# Patient Record
Sex: FEMALE | Race: WHITE | NOT HISPANIC OR LATINO | URBAN - METROPOLITAN AREA
[De-identification: names, ages, dates, MRNs, and addresses within clinical notes are randomized per-mention and may not be internally consistent; named-entity substitution may affect disease eponyms.]

---

## 2020-09-22 ENCOUNTER — EMERGENCY (EMERGENCY)
Facility: HOSPITAL | Age: 40
LOS: 0 days | Discharge: HOME | End: 2020-09-22
Attending: EMERGENCY MEDICINE | Admitting: EMERGENCY MEDICINE
Payer: MEDICAID

## 2020-09-22 VITALS
SYSTOLIC BLOOD PRESSURE: 112 MMHG | OXYGEN SATURATION: 98 % | TEMPERATURE: 99 F | HEART RATE: 84 BPM | RESPIRATION RATE: 18 BRPM | DIASTOLIC BLOOD PRESSURE: 70 MMHG

## 2020-09-22 DIAGNOSIS — M79.606 PAIN IN LEG, UNSPECIFIED: ICD-10-CM

## 2020-09-22 DIAGNOSIS — I83.812 VARICOSE VEINS OF LEFT LOWER EXTREMITY WITH PAIN: ICD-10-CM

## 2020-09-22 PROCEDURE — 99283 EMERGENCY DEPT VISIT LOW MDM: CPT

## 2020-09-22 NOTE — ED PROVIDER NOTE - PROVIDER TOKENS
PROVIDER:[TOKEN:[[id number]],FOLLOWUP:[1-3 Days]],PROVIDER:[TOKEN:[[id number]],FOLLOWUP:[1-3 Days]]

## 2020-09-22 NOTE — ED PROVIDER NOTE - OBJECTIVE STATEMENT
41 yo female, pmh of varicose veins, presents to ed for bleed from right lower leg varicose vein, happened after shower today, mild, used compression and wrap, no pain or radiation. denies numbness, tingling, trauma, limited rom.

## 2020-09-22 NOTE — ED PROVIDER NOTE - CARE PROVIDERS DIRECT ADDRESSES
[email or web address]

## 2020-09-22 NOTE — ED PROVIDER NOTE - ATTENDING CONTRIBUTION TO CARE
40F PMH methadone, smoker, obese, hypothyroid, varicose veins, p/w bleeding RLE varicose vein after taking a shower. denies shaving or scratching it. came to ED after she couldn't control bleeding. no cp, sob, palp, loc, dizziness. no numbness, weakness. no le pain. no fever, cough. bleeding stopped upon arrival to ED    on exam, AFVSS, well zeeshan nad, ncat, eomi, perrla, mmm, aaox3, no focal deficits, no le edema or calf ttp, R calf varicose vein  bleeding stopped,  no sign of infection, nontender,     a/p; Bleeding varicose vein, stopped on its own in ED, wound cleaned and dressing applied, f/u pmd/ vascular 1-2 weeks, strict return precautions provided.

## 2020-09-22 NOTE — ED PROVIDER NOTE - PATIENT PORTAL LINK FT
You can access the FollowMyHealth Patient Portal offered by [facility] by registering at the following website: [email or web address]. By joining [employer]’s FollowMyHealth portal, you will also be able to view your health information using other applications (apps) compatible with our system.

## 2020-09-22 NOTE — ED PROVIDER NOTE - CLINICAL SUMMARY MEDICAL DECISION MAKING FREE TEXT BOX
a/p; Bleeding varicose vein, stopped on its own in ED, wound cleaned and dressing applied, f/u pmd/ vascular 1-2 weeks, strict return precautions provided.

## 2020-09-22 NOTE — ED PROVIDER NOTE - CARE PROVIDER_API CALL
Sumanth Silverman  PLASTIC SURGERY  [address] [place] [address]  Phone: [phone]  Fax: [phone]  Follow Up Time: 1-3 Days    Alf Burciaga  Vascular Surgery  [address] [place] [id number]  Phone: [phone]  Fax: [phone]  Follow Up Time: 1-3 Days

## 2020-09-22 NOTE — ED PROVIDER NOTE - NS ED ROS FT
Constitutional: (-) weakness  Musculoskeletal: (-) joint pain,  Integumentary: (-) bleeding, (+) varicose vein   Neurological: (-) tingling, (-)numbness,  Allergic/Immunologic: (-) pruritus

## 2020-09-22 NOTE — ED PROVIDER NOTE - PHYSICAL EXAMINATION
Physical Exam    Vital Signs: I have reviewed the initial vital signs.  Constitutional: well-nourished, appears stated age, no acute distress  Eyes: Conjunctiva pink, Sclera clear,  Cardiovascular: S1 and S2, regular rate, regular rhythm, well-perfused extremities, radial pulses equal and 2+, pedal pulses 2+ and equal   Integumentary: varicose vein non bleeding to left distal tibia  Neurologic: awake, alert, nvi

## 2020-09-23 ENCOUNTER — EMERGENCY (EMERGENCY)
Facility: HOSPITAL | Age: 40
LOS: 0 days | Discharge: HOME | End: 2020-09-23
Attending: EMERGENCY MEDICINE | Admitting: EMERGENCY MEDICINE
Payer: MEDICAID

## 2020-09-23 VITALS
RESPIRATION RATE: 20 BRPM | SYSTOLIC BLOOD PRESSURE: 122 MMHG | OXYGEN SATURATION: 95 % | TEMPERATURE: 99 F | DIASTOLIC BLOOD PRESSURE: 66 MMHG | HEART RATE: 92 BPM | WEIGHT: 220.02 LBS

## 2020-09-23 DIAGNOSIS — F17.200 NICOTINE DEPENDENCE, UNSPECIFIED, UNCOMPLICATED: ICD-10-CM

## 2020-09-23 DIAGNOSIS — I83.891 VARICOSE VEINS OF RIGHT LOWER EXTREMITY WITH OTHER COMPLICATIONS: ICD-10-CM

## 2020-09-23 DIAGNOSIS — E03.9 HYPOTHYROIDISM, UNSPECIFIED: ICD-10-CM

## 2020-09-23 PROCEDURE — 99284 EMERGENCY DEPT VISIT MOD MDM: CPT

## 2020-09-23 NOTE — ED PROVIDER NOTE - CARE PROVIDER_API CALL
Alf Burciaga  Vascular Surgery  [address] [place] [address]  Phone: [phone]  Fax: [phone]  Follow Up Time:

## 2020-09-23 NOTE — ED PROVIDER NOTE - ATTENDING CONTRIBUTION TO CARE
41 y/o female with hx opioid dependence, on Methadone, anxiety, hypothyroidism presents to ED for bleeding varicosity to the RLE.  No leg pain, CP, SOB.  No fever or chills.  PE:  agree with above.  A/P:  Bleeding Varicosity.  Gelfoam and Reassess.

## 2020-09-23 NOTE — ED PROVIDER NOTE - OBJECTIVE STATEMENT
Pt is a 39y/o female with a pmhx of opioid abuse on methadone, anxiety, hypothyroidism presents today fore eval of bleeding varicose vein that began 1 hour PTA. Pt rachel serna yesterday for same complaint. Pt denies fever, chills, weakness, lightheadedness, n/v/d.

## 2020-09-23 NOTE — ED PROVIDER NOTE - NS ED ROS FT
ENMT:  No hearing changes, pain, discharge or infections. No neck pain or stiffness.  Cardiac:  No chest pain, SOB or edema  Respiratory:  No cough or respiratory distress. No hemoptysis. No history of asthma or RAD.  GI:  No nausea, vomiting, diarrhea or abdominal pain.  MS:  No myalgia, muscle weakness, joint pain or back pain.  Neuro:  No headache or weakness.  No LOC.  Skin:  + varicose veins  Endocrine: No history of thyroid disease or diabetes.  Except as documented in the HPI,  all other systems are negative.

## 2020-09-23 NOTE — ED PROVIDER NOTE - PATIENT PORTAL LINK FT
You can access the FollowMyHealth Patient Portal offered by [facility] by registering at the following website: [email or web address]. By joining [employer]’s FollowMyHealth portal, you will also be able to view your health information using other applications (apps) compatible with our system.

## 2020-09-23 NOTE — ED PROVIDER NOTE - PHYSICAL EXAMINATION
VITAL SIGNS: I have reviewed nursing notes and confirm.  CONSTITUTIONAL: Well-developed; well-nourished; in no acute distress.   SKIN:  varicose veins present, + scabbed over area of RLE varicose vein, remainder of skin exam is warm and dry, no acute rash.    HEAD: Normocephalic; atraumatic.  EYES: conjunctiva and sclera clear.  ENT: No nasal discharge; airway clear.  EXT: Normal ROM.  No clubbing, cyanosis or edema.   NEURO: Alert, oriented, grossly unremarkable

## 2020-09-23 NOTE — ED ADULT TRIAGE NOTE - CHIEF COMPLAINT QUOTE
pt presents to ED BIBA for varicose vein "popped." pt was seen in ED yesterday for same thing and it was clotted and was sent home. Denies any trauma to leg, happened while patient was walking. Bleeding controlled, pt pupils noted to be pinpoint.

## 2020-09-24 PROBLEM — Z00.00 ENCOUNTER FOR PREVENTIVE HEALTH EXAMINATION: Status: ACTIVE | Noted: 2020-09-24

## 2020-09-29 ENCOUNTER — APPOINTMENT (OUTPATIENT)
Dept: BURN CARE | Facility: CLINIC | Age: 40
End: 2020-09-29

## 2020-10-15 ENCOUNTER — APPOINTMENT (OUTPATIENT)
Dept: VASCULAR SURGERY | Facility: CLINIC | Age: 40
End: 2020-10-15

## 2020-10-21 ENCOUNTER — EMERGENCY (EMERGENCY)
Facility: HOSPITAL | Age: 40
LOS: 0 days | Discharge: HOME | End: 2020-10-21
Attending: EMERGENCY MEDICINE | Admitting: EMERGENCY MEDICINE
Payer: MEDICAID

## 2020-10-21 VITALS
RESPIRATION RATE: 18 BRPM | HEART RATE: 95 BPM | OXYGEN SATURATION: 97 % | DIASTOLIC BLOOD PRESSURE: 76 MMHG | SYSTOLIC BLOOD PRESSURE: 124 MMHG | TEMPERATURE: 100 F

## 2020-10-21 DIAGNOSIS — M76.61 ACHILLES TENDINITIS, RIGHT LEG: ICD-10-CM

## 2020-10-21 DIAGNOSIS — M79.671 PAIN IN RIGHT FOOT: ICD-10-CM

## 2020-10-21 DIAGNOSIS — F17.200 NICOTINE DEPENDENCE, UNSPECIFIED, UNCOMPLICATED: ICD-10-CM

## 2020-10-21 DIAGNOSIS — Z79.899 OTHER LONG TERM (CURRENT) DRUG THERAPY: ICD-10-CM

## 2020-10-21 DIAGNOSIS — E03.9 HYPOTHYROIDISM, UNSPECIFIED: ICD-10-CM

## 2020-10-21 PROCEDURE — 93970 EXTREMITY STUDY: CPT | Mod: 26

## 2020-10-21 PROCEDURE — 99284 EMERGENCY DEPT VISIT MOD MDM: CPT

## 2020-10-21 RX ORDER — KETOROLAC TROMETHAMINE 30 MG/ML
30 SYRINGE (ML) INJECTION ONCE
Refills: 0 | Status: DISCONTINUED | OUTPATIENT
Start: 2020-10-21 | End: 2020-10-21

## 2020-10-21 RX ADMIN — Medication 30 MILLIGRAM(S): at 19:56

## 2020-10-21 NOTE — ED PROVIDER NOTE - PROGRESS NOTE DETAILS
vasc prelim negative. patient educated on s/s to be aware of that should prompt return to the er. patient verbalizes understanding and will fu with pmd.

## 2020-10-21 NOTE — ED PROVIDER NOTE - PHYSICAL EXAMINATION
CONSTITUTIONAL: Well-developed; well-nourished; in no acute distress, nontoxic appearing  SKIN: skin exam is warm and dry,  HEAD: Normocephalic; atraumatic.  CARD: S1, S2 normal, no murmur  RESP: No wheezes, rales or rhonchi. Good air movement bilaterally  ABD: soft; non-distended; non-tender.   EXT: +TTP overlying achilles tendon, no deformity, FROM of extremity at hip, knee, ankle. pulses 2+. No calf tenderness/swelling  NEURO: awake, alert, following commands, oriented, grossly unremarkable. No Focal deficits. GCS 15.   PSYCH: Cooperative, appropriate.

## 2020-10-21 NOTE — ED PROVIDER NOTE - PATIENT PORTAL LINK FT
You can access the FollowMyHealth Patient Portal offered by [facility] by registering at the following website: [email or web address]. By joining [employer]’s FollowMyHealth portal, you will also be able to view your health information using other applications (apps) compatible with our system.

## 2020-10-21 NOTE — ED ADULT NURSE NOTE - ASSOCIATED SYMPTOMS
right leg swelling with pain. no trauma or deformity noted. pt. alert and oriented times four. vss. pt. went to duplex. pain meds given will monitor.

## 2020-10-21 NOTE — ED ADULT TRIAGE NOTE - CHIEF COMPLAINT QUOTE
pt c/o right leg swelling/redness x 1 week. pt with hx of cellulitis. denies any fever/chills denies any injury.

## 2020-10-21 NOTE — ED PROVIDER NOTE - NS ED ROS FT
Review of Systems:  	•	CONSTITUTIONAL: no fever, no diaphoresis, no chills  	•	SKIN: no rash  	•	HEMATOLOGIC: no bleeding, no bruising  	•	RESPIRATORY: no shortness of breath, no cough  	•	CARDIAC: no chest pain, no palpitations  	•	GI: no abd pain, no nausea, no vomiting, no diarrhea  	•	MUSCULOSKELETAL: +R ankle pain, +swelling, no redness  	•	NEUROLOGIC: no weakness, no paresthesias, no numbness

## 2020-10-21 NOTE — ED PROVIDER NOTE - OBJECTIVE STATEMENT
40 year old female, past medical history substance use disorder on methotrexate, hypothyroidism, who presents with R foot pain x5 days. 40 year old female, past medical history substance use disorder on methotrexate, hypothyroidism, who presents with R foot pain x5 days. Patient states she walked xseveral miles day after initial pain began. Denies recent fall/trauma. Patient reports pain worse with ambulation improved with rest. No weakness, numbness, paresthesias. No fever, chills, CP, SOB, skin changes.

## 2020-10-21 NOTE — ED PROVIDER NOTE - ATTENDING CONTRIBUTION TO CARE
39 y/o female with hx of substance use disorder presents to ED with right dorsal and posterior foot pain x 5 days, progressively worsening.   Exacerbated by ambulation and plantar flexion.  PE:  agree with above.  A/P:  Achilles Tendonitis.  Rest, NSAIDs and D/C home.

## 2020-10-22 ENCOUNTER — EMERGENCY (EMERGENCY)
Facility: HOSPITAL | Age: 40
LOS: 0 days | Discharge: LEFT BEFORE DISCHARGE | End: 2020-10-22
Attending: STUDENT IN AN ORGANIZED HEALTH CARE EDUCATION/TRAINING PROGRAM | Admitting: STUDENT IN AN ORGANIZED HEALTH CARE EDUCATION/TRAINING PROGRAM
Payer: MEDICAID

## 2020-10-22 VITALS
DIASTOLIC BLOOD PRESSURE: 72 MMHG | SYSTOLIC BLOOD PRESSURE: 117 MMHG | TEMPERATURE: 100 F | OXYGEN SATURATION: 95 % | HEART RATE: 89 BPM | RESPIRATION RATE: 20 BRPM

## 2020-10-22 DIAGNOSIS — E66.01 MORBID (SEVERE) OBESITY DUE TO EXCESS CALORIES: ICD-10-CM

## 2020-10-22 DIAGNOSIS — M79.669 PAIN IN UNSPECIFIED LOWER LEG: ICD-10-CM

## 2020-10-22 DIAGNOSIS — I83.891 VARICOSE VEINS OF RIGHT LOWER EXTREMITY WITH OTHER COMPLICATIONS: ICD-10-CM

## 2020-10-22 DIAGNOSIS — F17.200 NICOTINE DEPENDENCE, UNSPECIFIED, UNCOMPLICATED: ICD-10-CM

## 2020-10-22 DIAGNOSIS — E03.9 HYPOTHYROIDISM, UNSPECIFIED: ICD-10-CM

## 2020-10-22 PROCEDURE — 99283 EMERGENCY DEPT VISIT LOW MDM: CPT

## 2020-10-22 RX ORDER — KETOROLAC TROMETHAMINE 30 MG/ML
15 SYRINGE (ML) INJECTION ONCE
Refills: 0 | Status: DISCONTINUED | OUTPATIENT
Start: 2020-10-22 | End: 2020-10-22

## 2020-10-22 NOTE — ED PROVIDER NOTE - CARE PROVIDER_API CALL
Alf Burciaga  Vascular Surgery  [address] [place] [id number]  Phone: [phone]  Fax: [phone]  Follow Up Time: 1-3 Days

## 2020-10-22 NOTE — ED PROVIDER NOTE - CONSTITUTIONAL NEGATIVE STATEMENT, MLM
[facility] called they need medical records to why he can't be seen in network. Delma stated that there are many providers in network and there would need to be medical necessity for him to have benefits out of network. I asked if she wanted to talk to medical records for specific records and she said no and that it is typically customary that the provider sends medical records with the referral to begin with. I let her know that I would put in a message for Babs for medical records.    no fever and no chills.

## 2020-10-22 NOTE — ED PROVIDER NOTE - ATTENDING CONTRIBUTION TO CARE
40 year old female, past medical history substance use disorder on methotrexate, hypothyroidism, morbid obesity currently who presents to [facility] stating that one of her varicose veins bled pta. Bleeding controlled with pressure. She was just seen yesterday in the ED for R foot pain x5 days after walking several miles. Duplex US negative for DVT and pt d/c on crutches with Dx of tendonitis. She states that she has an orthopedist through her pcp who saw her yesterday and also told her she had tendonitis. She denies focal weakness or numbness, rashes, trauma, falls.     Gen - NAD, Head - NCAT, TMs - clear b/l, Pharynx - clear, MMM, Heart - RRR, no m/g/r, Lungs - CTAB, no w/c/r, Abdomen - soft, NT, ND, Skin - No rash, Extremities - No bleeding, palpable hematoma, FROM, no edema, erythema, ecchymosis, Neuro - CN 2-12 intact, nl strength and sensation, nl gait.    P: Local wound care provided, no arterial bleed, pt ambulatory on crutches, and will give rehab f/u.

## 2020-10-22 NOTE — ED PROVIDER NOTE - PATIENT PORTAL LINK FT
You can access the FollowMyHealth Patient Portal offered by [facility] by registering at the following website: [email or web address]. By joining [employer]’s FollowMyHealth portal, you will also be able to view your health information using other applications (apps) compatible with our system.

## 2020-10-22 NOTE — ED ADULT NURSE NOTE - OBJECTIVE STATEMENT
Patient present to ED with complains of right lower extremity bleeding varicose vein at 0230pm, bleeding controlled PTA. Denies use of AC and fall to site.

## 2020-10-22 NOTE — ED PROVIDER NOTE - CLINICAL SUMMARY MEDICAL DECISION MAKING FREE TEXT BOX
40 year old female, past medical history substance use disorder on methotrexate, hypothyroidism, morbid obesity currently who presents to [facility] stating that one of her varicose veins bled pta. Bleeding controlled with pressure. She was just seen yesterday in the ED for R foot pain x5 days after walking several miles. Duplex US negative for DVT and pt d/c on crutches with Dx of tendonitis. She states that she has an orthopedist through her pcp who saw her yesterday and also told her she had tendonitis. She denies focal weakness or numbness, rashes, trauma, falls. Local wound care provided, pt given rehab for f/u. I have fully discussed the medical management and delivery of care with the patient. I have discussed any available labs, imaging and treatment options with the patient. All Questions answered at the bedside. Patient confirms understanding and has been given detailed return precautions. Patient instructed to return to the ED should symptoms persist or worsen. Patient has demonstrated capacity and has verbalized understanding. Patient is well appearing upon discharge, ambulatory with a steady gait.

## 2020-10-22 NOTE — ED PROVIDER NOTE - OBJECTIVE STATEMENT
40 y.o. female with a PMH of hypothyroidism and opiate dependence on MMTP presented to the ER c/o bleeding varicosity of Right lower leg at 2:30 PM today.  Pt states she was standing in shower when it happened.  No direct trauma.  Bleeding stopped.

## 2020-10-27 ENCOUNTER — APPOINTMENT (OUTPATIENT)
Dept: VASCULAR SURGERY | Facility: CLINIC | Age: 40
End: 2020-10-27

## 2022-06-29 NOTE — ED PROVIDER NOTE - PSYCHIATRIC, MLM
Alert and oriented to person, place, time/situation. normal mood and affect. no apparent risk to self or others.
endoscopy

## 2022-10-12 ENCOUNTER — INPATIENT (INPATIENT)
Facility: HOSPITAL | Age: 42
LOS: 6 days | Discharge: HOME | End: 2022-10-19
Attending: HOSPITALIST | Admitting: HOSPITALIST

## 2022-10-12 VITALS
HEIGHT: 64 IN | TEMPERATURE: 98 F | RESPIRATION RATE: 18 BRPM | WEIGHT: 190.04 LBS | SYSTOLIC BLOOD PRESSURE: 114 MMHG | DIASTOLIC BLOOD PRESSURE: 82 MMHG | OXYGEN SATURATION: 99 % | HEART RATE: 118 BPM

## 2022-10-12 PROCEDURE — 99285 EMERGENCY DEPT VISIT HI MDM: CPT

## 2022-10-12 NOTE — ED ADULT TRIAGE NOTE - CHIEF COMPLAINT QUOTE
Patient complaining of R rib pain radiating to back x2weeks. Patient also states rapid weight loss over 1year.

## 2022-10-13 LAB
ALBUMIN SERPL ELPH-MCNC: 4.4 G/DL — SIGNIFICANT CHANGE UP (ref 3.5–5.2)
ALP SERPL-CCNC: 116 U/L — HIGH (ref 30–115)
ALT FLD-CCNC: 23 U/L — SIGNIFICANT CHANGE UP (ref 0–41)
ANION GAP SERPL CALC-SCNC: 10 MMOL/L — SIGNIFICANT CHANGE UP (ref 7–14)
APPEARANCE UR: CLEAR — SIGNIFICANT CHANGE UP
AST SERPL-CCNC: 25 U/L — SIGNIFICANT CHANGE UP (ref 0–41)
BACTERIA # UR AUTO: ABNORMAL
BASE EXCESS BLDV CALC-SCNC: 6.8 MMOL/L — HIGH (ref -2–3)
BASOPHILS # BLD AUTO: 0.06 K/UL — SIGNIFICANT CHANGE UP (ref 0–0.2)
BASOPHILS NFR BLD AUTO: 0.6 % — SIGNIFICANT CHANGE UP (ref 0–1)
BILIRUB DIRECT SERPL-MCNC: <0.2 MG/DL — SIGNIFICANT CHANGE UP (ref 0–0.3)
BILIRUB INDIRECT FLD-MCNC: >0.1 MG/DL — LOW (ref 0.2–1.2)
BILIRUB SERPL-MCNC: 0.3 MG/DL — SIGNIFICANT CHANGE UP (ref 0.2–1.2)
BILIRUB UR-MCNC: NEGATIVE — SIGNIFICANT CHANGE UP
BUN SERPL-MCNC: 19 MG/DL — SIGNIFICANT CHANGE UP (ref 10–20)
CA-I SERPL-SCNC: 1.25 MMOL/L — SIGNIFICANT CHANGE UP (ref 1.15–1.33)
CALCIUM SERPL-MCNC: 9.9 MG/DL — SIGNIFICANT CHANGE UP (ref 8.4–10.4)
CHLORIDE SERPL-SCNC: 95 MMOL/L — LOW (ref 98–110)
CO2 SERPL-SCNC: 30 MMOL/L — SIGNIFICANT CHANGE UP (ref 17–32)
COLOR SPEC: YELLOW — SIGNIFICANT CHANGE UP
CREAT SERPL-MCNC: 1 MG/DL — SIGNIFICANT CHANGE UP (ref 0.7–1.5)
D DIMER BLD IA.RAPID-MCNC: 407 NG/ML DDU — HIGH (ref 0–230)
DIFF PNL FLD: ABNORMAL
EGFR: 73 ML/MIN/1.73M2 — SIGNIFICANT CHANGE UP
EOSINOPHIL # BLD AUTO: 0.26 K/UL — SIGNIFICANT CHANGE UP (ref 0–0.7)
EOSINOPHIL NFR BLD AUTO: 2.7 % — SIGNIFICANT CHANGE UP (ref 0–8)
EPI CELLS # UR: 13 /HPF — HIGH (ref 0–5)
GAS PNL BLDV: 135 MMOL/L — LOW (ref 136–145)
GAS PNL BLDV: SIGNIFICANT CHANGE UP
GLUCOSE SERPL-MCNC: 84 MG/DL — SIGNIFICANT CHANGE UP (ref 70–99)
GLUCOSE UR QL: NEGATIVE — SIGNIFICANT CHANGE UP
HCG SERPL QL: NEGATIVE — SIGNIFICANT CHANGE UP
HCO3 BLDV-SCNC: 33 MMOL/L — HIGH (ref 22–29)
HCT VFR BLD CALC: 41 % — SIGNIFICANT CHANGE UP (ref 37–47)
HCT VFR BLDA CALC: 41 % — SIGNIFICANT CHANGE UP (ref 39–51)
HGB BLD CALC-MCNC: 13.8 G/DL — SIGNIFICANT CHANGE UP (ref 12.6–17.4)
HGB BLD-MCNC: 13.2 G/DL — SIGNIFICANT CHANGE UP (ref 12–16)
HYALINE CASTS # UR AUTO: 7 /LPF — SIGNIFICANT CHANGE UP (ref 0–7)
IMM GRANULOCYTES NFR BLD AUTO: 0.2 % — SIGNIFICANT CHANGE UP (ref 0.1–0.3)
KETONES UR-MCNC: NEGATIVE — SIGNIFICANT CHANGE UP
LACTATE BLDV-MCNC: 1.2 MMOL/L — SIGNIFICANT CHANGE UP (ref 0.5–2)
LACTATE SERPL-SCNC: 1.1 MMOL/L — SIGNIFICANT CHANGE UP (ref 0.7–2)
LEUKOCYTE ESTERASE UR-ACNC: ABNORMAL
LIDOCAIN IGE QN: 14 U/L — SIGNIFICANT CHANGE UP (ref 7–60)
LYMPHOCYTES # BLD AUTO: 4.16 K/UL — HIGH (ref 1.2–3.4)
LYMPHOCYTES # BLD AUTO: 43.5 % — SIGNIFICANT CHANGE UP (ref 20.5–51.1)
MCHC RBC-ENTMCNC: 25.9 PG — LOW (ref 27–31)
MCHC RBC-ENTMCNC: 32.2 G/DL — SIGNIFICANT CHANGE UP (ref 32–37)
MCV RBC AUTO: 80.4 FL — LOW (ref 81–99)
MONOCYTES # BLD AUTO: 0.95 K/UL — HIGH (ref 0.1–0.6)
MONOCYTES NFR BLD AUTO: 9.9 % — HIGH (ref 1.7–9.3)
NEUTROPHILS # BLD AUTO: 4.11 K/UL — SIGNIFICANT CHANGE UP (ref 1.4–6.5)
NEUTROPHILS NFR BLD AUTO: 43.1 % — SIGNIFICANT CHANGE UP (ref 42.2–75.2)
NITRITE UR-MCNC: NEGATIVE — SIGNIFICANT CHANGE UP
NRBC # BLD: 0 /100 WBCS — SIGNIFICANT CHANGE UP (ref 0–0)
PCO2 BLDV: 52 MMHG — HIGH (ref 39–42)
PH BLDV: 7.41 — SIGNIFICANT CHANGE UP (ref 7.32–7.43)
PH UR: 6 — SIGNIFICANT CHANGE UP (ref 5–8)
PLATELET # BLD AUTO: 429 K/UL — HIGH (ref 130–400)
PO2 BLDV: 29 MMHG — SIGNIFICANT CHANGE UP
POTASSIUM BLDV-SCNC: 3.7 MMOL/L — SIGNIFICANT CHANGE UP (ref 3.5–5.1)
POTASSIUM SERPL-MCNC: 3.9 MMOL/L — SIGNIFICANT CHANGE UP (ref 3.5–5)
POTASSIUM SERPL-SCNC: 3.9 MMOL/L — SIGNIFICANT CHANGE UP (ref 3.5–5)
PROT SERPL-MCNC: 8.7 G/DL — HIGH (ref 6–8)
PROT UR-MCNC: SIGNIFICANT CHANGE UP
RBC # BLD: 5.1 M/UL — SIGNIFICANT CHANGE UP (ref 4.2–5.4)
RBC # FLD: 13.8 % — SIGNIFICANT CHANGE UP (ref 11.5–14.5)
RBC CASTS # UR COMP ASSIST: 4 /HPF — SIGNIFICANT CHANGE UP (ref 0–4)
SAO2 % BLDV: 52.2 % — SIGNIFICANT CHANGE UP
SODIUM SERPL-SCNC: 135 MMOL/L — SIGNIFICANT CHANGE UP (ref 135–146)
SP GR SPEC: 1.02 — SIGNIFICANT CHANGE UP (ref 1.01–1.03)
TROPONIN T SERPL-MCNC: <0.01 NG/ML — SIGNIFICANT CHANGE UP
UROBILINOGEN FLD QL: SIGNIFICANT CHANGE UP
WBC # BLD: 9.56 K/UL — SIGNIFICANT CHANGE UP (ref 4.8–10.8)
WBC # FLD AUTO: 9.56 K/UL — SIGNIFICANT CHANGE UP (ref 4.8–10.8)
WBC UR QL: 15 /HPF — HIGH (ref 0–5)

## 2022-10-13 PROCEDURE — 99406 BEHAV CHNG SMOKING 3-10 MIN: CPT

## 2022-10-13 PROCEDURE — 71275 CT ANGIOGRAPHY CHEST: CPT | Mod: 26,MA

## 2022-10-13 PROCEDURE — 74177 CT ABD & PELVIS W/CONTRAST: CPT | Mod: 26,MA

## 2022-10-13 PROCEDURE — 71045 X-RAY EXAM CHEST 1 VIEW: CPT | Mod: 26

## 2022-10-13 PROCEDURE — 93010 ELECTROCARDIOGRAM REPORT: CPT

## 2022-10-13 PROCEDURE — 76705 ECHO EXAM OF ABDOMEN: CPT | Mod: 26

## 2022-10-13 PROCEDURE — 99223 1ST HOSP IP/OBS HIGH 75: CPT | Mod: 25

## 2022-10-13 RX ORDER — METHADONE HYDROCHLORIDE 40 MG/1
190 TABLET ORAL ONCE
Refills: 0 | Status: DISCONTINUED | OUTPATIENT
Start: 2022-10-13 | End: 2022-10-13

## 2022-10-13 RX ORDER — MORPHINE SULFATE 50 MG/1
4 CAPSULE, EXTENDED RELEASE ORAL ONCE
Refills: 0 | Status: DISCONTINUED | OUTPATIENT
Start: 2022-10-13 | End: 2022-10-13

## 2022-10-13 RX ORDER — FAMOTIDINE 10 MG/ML
20 INJECTION INTRAVENOUS ONCE
Refills: 0 | Status: COMPLETED | OUTPATIENT
Start: 2022-10-13 | End: 2022-10-13

## 2022-10-13 RX ORDER — PANTOPRAZOLE SODIUM 20 MG/1
40 TABLET, DELAYED RELEASE ORAL
Refills: 0 | Status: DISCONTINUED | OUTPATIENT
Start: 2022-10-13 | End: 2022-10-19

## 2022-10-13 RX ORDER — SODIUM CHLORIDE 9 MG/ML
1000 INJECTION, SOLUTION INTRAVENOUS
Refills: 0 | Status: DISCONTINUED | OUTPATIENT
Start: 2022-10-13 | End: 2022-10-14

## 2022-10-13 RX ORDER — SODIUM CHLORIDE 9 MG/ML
1000 INJECTION, SOLUTION INTRAVENOUS ONCE
Refills: 0 | Status: COMPLETED | OUTPATIENT
Start: 2022-10-13 | End: 2022-10-13

## 2022-10-13 RX ORDER — VANCOMYCIN HCL 1 G
1000 VIAL (EA) INTRAVENOUS ONCE
Refills: 0 | Status: DISCONTINUED | OUTPATIENT
Start: 2022-10-13 | End: 2022-10-13

## 2022-10-13 RX ADMIN — SODIUM CHLORIDE 75 MILLILITER(S): 9 INJECTION, SOLUTION INTRAVENOUS at 14:54

## 2022-10-13 RX ADMIN — MORPHINE SULFATE 4 MILLIGRAM(S): 50 CAPSULE, EXTENDED RELEASE ORAL at 12:30

## 2022-10-13 RX ADMIN — SODIUM CHLORIDE 75 MILLILITER(S): 9 INJECTION, SOLUTION INTRAVENOUS at 23:14

## 2022-10-13 RX ADMIN — SODIUM CHLORIDE 1000 MILLILITER(S): 9 INJECTION, SOLUTION INTRAVENOUS at 02:50

## 2022-10-13 RX ADMIN — METHADONE HYDROCHLORIDE 190 MILLIGRAM(S): 40 TABLET ORAL at 16:44

## 2022-10-13 RX ADMIN — MORPHINE SULFATE 4 MILLIGRAM(S): 50 CAPSULE, EXTENDED RELEASE ORAL at 02:51

## 2022-10-13 RX ADMIN — FAMOTIDINE 20 MILLIGRAM(S): 10 INJECTION INTRAVENOUS at 02:51

## 2022-10-13 RX ADMIN — MORPHINE SULFATE 4 MILLIGRAM(S): 50 CAPSULE, EXTENDED RELEASE ORAL at 12:50

## 2022-10-13 NOTE — ED PROVIDER NOTE - NS ED ROS FT
Review of Systems:  	•	CONSTITUTIONAL: no fever   	•	SKIN: no rash   	•	ENT: no sore throat   	•	RESPIRATORY: no shortness of breath, no cough  	•	CARDIAC: no chest pain, no palpitations  	•	GI: +abd pain, +nausea, no vomiting, no diarrhea    	•	MUSCULOSKELETAL: no joint paint, no swelling, no redness  	•	NEUROLOGIC: no weakness, no headache   	•	PSYCH: no anxiety, non suicidal, non homicidal, no hallucination, no depression

## 2022-10-13 NOTE — H&P ADULT - NSHPPHYSICALEXAM_GEN_ALL_CORE
PHYSICAL EXAM:  GENERAL: NAD, speaks in full sentences, no signs of respiratory distress, cushingoid appearance  HEAD:  Atraumatic, Normocephalic  CHEST/LUNG: Clear to auscultation bilaterally; No wheeze; No crackles; No accessory muscles used  HEART: Regular rate and rhythm; No murmurs;   ABDOMEN: Soft, Nontender, Nondistended; Bowel sounds present; No guarding  EXTREMITIES:  2+ Peripheral Pulses, No cyanosis or edema  PSYCH: AAOx3  NEUROLOGY: non-focal  SKIN: No rashes or lesions

## 2022-10-13 NOTE — H&P ADULT - ASSESSMENT
41 y/o F pt w/ PMH/o HTN, HLD, GERD, Opioid abuse, active smoker, hypothyroidism presents to the ED w/ RT sided epigastric pain radiating across to her mid-back for the past several days      #Inferior T7 and superior T8 endplate erosive changes  - CT Abdomen/pelvis noncon: Inferior T7 and superior T8 endplate erosive changes with adjacent soft tissue prominence suspicious for an infectious discitis/osteomyelitis osteomyelitis.  - TTP to thoracic spine on exam, although abdominal pain inconsistent w/ findings  - WC 9, Afebrile  - ID, Ortho consult  - Pain Control    #Epigastric Pain  #Hx/o GERD  - Pt reports she never underwent EGD, was not officially diagnoses w/ peptic ulcer  - No significant intra-abdominal findings on CT scans or RUQ US  - PPI  - F/u lipase  - Unclear if pain exacerbated w/ food on interview, NPO for now, advance diet as tolerated  - IV fluid    #Cushingoid appearance  - Would recommend low dose dexamethasone suppression test    #Chronic Opioid use on Methadone therapy  - Reports she follows w/ [facility]  - 190mg PO daily as per pt (Clinic contacted, message left)    #Hx/o HTN  #HLD  - Controlled off antihypertensives  - F/u lipid panel, Check A1c    #Hypothyroidism  - Not on levothyroxine  - F/u TSH    #GI PPx-PPI  #Diet- NPO  #CHG  #Activity- IAT  #Dispo- Acute  #Code- Full       41 y/o F pt w/ PMH/o HTN, HLD, GERD, Opioid abuse, active smoker, hypothyroidism presents to the ED w/ RT sided epigastric pain radiating across to her mid-back for the past several days      #Inferior T7 and superior T8 endplate erosive changes  - CT Abdomen/pelvis noncon: Inferior T7 and superior T8 endplate erosive changes with adjacent soft tissue prominence suspicious for an infectious discitis/osteomyelitis osteomyelitis.  - TTP to thoracic spine on exam, although abdominal pain inconsistent w/ findings  - WC 9, Afebrile  - ID, Ortho consult  - Pain Control    #Epigastric Pain  #Hx/o GERD  - Pt reports she never underwent EGD, was not officially diagnoses w/ peptic ulcer  - No significant intra-abdominal findings on CT scans or RUQ US  - PPI  - F/u lipase  - Unclear if pain exacerbated w/ food on interview, NPO for now, advance diet as tolerated  - IV fluid    #Cushingoid appearance  - Would recommend low dose dexamethasone suppression test    #Chronic Opioid use on Methadone therapy  - Reports she follows w/ [facility]  - 190mg PO daily as per pt (Clinic contacted, message left)    #Hx/o HTN  #HLD  - Controlled off antihypertensives  - F/u lipid panel, Check A1c    #Hypothyroidism  - Not on levothyroxine  - F/u TSH    #Anxiety  - On PO alprazolam at home  - PO lorazepam prn    #GI PPx-PPI  #Diet- NPO  #CHG  #Activity- IAT  #Dispo- Acute  #Code- Full       43 y/o F pt w/ PMH/o HTN, HLD, GERD, Opioid abuse, active smoker, hypothyroidism presents to the ED w/ RT sided epigastric pain radiating across to her mid-back for the past several days      #Inferior T7 and superior T8 endplate erosive changes  - CT Abdomen/pelvis noncon: Inferior T7 and superior T8 endplate erosive changes with adjacent soft tissue prominence suspicious for an infectious discitis/osteomyelitis osteomyelitis.  - TTP to thoracic spine on exam, although abdominal pain inconsistent w/ findings  - WC 9, Afebrile  - ID, Ortho consult  - Pain Control    #Epigastric Pain  #Hx/o GERD  - Pt reports she never underwent EGD, was not officially diagnoses w/ peptic ulcer  - No significant intra-abdominal findings on CT scans or RUQ US  - Lipase 14  - PPI  - Unclear if pain exacerbated w/ food on interview, NPO for now, advance diet as tolerated  - IV fluid    #Cushingoid appearance  - Would recommend low dose dexamethasone suppression test    #Chronic Opioid use on Methadone therapy  - Reports she follows w/ [facility]  - 190mg PO daily as per pt (Clinic contacted, message left)    #Hx/o HTN  #HLD  - Controlled off antihypertensives  - F/u lipid panel, Check A1c    #Hypothyroidism  - Not on levothyroxine  - F/u TSH    #Anxiety  - On PO alprazolam at home  - PO lorazepam prn    #GI PPx-PPI  #Diet- NPO  #CHG  #Activity- IAT  #Dispo- Acute  #Code- Full

## 2022-10-13 NOTE — H&P ADULT - ATTENDING COMMENTS
41 YO F w/ a PMH of HTN, HLD, GERD, Opioid abuse, and hypothyroidism who presents to the hospital w/ a c/o right-sided epigastric pain. Described as sharp, radiating to back, and wax/wanes. Denies any fevers/chills, bowel/bladder incontinence, or LE weakness. ROS is negative except as above.    In the ED, CTA-CAP showed T7/8 endplate erosive chjanges concerning for OM/Discitis. Pt started on pain meds in the ED.     FMHx: Reviewed, not relevant    Physical exam shows pt in NAD. VSS, afebrile, not hypoxic on RA. A&Ox3. Neuro exam without deficits, motor/sensory intact, no dysarthria, no facial asymmetry. Muscle strength/sensation intact. CTA B/L with no W/C/R. RRR, no M/G/R. ABD is soft and non-tender, normoactive BSs. LEs without swelling. No rashes. Labs and radiology as above.     Epigastric/back pain likely due to T7/8 erosive endplate changes, suspect OM/Discitis; no sepsis present on admission. ID/Neurosurgery consult. ESR/CRP. Hold ABXs until cultured. PRN pain meds. PT. Fall precautions.     Prolonged QTc, methadone adverse effect. TSH. Monitor Ca, K, and Mg levels. Replace/Treat PRN. Serial EKGs. Hold QT prolonging agents.     Tobacco abuse with counseling. Pt extensively counseled on the benefits of smoking cessation and alternative therapies. Counseled for 15-20 minutes. Pt would like to think about their options prior to making a decision.     HX of HTN, HLD, GERD, Opioid abuse, and hypothyroidism. Restart home meds, except as stated above. DVT PPX. Inform PCP of pt's admission to hospital. My note supersedes the residents note.     Date seen by Attending: 10/13/22

## 2022-10-13 NOTE — ED PROVIDER NOTE - ATTENDING APP SHARED VISIT CONTRIBUTION OF CARE
Patient is c/o Rt sided abdominal pain, RUQ/RT flank area, denies f/c/cp/sob. Denies trauma.   Vitals reviewed.   Patient is awake, alert, answering questions appropriately, appears comfortable and not in any distress.  Lungs: CTA, no wheezing, no crackles.  Abd: +BS, +RUQ tenderness, ND, soft, no rebound, no guarding.   CNS: awake, alert, o x 3, no focal neurologic deficits.  A/P: Rt sided abdominal pain,   labs, imaging, reevaluation.

## 2022-10-13 NOTE — H&P ADULT - NSHPLABSRESULTS_GEN_ALL_CORE
13.2   9.56  )-----------( 429      ( 13 Oct 2022 02:29 )             41.0       10-13    135  |  95<L>  |  19  ----------------------------<  84  3.9   |  30  |  1.0    Ca    9.9      13 Oct 2022 02:29    TPro  8.7<H>  /  Alb  4.4  /  TBili  0.3  /  DBili  <0.2  /  AST  25  /  ALT  23  /  AlkPhos  116<H>  10-13              Urinalysis Basic - ( 13 Oct 2022 01:20 )    Color: Yellow / Appearance: Clear / S.023 / pH: x  Gluc: x / Ketone: Negative  / Bili: Negative / Urobili: <2 mg/dL   Blood: x / Protein: Trace / Nitrite: Negative   Leuk Esterase: Moderate / RBC: 4 /HPF / WBC 15 /HPF   Sq Epi: x / Non Sq Epi: 13 /HPF / Bacteria: Few            Lactate Trend  10-13 @ 02:29 Lactate:1.1       CARDIAC MARKERS ( 13 Oct 2022 02:29 )  x     / <0.01 ng/mL / x     / x     / x            CAPILLARY BLOOD GLUCOSE

## 2022-10-13 NOTE — ED PROVIDER NOTE - CLINICAL SUMMARY MEDICAL DECISION MAKING FREE TEXT BOX
pt was a signout from Dr. Loyd. 42 yr old f that presents with rib pain. labs, imaging obtained. pt found to have osteo on imaging. admit to medicine for further evaluation.

## 2022-10-13 NOTE — ED PROVIDER NOTE - NS ED ATTENDING STATEMENT MOD
This was a shared visit with the DULCE MARIA. I reviewed and verified the documentation and independently performed the documented:

## 2022-10-13 NOTE — ED PROVIDER NOTE - PHYSICAL EXAMINATION
CONSTITUTIONAL: Well-developed; well-nourished; in no acute distress, nontoxic appearing  SKIN: skin exam is warm and dry  ENT: MMM   CARD: S1, S2 normal, no murmur  RESP: No wheezes, rales or rhonchi. Good air movement bilaterally  ABD: soft; non-distended, +RUQ TTP, no rebound/guarding   EXT: Normal ROM. +TTP overlying b/l thoracic regions, no midline tenderness, no skin changes   NEURO: awake, alert, following commands, oriented, grossly unremarkable. No Focal deficits. GCS 15.   PSYCH: Cooperative, appropriate.

## 2022-10-13 NOTE — H&P ADULT - HISTORY OF PRESENT ILLNESS
43 y/o F pt w/ PMH/o HTN, HLD, GERD, Opioid abuse, active smoker, hypothyroidism presents to the ED w/ RT sided epigastric pain radiating across to her mid-back for the past several days. Pt reports the pain is sharp, intermittent, moderate. Pt reports the pain persists and denies eliciting factors (eating, physical exertion). Pt denies f/c/b, n/v, trauma. Pt reports no hx/o similar pain in the past.  In the ED,  · BP Systolic	114 mm Hg · BP Diastolic	82 mm Hg · Heart Rate	  118 /min · Respiration Rate (breaths/min)	18 /min · Temp (F)	97.9 Degrees F · Temp (C)	36.6 Degrees C · Temp site	oral · SpO2 (%)	99 % CT abdomen/pelvis: Inferior T7 and superior T8 endplate erosive changes with adjacent soft tissue prominence suspicious for an infectious discitis/osteomyelitis osteomyelitis.T10 superior endplate Schmorl's node. This has a benign appearance 41 y/o F pt w/ PMH/o HTN, HLD, GERD, Opioid abuse, active smoker, hypothyroidism presents to the ED w/ RT sided epigastric pain radiating across to her mid-back for the past several days. Pt reports the pain is sharp, intermittent, moderate. Pt reports the pain persists and denies eliciting factors (eating, physical exertion). Pt denies f/c/b, n/v, trauma. Pt reports no hx/o similar pain in the past.  In the ED,  · BP Systolic	114 mm Hg · BP Diastolic	82 mm Hg · Heart Rate	  118 /min · Respiration Rate (breaths/min)	18 /min · Temp (F)	97.9 Degrees F · Temp (C)	36.6 Degrees C · Temp site	oral · SpO2 (%)	99 % CT abdomen/pelvis: Inferior T7 and superior T8 endplate erosive changes with adjacent soft tissue prominence suspicious for an infectious discitis/osteomyelitis osteomyelitis.T10 superior endplate Schmorl's node. This has a benign appearance

## 2022-10-14 LAB
A1C WITH ESTIMATED AVERAGE GLUCOSE RESULT: 5.3 % — SIGNIFICANT CHANGE UP (ref 4–5.6)
ALBUMIN SERPL ELPH-MCNC: 3.5 G/DL — SIGNIFICANT CHANGE UP (ref 3.5–5.2)
ALP SERPL-CCNC: 91 U/L — SIGNIFICANT CHANGE UP (ref 30–115)
ALT FLD-CCNC: 19 U/L — SIGNIFICANT CHANGE UP (ref 0–41)
ANION GAP SERPL CALC-SCNC: 11 MMOL/L — SIGNIFICANT CHANGE UP (ref 7–14)
AST SERPL-CCNC: 18 U/L — SIGNIFICANT CHANGE UP (ref 0–41)
BASOPHILS # BLD AUTO: 0.04 K/UL — SIGNIFICANT CHANGE UP (ref 0–0.2)
BASOPHILS NFR BLD AUTO: 0.6 % — SIGNIFICANT CHANGE UP (ref 0–1)
BILIRUB SERPL-MCNC: 0.3 MG/DL — SIGNIFICANT CHANGE UP (ref 0.2–1.2)
BUN SERPL-MCNC: 17 MG/DL — SIGNIFICANT CHANGE UP (ref 10–20)
CALCIUM SERPL-MCNC: 8.8 MG/DL — SIGNIFICANT CHANGE UP (ref 8.4–10.4)
CHLORIDE SERPL-SCNC: 98 MMOL/L — SIGNIFICANT CHANGE UP (ref 98–110)
CHOLEST SERPL-MCNC: 163 MG/DL — SIGNIFICANT CHANGE UP
CO2 SERPL-SCNC: 26 MMOL/L — SIGNIFICANT CHANGE UP (ref 17–32)
CREAT SERPL-MCNC: 0.9 MG/DL — SIGNIFICANT CHANGE UP (ref 0.7–1.5)
CULTURE RESULTS: SIGNIFICANT CHANGE UP
EGFR: 82 ML/MIN/1.73M2 — SIGNIFICANT CHANGE UP
EOSINOPHIL # BLD AUTO: 0.4 K/UL — SIGNIFICANT CHANGE UP (ref 0–0.7)
EOSINOPHIL NFR BLD AUTO: 6.4 % — SIGNIFICANT CHANGE UP (ref 0–8)
ESTIMATED AVERAGE GLUCOSE: 105 MG/DL — SIGNIFICANT CHANGE UP (ref 68–114)
GLUCOSE SERPL-MCNC: 71 MG/DL — SIGNIFICANT CHANGE UP (ref 70–99)
HCT VFR BLD CALC: 37.1 % — SIGNIFICANT CHANGE UP (ref 37–47)
HDLC SERPL-MCNC: 42 MG/DL — LOW
HGB BLD-MCNC: 11.6 G/DL — LOW (ref 12–16)
IMM GRANULOCYTES NFR BLD AUTO: 0.3 % — SIGNIFICANT CHANGE UP (ref 0.1–0.3)
LIPID PNL WITH DIRECT LDL SERPL: 94 MG/DL — SIGNIFICANT CHANGE UP
LYMPHOCYTES # BLD AUTO: 2.87 K/UL — SIGNIFICANT CHANGE UP (ref 1.2–3.4)
LYMPHOCYTES # BLD AUTO: 46.1 % — SIGNIFICANT CHANGE UP (ref 20.5–51.1)
MAGNESIUM SERPL-MCNC: 2 MG/DL — SIGNIFICANT CHANGE UP (ref 1.8–2.4)
MCHC RBC-ENTMCNC: 25.9 PG — LOW (ref 27–31)
MCHC RBC-ENTMCNC: 31.3 G/DL — LOW (ref 32–37)
MCV RBC AUTO: 82.8 FL — SIGNIFICANT CHANGE UP (ref 81–99)
MONOCYTES # BLD AUTO: 0.75 K/UL — HIGH (ref 0.1–0.6)
MONOCYTES NFR BLD AUTO: 12 % — HIGH (ref 1.7–9.3)
NEUTROPHILS # BLD AUTO: 2.15 K/UL — SIGNIFICANT CHANGE UP (ref 1.4–6.5)
NEUTROPHILS NFR BLD AUTO: 34.6 % — LOW (ref 42.2–75.2)
NON HDL CHOLESTEROL: 121 MG/DL — SIGNIFICANT CHANGE UP
NRBC # BLD: 0 /100 WBCS — SIGNIFICANT CHANGE UP (ref 0–0)
PLATELET # BLD AUTO: 333 K/UL — SIGNIFICANT CHANGE UP (ref 130–400)
POTASSIUM SERPL-MCNC: 4.4 MMOL/L — SIGNIFICANT CHANGE UP (ref 3.5–5)
POTASSIUM SERPL-SCNC: 4.4 MMOL/L — SIGNIFICANT CHANGE UP (ref 3.5–5)
PROT SERPL-MCNC: 7 G/DL — SIGNIFICANT CHANGE UP (ref 6–8)
RBC # BLD: 4.48 M/UL — SIGNIFICANT CHANGE UP (ref 4.2–5.4)
RBC # FLD: 14.2 % — SIGNIFICANT CHANGE UP (ref 11.5–14.5)
SARS-COV-2 RNA SPEC QL NAA+PROBE: SIGNIFICANT CHANGE UP
SODIUM SERPL-SCNC: 135 MMOL/L — SIGNIFICANT CHANGE UP (ref 135–146)
SPECIMEN SOURCE: SIGNIFICANT CHANGE UP
TRIGL SERPL-MCNC: 138 MG/DL — SIGNIFICANT CHANGE UP
TSH SERPL-MCNC: 2.97 UIU/ML — SIGNIFICANT CHANGE UP (ref 0.27–4.2)
WBC # BLD: 6.23 K/UL — SIGNIFICANT CHANGE UP (ref 4.8–10.8)
WBC # FLD AUTO: 6.23 K/UL — SIGNIFICANT CHANGE UP (ref 4.8–10.8)

## 2022-10-14 PROCEDURE — 72158 MRI LUMBAR SPINE W/O & W/DYE: CPT | Mod: 26

## 2022-10-14 PROCEDURE — 99233 SBSQ HOSP IP/OBS HIGH 50: CPT

## 2022-10-14 PROCEDURE — 72157 MRI CHEST SPINE W/O & W/DYE: CPT | Mod: 26

## 2022-10-14 RX ORDER — ALBUTEROL 90 UG/1
2 AEROSOL, METERED ORAL EVERY 6 HOURS
Refills: 0 | Status: DISCONTINUED | OUTPATIENT
Start: 2022-10-14 | End: 2022-10-19

## 2022-10-14 RX ORDER — METHADONE HYDROCHLORIDE 40 MG/1
190 TABLET ORAL ONCE
Refills: 0 | Status: DISCONTINUED | OUTPATIENT
Start: 2022-10-14 | End: 2022-10-14

## 2022-10-14 RX ORDER — INFLUENZA VIRUS VACCINE 15; 15; 15; 15 UG/.5ML; UG/.5ML; UG/.5ML; UG/.5ML
0.5 SUSPENSION INTRAMUSCULAR ONCE
Refills: 0 | Status: DISCONTINUED | OUTPATIENT
Start: 2022-10-14 | End: 2022-10-19

## 2022-10-14 RX ORDER — ACETAMINOPHEN 500 MG
975 TABLET ORAL EVERY 6 HOURS
Refills: 0 | Status: DISCONTINUED | OUTPATIENT
Start: 2022-10-14 | End: 2022-10-14

## 2022-10-14 RX ORDER — OXYCODONE HYDROCHLORIDE 5 MG/1
10 TABLET ORAL EVERY 4 HOURS
Refills: 0 | Status: DISCONTINUED | OUTPATIENT
Start: 2022-10-14 | End: 2022-10-19

## 2022-10-14 RX ORDER — OXYCODONE HYDROCHLORIDE 5 MG/1
5 TABLET ORAL EVERY 4 HOURS
Refills: 0 | Status: DISCONTINUED | OUTPATIENT
Start: 2022-10-14 | End: 2022-10-14

## 2022-10-14 RX ORDER — NALOXONE HYDROCHLORIDE 4 MG/.1ML
4 SPRAY NASAL ONCE
Refills: 0 | Status: DISCONTINUED | OUTPATIENT
Start: 2022-10-14 | End: 2022-10-19

## 2022-10-14 RX ORDER — ENOXAPARIN SODIUM 100 MG/ML
40 INJECTION SUBCUTANEOUS EVERY 24 HOURS
Refills: 0 | Status: DISCONTINUED | OUTPATIENT
Start: 2022-10-14 | End: 2022-10-16

## 2022-10-14 RX ORDER — ALPRAZOLAM 0.25 MG
2 TABLET ORAL THREE TIMES A DAY
Refills: 0 | Status: DISCONTINUED | OUTPATIENT
Start: 2022-10-14 | End: 2022-10-19

## 2022-10-14 RX ADMIN — Medication 2 MILLIGRAM(S): at 17:21

## 2022-10-14 RX ADMIN — OXYCODONE HYDROCHLORIDE 10 MILLIGRAM(S): 5 TABLET ORAL at 17:25

## 2022-10-14 RX ADMIN — ENOXAPARIN SODIUM 40 MILLIGRAM(S): 100 INJECTION SUBCUTANEOUS at 15:26

## 2022-10-14 RX ADMIN — OXYCODONE HYDROCHLORIDE 10 MILLIGRAM(S): 5 TABLET ORAL at 18:05

## 2022-10-14 RX ADMIN — METHADONE HYDROCHLORIDE 190 MILLIGRAM(S): 40 TABLET ORAL at 22:36

## 2022-10-14 NOTE — CONSULT NOTE ADULT - SUBJECTIVE AND OBJECTIVE BOX
CANDE DIETZ 42y, Female Allergy: No Known Allergies   CHIEF COMPLAINT:   HPI: 43 y/o F pt w/ PMH/o HTN, HLD, GERD, Opioid abuse, active smoker, hypothyroidism presents to the ED w/ RT sided epigastric pain radiating across to her mid-back for the past several days. Pt reports the pain is sharp, intermittent, moderate. Pt reports the pain persists and denies eliciting factors (eating, physical exertion). Pt denies f/c/b, n/v, trauma. Pt reports no hx/o similar pain in the past.  In the ED,  · BP Systolic	114 mm Hg · BP Diastolic	82 mm Hg · Heart Rate	  118 /min · Respiration Rate (breaths/min)	18 /min · Temp (F)	97.9 Degrees F · Temp (C)	36.6 Degrees C · Temp site	oral · SpO2 (%)	99 % CT abdomen/pelvis: Inferior T7 and superior T8 endplate erosive changes with adjacent soft tissue prominence suspicious for an infectious discitis/osteomyelitis osteomyelitis.T10 superior endplate Schmorl's node. This has a benign appearance  Infectious Diseases History: Old Micro Data/Cultures:   FAMILY HISTORY:  PAST MEDICAL & SURGICAL HISTORY:   SOCIAL HISTORY Social History: Active smoker, 25 pack yr smoking hx On Methadone for opioid abuse Denies etoh use (13 Oct 2022 13:55)   Recent Travel: Other Exposures:   ROS General: Denies rigors, nightsweats HEENT: Denies headache, rhinorrhea, sore throat, eye pain CV: Denies CP, palpitations PULM: Denies wheezing, hemoptysis GI: Denies hematemesis, hematochezia, melena : Denies discharge, hematuria MSK: Denies arthralgias, myalgias SKIN: Denies rash, lesions NEURO: Denies paresthesias, weakness PSYCH: Denies depression, anxiety  VITALS: T(F): 97.6, Max: 97.6 (10-13-22 @ 20:22) HR: 63 BP: 103/58 RR: 17Vital Signs Last 24 Hrs T(C): 36.4 (14 Oct 2022 07:08), Max: 36.4 (13 Oct 2022 20:22) T(F): 97.6 (14 Oct 2022 07:08), Max: 97.6 (13 Oct 2022 20:22) HR: 63 (14 Oct 2022 07:08) (63 - 69) BP: 103/58 (14 Oct 2022 07:08) (97/65 - 114/71) BP(mean): -- RR: 17 (14 Oct 2022 07:08) (17 - 18) SpO2: 98% (13 Oct 2022 22:25) (97% - 98%)  Parameters below as of 13 Oct 2022 20:22 Patient On (Oxygen Delivery Method): room air    PHYSICAL EXAM: *** GENERAL: NAD, speaks in full sentences, no signs of respiratory distress, cushingoid appearance HEAD:  Atraumatic, Normocephalic CHEST/LUNG: Clear to auscultation bilaterally; No wheeze; No crackles; No accessory muscles used HEART: Regular rate and rhythm; No murmurs;  ABDOMEN: Soft, Nontender, Nondistended; Bowel sounds present; No guarding EXTREMITIES:  2+ Peripheral Pulses, No cyanosis or edema PSYCH: AAOx3 NEUROLOGY: non-focal SKIN: No rashes or lesions  TESTS & MEASUREMENTS:                      11.6  6.23  )-----------( 333      ( 14 Oct 2022 07:53 )            37.1   10-13  135  |  95<L>  |  19 ----------------------------<  84 3.9   |  30  |  1.0  Ca    9.9      13 Oct 2022 02:29  TPro  8.7<H>  /  Alb  4.4  /  TBili  0.3  /  DBili  <0.2  /  AST  25  /  ALT  23  /  AlkPhos  116<H>  10-13   LIVER FUNCTIONS - ( 13 Oct 2022 02:29 ) Alb: 4.4 g/dL / Pro: 8.7 g/dL / ALK PHOS: 116 U/L / ALT: 23 U/L / AST: 25 U/L / GGT: x         Urinalysis Basic - ( 13 Oct 2022 01:20 )  Color: Yellow / Appearance: Clear / S.023 / pH: x Gluc: x / Ketone: Negative  / Bili: Negative / Urobili: <2 mg/dL  Blood: x / Protein: Trace / Nitrite: Negative  Leuk Esterase: Moderate / RBC: 4 /HPF / WBC 15 /HPF  Sq Epi: x / Non Sq Epi: 13 /HPF / Bacteria: Few    Culture - Urine (collected 10-13-22 @ 01:20) Source: Clean Catch Clean Catch (Midstream) Final Report (10-14-22 @ 07:39):   <10,000 CFU/mL Normal Urogenital Glenna    Blood Gas Venous - Lactate: 1.20 mmol/L (10-13-22 @ 02:52) Lactate, Blood: 1.1 mmol/L (10-13-22 @ 02:29)   INFECTIOUS DISEASES TESTING   RADIOLOGY & ADDITIONAL TESTS: I have personally reviewed the last available Chest xray CXR Xray Chest 1 View- PORTABLE-Urgent:  ACC: [id number] EXAM:  XR CHEST PORTABLE URGENT 1V                        PROCEDURE DATE:  10/13/2022      INTERPRETATION:  Clinical History / Reason for exam: Pain  Comparison : Chest radiograph None.  Technique/Positioning: Adequate.  Findings:  Support devices: None.  Cardiac/mediastinum/hilum: Unremarkable.  Lung parenchyma/Pleura: Within normal limits.  Skeleton/soft tissues: Unremarkable.  Impression:  No radiographic evidence of acute cardiopulmonary disease.  Refer to the report of the CT scan of the chest, abdomen and pelvis  performed on the same day.  --- End of Report ---      TAVO JENKINS MD; Attending Radiologist This document has been electronically signed. Oct 13 2022  6:47AM (10-13-22 @ 02:25)   CT CT Angio Chest PE Protocol w/ IV Cont:  ACC: [id number] EXAM:  CT ABDOMEN AND PELVIS IC                       ACC: [id number] EXAM:  CT ANGIO CHEST PULM ART [facility]                        *** ADDENDUM***  Dr. Lang Barahona discussed preliminary findings with DAVION LEE MD; on 10/13/2022 7:00 AM with readback.  --- End of Report ---  *** END OF ADDENDUM***   PROCEDURE DATE:  10/13/2022      INTERPRETATION:  CTA chest with IV contrast, CT abdomen and pelvis with  IV contrast  CLINICAL HISTORY/REASON FOR EXAM: Rightlower chest pain, elevated  d-dimer.  TECHNIQUE: CTA chest with IV contrast, CT abdomen and pelvis with IV  contrast performed. Initial CTA images of chest obtained with 100 cc  Omnipaque 350 IV contrast administration. Subsequently, CT images of  abdomen and pelvis obtained with IV contrast administration. Oral  contrast was not administered. Reformatted images in the coronal and  sagittal planes were acquired. 3D (MIP images) generated.  COMPARISON: None.   FINDINGS:  CHEST:  PULMONARY ARTERIES: No evidence of acute pulmonary embolism.  LUNGS, PLEURA, AIRWAYS: Trace bibasilar subsegmental atelectatic changes.  No pleural effusion. No pneumothorax. No evidence of central  endobronchial obstruction.  THORACIC NODES: No mediastinal, hilar, supraclavicular, or axillary  lymphadenopathy.  MEDIASTINUM/GREAT VESSELS: No pericardial effusion. Heart size is within  normal limits. The aorta and main pulmonary artery are of normal caliber.  ABDOMEN/PELVIS:  HEPATOBILIARY: Unremarkable.  SPLEEN: There are several splenic calcifications compatible with sequela  of prior infection  PANCREAS: Unremarkable.  ADRENAL GLANDS: Unremarkable.  KIDNEYS: Symmetric pattern of renal enhancement. No hydronephrosis  bilaterally.  ABDOMINOPELVIC NODES: No lymphadenopathy.  PELVIC ORGANS: Unremarkable.  PERITONEUM/MESENTERY/BOWEL: No bowel obstruction. No ascites or  pneumoperitoneum.  BONES/SOFT TISSUES: Inferior T7 and superior T8 endplate erosive changes  with adjacent soft tissue prominence suspicious for an infectious  discitis/osteomyelitis osteomyelitis.  T10 superior endplate Schmorl's node.  OTHER: Normal caliber abdominal aorta   IMPRESSION:   No evidence of acute pulmonary embolism.  Inferior T7 and superior T8 endplate erosive changes with adjacent soft  tissue prominence suspicious for an infectious discitis/osteomyelitis  osteomyelitis.  T10 superior endplate Schmorl's node. This has a benign appearance   --- End ofReport ---  ***Please see the addendum at the top of this report. It may contain  additional important information or changes.****   LANG BARAHONA MD; Resident Radiologist This document has been electronically signed. GIL LEON MD; Attending Radiologist This document has been electronically signed. Oct 13 2022  6:32AM Addend:GIL LEON MD; Attending Radiologist This addendum was electronically signed on: Oct 13 2022  7:05AM. (10-13-22 @ 05:33) CT Abdomen and Pelvis w/ IV Cont:  ACC: [id number] EXAM:  CT ABDOMEN AND PELVIS IC                       ACC: [id number] EXAM:  CT ANGIO CHEST PULM ART [facility]                        *** ADDENDUM***  Dr. Lang Barahona discussed preliminary findings with DAVION LEE MD; on 10/13/2022 7:00 AM with readback.  --- End of Report ---  *** END OF ADDENDUM***   PROCEDURE DATE:  10/13/2022      INTERPRETATION:  CTA chest with IV contrast, CT abdomen and pelvis with  IV contrast  CLINICAL HISTORY/REASON FOR EXAM: Rightlower chest pain, elevated  d-dimer.  TECHNIQUE: CTA chest with IV contrast, CT abdomen and pelvis with IV  contrast performed. Initial CTA images of chest obtained with 100 cc  Omnipaque 350 IV contrast administration. Subsequently, CT images of  abdomen and pelvis obtained with IV contrast administration. Oral  contrast was not administered. Reformatted images in the coronal and  sagittal planes were acquired. 3D (MIP images) generated.  COMPARISON: None.   FINDINGS:  CHEST:  PULMONARY ARTERIES: No evidence of acute pulmonary embolism.  LUNGS, PLEURA, AIRWAYS: Trace bibasilar subsegmental atelectatic changes.  No pleural effusion. No pneumothorax. No evidence of central  endobronchial obstruction.  THORACIC NODES: No mediastinal, hilar, supraclavicular, or axillary  lymphadenopathy.  MEDIASTINUM/GREAT VESSELS: No pericardial effusion. Heart size is within  normal limits. The aorta and main pulmonary artery are of normal caliber.  ABDOMEN/PELVIS:  HEPATOBILIARY: Unremarkable.  SPLEEN: There are several splenic calcifications compatible with sequela  of prior infection  PANCREAS: Unremarkable.  ADRENAL GLANDS: Unremarkable.  KIDNEYS: Symmetric pattern of renal enhancement. No hydronephrosis  bilaterally.  ABDOMINOPELVIC NODES: No lymphadenopathy.  PELVIC ORGANS: Unremarkable.  PERITONEUM/MESENTERY/BOWEL: No bowel obstruction. No ascites or  pneumoperitoneum.  BONES/SOFT TISSUES: Inferior T7 and superior T8 endplate erosive changes  with adjacent soft tissue prominence suspicious for an infectious  discitis/osteomyelitis osteomyelitis.  T10 superior endplate Schmorl's node.  OTHER: Normal caliber abdominal aorta   IMPRESSION:   No evidence of acute pulmonary embolism.  Inferior T7 and superior T8 endplate erosive changes with adjacent soft  tissue prominence suspicious for an infectious discitis/osteomyelitis  osteomyelitis.  T10 superior endplate Schmorl's node. This has a benign appearance   --- End ofReport ---  ***Please see the addendum at the top of this report. It may contain  additional important information or changes.****   LANG BARAHONA MD; Resident Radiologist This document has been electronically signed. GIL LEON MD; Attending Radiologist This document has been electronically signed. Oct 13 2022  6:32AM Addend:GIL LEON MD; Attending Radiologist This addendum was electronically signed on: Oct 13 2022  7:05AM. (10-13-22 @ 05:33)   CARDIOLOGY TESTING 12 Lead ECG:  Ventricular Rate 61 BPM  Atrial Rate 61 BPM  P-R Interval 166 ms  QRS Duration 88 ms  Q-T Interval 498 ms  QTC Calculation(Bazett) 501 ms  P Axis 41 degrees  R Axis 42 degrees  T Axis 34 degrees  Diagnosis Line Normal sinus rhythm Prolonged QT Abnormal ECG  Confirmed by Enio Horta ([id number]) on 10/13/2022 3:29:40 PM (10-13-22 @ 13:05)   All available historical records have been reviewed  MEDICATIONS acetaminophen     Tablet .. 975 influenza   Vaccine 0.5 lactated ringers. 1000 pantoprazole    Tablet 40   ANTIBIOTICS:   All available historical data has been reviewed  ASSESSMENT 43 y/o F pt w/ PMH/o HTN, HLD, GERD, Opioid abuse, active smoker, hypothyroidism presents to the ED w/ RT sided epigastric pain radiating across to her mid-back for the past several days.  IMPRESSION #Possible osteomyelitis - Inferior T7 and superior T8 endplate erosive changes - CT Abdomen/pelvis noncon: Inferior T7 and superior T8 endplate erosive changes with adjacent soft tissue prominence suspicious for an infectious discitis/osteomyelitis osteomyelitis. - TTP to thoracic spine on exam, although abdominal pain inconsistent w/ findings - WC 9, Afebrile    RECOMMENDATIONS - f/u pending cultures - ***  This is a pended note. All final recommendations to follow pending discussion with ID Attending   CANDE DIETZ 42y, Female Allergy: No Known Allergies   CHIEF COMPLAINT:   HPI: 43 y/o F pt w/ PMH/o HTN, HLD, GERD, Opioid abuse, active smoker, hypothyroidism presents to the ED w/ RT sided epigastric pain radiating across to her mid-back for the past 2 weeks. Pt reports she slipped when cleaning her kitchen and hit her back about 2.5 weeks ago. Pt reports the pain is sharp, intermittent, rated 7.5/10. Pt reports the pain persists and denies eliciting factors (eating, physical exertion). Pt denies f/c/b, n/v, trauma. Pt reports no hx/o similar pain in the past. Denies any recent IV drug use.   Pt reports a she had an MRI done at [place] [facility] which revealed she has herniated discs in the C-spine and lumbar region. She states her current pain is different compared to the back pain that she experiences.   In the ED,  · BP Systolic	114 mm Hg · BP Diastolic	82 mm Hg · Heart Rate	  118 /min · Respiration Rate (breaths/min)	18 /min · Temp (F)	97.9 Degrees F · Temp (C)	36.6 Degrees C · Temp site	oral · SpO2 (%)	99 % CT abdomen/pelvis: Inferior T7 and superior T8 endplate erosive changes with adjacent soft tissue prominence suspicious for an infectious discitis/osteomyelitis osteomyelitis.T10 superior endplate Schmorl's node. This has a benign appearance  Infectious Diseases History: Old Micro Data/Cultures:   FAMILY HISTORY:  PAST MEDICAL & SURGICAL HISTORY:   SOCIAL HISTORY Social History: Active smoker, 25 pack yr smoking hx On Methadone for opioid abuse Denies etoh use (13 Oct 2022 13:55)   Recent Travel: Other Exposures:   ROS General: Denies rigors, nightsweats HEENT: Denies headache, rhinorrhea, sore throat, eye pain CV: Denies CP, palpitations PULM: Denies wheezing, hemoptysis GI: Denies hematemesis, hematochezia, melena : Denies discharge, hematuria MSK: Denies arthralgias, myalgias SKIN: Denies rash, lesions NEURO: Denies paresthesias, weakness PSYCH: Denies depression, anxiety  VITALS: T(F): 97.6, Max: 97.6 (10-13-22 @ 20:22) HR: 63 BP: 103/58 RR: 17Vital Signs Last 24 Hrs T(C): 36.4 (14 Oct 2022 07:08), Max: 36.4 (13 Oct 2022 20:22) T(F): 97.6 (14 Oct 2022 07:08), Max: 97.6 (13 Oct 2022 20:22) HR: 63 (14 Oct 2022 07:08) (63 - 69) BP: 103/58 (14 Oct 2022 07:08) (97/65 - 114/71) BP(mean): -- RR: 17 (14 Oct 2022 07:08) (17 - 18) SpO2: 98% (13 Oct 2022 22:25) (97% - 98%)  Parameters below as of 13 Oct 2022 20:22 Patient On (Oxygen Delivery Method): room air    PHYSICAL EXAM: *** GENERAL: NAD, speaks in full sentences, no signs of respiratory distress, cushingoid appearance HEAD:  Atraumatic, Normocephalic CHEST/LUNG: Clear to auscultation bilaterally; No wheeze; No crackles; No accessory muscles used HEART: Regular rate and rhythm; No murmurs;  ABDOMEN: Soft, Nontender, Nondistended; Bowel sounds present; No guarding EXTREMITIES:  2+ Peripheral Pulses, No cyanosis or edema PSYCH: AAOx3 NEUROLOGY: non-focal SKIN: No rashes or lesions  TESTS & MEASUREMENTS:                      11.6  6.23  )-----------( 333      ( 14 Oct 2022 07:53 )            37.1   10-13  135  |  95<L>  |  19 ----------------------------<  84 3.9   |  30  |  1.0  Ca    9.9      13 Oct 2022 02:29  TPro  8.7<H>  /  Alb  4.4  /  TBili  0.3  /  DBili  <0.2  /  AST  25  /  ALT  23  /  AlkPhos  116<H>  10-13   LIVER FUNCTIONS - ( 13 Oct 2022 02:29 ) Alb: 4.4 g/dL / Pro: 8.7 g/dL / ALK PHOS: 116 U/L / ALT: 23 U/L / AST: 25 U/L / GGT: x         Urinalysis Basic - ( 13 Oct 2022 01:20 )  Color: Yellow / Appearance: Clear / S.023 / pH: x Gluc: x / Ketone: Negative  / Bili: Negative / Urobili: <2 mg/dL  Blood: x / Protein: Trace / Nitrite: Negative  Leuk Esterase: Moderate / RBC: 4 /HPF / WBC 15 /HPF  Sq Epi: x / Non Sq Epi: 13 /HPF / Bacteria: Few    Culture - Urine (collected 10-13-22 @ 01:20) Source: Clean Catch Clean Catch (Midstream) Final Report (10-14-22 @ 07:39):   <10,000 CFU/mL Normal Urogenital Glenna    Blood Gas Venous - Lactate: 1.20 mmol/L (10-13-22 @ 02:52) Lactate, Blood: 1.1 mmol/L (10-13-22 @ 02:29)   INFECTIOUS DISEASES TESTING   RADIOLOGY & ADDITIONAL TESTS: I have personally reviewed the last available Chest xray CXR Xray Chest 1 View- PORTABLE-Urgent:  ACC: [id number] EXAM:  XR CHEST PORTABLE URGENT 1V                        PROCEDURE DATE:  10/13/2022      INTERPRETATION:  Clinical History / Reason for exam: Pain  Comparison : Chest radiograph None.  Technique/Positioning: Adequate.  Findings:  Support devices: None.  Cardiac/mediastinum/hilum: Unremarkable.  Lung parenchyma/Pleura: Within normal limits.  Skeleton/soft tissues: Unremarkable.  Impression:  No radiographic evidence of acute cardiopulmonary disease.  Refer to the report of the CT scan of the chest, abdomen and pelvis  performed on the same day.  --- End of Report ---      TAVO JENKINS MD; Attending Radiologist This document has been electronically signed. Oct 13 2022  6:47AM (10-13-22 @ 02:25)   CT CT Angio Chest PE Protocol w/ IV Cont:  ACC: [id number] EXAM:  CT ABDOMEN AND PELVIS IC                       ACC: [id number] EXAM:  CT ANGIO CHEST PULM ART [facility]                        *** ADDENDUM***  Dr. Lang Barahona discussed preliminary findings with DAVION LEE MD; on 10/13/2022 7:00 AM with readback.  --- End of Report ---  *** END OF ADDENDUM***   PROCEDURE DATE:  10/13/2022      INTERPRETATION:  CTA chest with IV contrast, CT abdomen and pelvis with  IV contrast  CLINICAL HISTORY/REASON FOR EXAM: Rightlower chest pain, elevated  d-dimer.  TECHNIQUE: CTA chest with IV contrast, CT abdomen and pelvis with IV  contrast performed. Initial CTA images of chest obtained with 100 cc  Omnipaque 350 IV contrast administration. Subsequently, CT images of  abdomen and pelvis obtained with IV contrast administration. Oral  contrast was not administered. Reformatted images in the coronal and  sagittal planes were acquired. 3D (MIP images) generated.  COMPARISON: None.   FINDINGS:  CHEST:  PULMONARY ARTERIES: No evidence of acute pulmonary embolism.  LUNGS, PLEURA, AIRWAYS: Trace bibasilar subsegmental atelectatic changes.  No pleural effusion. No pneumothorax. No evidence of central  endobronchial obstruction.  THORACIC NODES: No mediastinal, hilar, supraclavicular, or axillary  lymphadenopathy.  MEDIASTINUM/GREAT VESSELS: No pericardial effusion. Heart size is within  normal limits. The aorta and main pulmonary artery are of normal caliber.  ABDOMEN/PELVIS:  HEPATOBILIARY: Unremarkable.  SPLEEN: There are several splenic calcifications compatible with sequela  of prior infection  PANCREAS: Unremarkable.  ADRENAL GLANDS: Unremarkable.  KIDNEYS: Symmetric pattern of renal enhancement. No hydronephrosis  bilaterally.  ABDOMINOPELVIC NODES: No lymphadenopathy.  PELVIC ORGANS: Unremarkable.  PERITONEUM/MESENTERY/BOWEL: No bowel obstruction. No ascites or  pneumoperitoneum.  BONES/SOFT TISSUES: Inferior T7 and superior T8 endplate erosive changes  with adjacent soft tissue prominence suspicious for an infectious  discitis/osteomyelitis osteomyelitis.  T10 superior endplate Schmorl's node.  OTHER: Normal caliber abdominal aorta   IMPRESSION:   No evidence of acute pulmonary embolism.  Inferior T7 and superior T8 endplate erosive changes with adjacent soft  tissue prominence suspicious for an infectious discitis/osteomyelitis  osteomyelitis.  T10 superior endplate Schmorl's node. This has a benign appearance   --- End ofReport ---  ***Please see the addendum at the top of this report. It may contain  additional important information or changes.****   LANG BARAHONA MD; Resident Radiologist This document has been electronically signed. GIL LEON MD; Attending Radiologist This document has been electronically signed. Oct 13 2022  6:32AM Addend:GIL LEON MD; Attending Radiologist This addendum was electronically signed on: Oct 13 2022  7:05AM. (10-13-22 @ 05:33) CT Abdomen and Pelvis w/ IV Cont:  ACC: [id number] EXAM:  CT ABDOMEN AND PELVIS IC                       ACC: [id number] EXAM:  CT ANGIO CHEST PULM ART [facility]                        *** ADDENDUM***  Dr. Lang Barahona discussed preliminary findings with DAVION LEE MD; on 10/13/2022 7:00 AM with readback.  --- End of Report ---  *** END OF ADDENDUM***   PROCEDURE DATE:  10/13/2022      INTERPRETATION:  CTA chest with IV contrast, CT abdomen and pelvis with  IV contrast  CLINICAL HISTORY/REASON FOR EXAM: Rightlower chest pain, elevated  d-dimer.  TECHNIQUE: CTA chest with IV contrast, CT abdomen and pelvis with IV  contrast performed. Initial CTA images of chest obtained with 100 cc  Omnipaque 350 IV contrast administration. Subsequently, CT images of  abdomen and pelvis obtained with IV contrast administration. Oral  contrast was not administered. Reformatted images in the coronal and  sagittal planes were acquired. 3D (MIP images) generated.  COMPARISON: None.   FINDINGS:  CHEST:  PULMONARY ARTERIES: No evidence of acute pulmonary embolism.  LUNGS, PLEURA, AIRWAYS: Trace bibasilar subsegmental atelectatic changes.  No pleural effusion. No pneumothorax. No evidence of central  endobronchial obstruction.  THORACIC NODES: No mediastinal, hilar, supraclavicular, or axillary  lymphadenopathy.  MEDIASTINUM/GREAT VESSELS: No pericardial effusion. Heart size is within  normal limits. The aorta and main pulmonary artery are of normal caliber.  ABDOMEN/PELVIS:  HEPATOBILIARY: Unremarkable.  SPLEEN: There are several splenic calcifications compatible with sequela  of prior infection  PANCREAS: Unremarkable.  ADRENAL GLANDS: Unremarkable.  KIDNEYS: Symmetric pattern of renal enhancement. No hydronephrosis  bilaterally.  ABDOMINOPELVIC NODES: No lymphadenopathy.  PELVIC ORGANS: Unremarkable.  PERITONEUM/MESENTERY/BOWEL: No bowel obstruction. No ascites or  pneumoperitoneum.  BONES/SOFT TISSUES: Inferior T7 and superior T8 endplate erosive changes  with adjacent soft tissue prominence suspicious for an infectious  discitis/osteomyelitis osteomyelitis.  T10 superior endplate Schmorl's node.  OTHER: Normal caliber abdominal aorta   IMPRESSION:   No evidence of acute pulmonary embolism.  Inferior T7 and superior T8 endplate erosive changes with adjacent soft  tissue prominence suspicious for an infectious discitis/osteomyelitis  osteomyelitis.  T10 superior endplate Schmorl's node. This has a benign appearance   --- End ofReport ---  ***Please see the addendum at the top of this report. It may contain  additional important information or changes.****   LANG BARAHONA MD; Resident Radiologist This document has been electronically signed. GIL LEON MD; Attending Radiologist This document has been electronically signed. Oct 13 2022  6:32AM Addend:GIL LEON MD; Attending Radiologist This addendum was electronically signed on: Oct 13 2022  7:05AM. (10-13-22 @ 05:33)   CARDIOLOGY TESTING 12 Lead ECG:  Ventricular Rate 61 BPM  Atrial Rate 61 BPM  P-R Interval 166 ms  QRS Duration 88 ms  Q-T Interval 498 ms  QTC Calculation(Bazett) 501 ms  P Axis 41 degrees  R Axis 42 degrees  T Axis 34 degrees  Diagnosis Line Normal sinus rhythm Prolonged QT Abnormal ECG  Confirmed by Enio Horta ([id number]) on 10/13/2022 3:29:40 PM (10-13-22 @ 13:05)   All available historical records have been reviewed  MEDICATIONS acetaminophen     Tablet .. 975 influenza   Vaccine 0.5 lactated ringers. 1000 pantoprazole    Tablet 40   ANTIBIOTICS:   All available historical data has been reviewed  ASSESSMENT 43 y/o F pt w/ PMH/o HTN, HLD, GERD, Opioid abuse, active smoker, hypothyroidism presents to the ED w/ RT sided epigastric pain radiating across to her mid-back for the past several days.  IMPRESSION #Possible osteomyelitis - Inferior T7 and superior T8 endplate erosive changes - CT Abdomen/pelvis noncon: Inferior T7 and superior T8 endplate erosive changes with adjacent soft tissue prominence suspicious for an infectious discitis/osteomyelitis osteomyelitis. - TTP to thoracic spine on exam, although abdominal pain inconsistent w/ findings - WC 9, Afebrile    RECOMMENDATIONS - f/u pending cultures - ***  This is a pended note. All final recommendations to follow pending discussion with ID Attending   CANDE DIETZ 42y, Female Allergy: No Known Allergies   CHIEF COMPLAINT:   HPI: 41 y/o F pt w/ PMH/o HTN, HLD, GERD, Opioid abuse, active smoker, hypothyroidism presents to the ED w/ RT sided epigastric pain radiating across to her mid-back for the past several days. Pt reports the pain is sharp, intermittent, moderate. Pt reports the pain persists and denies eliciting factors (eating, physical exertion). Pt denies f/c/b, n/v, trauma. Pt reports no hx/o similar pain in the past.  Patient reports progressively worsening abdominal pain that began 2 weeks ago. Patient states 2.5 weeks ago while cleaning her kitchen she slipped and hit her right side on the door frame. Denies hit her head at that time. She reports a history of gastric ulcer but states that the pain is different and is also present in her mid back. She describes the pain as sharp and rated 7.5/10. Pain worsens with certain movements and improves when she is squatting or with certain positions. She also reports difficulty taking a deep breath due to the pain. Denies any fever, chills, chest pain, palpitations, nausea, vomiting, diarrhea, or constipation. Pt  reports recently smoking marijuana and snorting cocaine. Pt reports past history of IV heroin use but denies any recent IV drug use. She states she has been on Methadone for the past 10 years.   Pt reports a she had an MRI done at [facility] which revealed she has herniated discs in the C-spine and lumbar region. She states her current pain is different compared to the back pain that she experiences.   In the ED,  · BP Systolic	114 mm Hg · BP Diastolic	82 mm Hg · Heart Rate	  118 /min · Respiration Rate (breaths/min)	18 /min · Temp (F)	97.9 Degrees F · Temp (C)	36.6 Degrees C · Temp site	oral · SpO2 (%)	99 % CT abdomen/pelvis: Inferior T7 and superior T8 endplate erosive changes with adjacent soft tissue prominence suspicious for an infectious discitis/osteomyelitis osteomyelitis.T10 superior endplate Schmorl's node. This has a benign appearance  Infectious Diseases History: Old Micro Data/Cultures:   FAMILY HISTORY:  PAST MEDICAL & SURGICAL HISTORY:   SOCIAL HISTORY Social History: Active smoker, 25 pack yr smoking hx On Methadone for opioid abuse Denies etoh use (13 Oct 2022 13:55)   Recent Travel: Other Exposures:   ROS General: Denies rigors, nightsweats HEENT: Denies headache, rhinorrhea, sore throat, eye pain CV: Denies CP, palpitations PULM: Denies wheezing, hemoptysis GI: Denies hematemesis, hematochezia, melena : Denies discharge, hematuria MSK: Denies arthralgias, myalgias SKIN: Denies rash, lesions NEURO: Denies paresthesias, weakness PSYCH: Denies depression, anxiety  VITALS: T(F): 97.6, Max: 97.6 (10-13-22 @ 20:22) HR: 63 BP: 103/58 RR: 17Vital Signs Last 24 Hrs T(C): 36.4 (14 Oct 2022 07:08), Max: 36.4 (13 Oct 2022 20:22) T(F): 97.6 (14 Oct 2022 07:08), Max: 97.6 (13 Oct 2022 20:22) HR: 63 (14 Oct 2022 07:08) (63 - 69) BP: 103/58 (14 Oct 2022 07:08) (97/65 - 114/71) BP(mean): -- RR: 17 (14 Oct 2022 07:08) (17 - 18) SpO2: 98% (13 Oct 2022 22:25) (97% - 98%)  Parameters below as of 13 Oct 2022 20:22 Patient On (Oxygen Delivery Method): room air    PHYSICAL EXAM: GENERAL: NAD, pt lying in bed HEAD:  Atraumatic, Normocephalic CHEST/LUNG: Clear to auscultation bilaterally; No wheeze; No crackles; No accessory muscles used HEART: Regular rate and rhythm; No murmurs;  ABDOMEN: Soft. Nondistended; Bowel sounds present; No guarding.  EXTREMITIES:  2+ Peripheral Pulses, No cyanosis or edema BACK: No tenderness to spine. Tenderness paraspinal region in thoracic region.  NEUROLOGY: non-focal. AAOx3. Negative straight leg raise, bilaterally. SKIN: No rashes or lesions  TESTS & MEASUREMENTS:                      11.6  6.23  )-----------( 333      ( 14 Oct 2022 07:53 )            37.1   10-13  135  |  95<L>  |  19 ----------------------------<  84 3.9   |  30  |  1.0  Ca    9.9      13 Oct 2022 02:29  TPro  8.7<H>  /  Alb  4.4  /  TBili  0.3  /  DBili  <0.2  /  AST  25  /  ALT  23  /  AlkPhos  116<H>  10-13   LIVER FUNCTIONS - ( 13 Oct 2022 02:29 ) Alb: 4.4 g/dL / Pro: 8.7 g/dL / ALK PHOS: 116 U/L / ALT: 23 U/L / AST: 25 U/L / GGT: x         Urinalysis Basic - ( 13 Oct 2022 01:20 )  Color: Yellow / Appearance: Clear / S.023 / pH: x Gluc: x / Ketone: Negative  / Bili: Negative / Urobili: <2 mg/dL  Blood: x / Protein: Trace / Nitrite: Negative  Leuk Esterase: Moderate / RBC: 4 /HPF / WBC 15 /HPF  Sq Epi: x / Non Sq Epi: 13 /HPF / Bacteria: Few    Culture - Urine (collected 10-13-22 @ 01:20) Source: Clean Catch Clean Catch (Midstream) Final Report (10-14-22 @ 07:39):   <10,000 CFU/mL Normal Urogenital Glenna    Blood Gas Venous - Lactate: 1.20 mmol/L (10-13-22 @ 02:52) Lactate, Blood: 1.1 mmol/L (10-13-22 @ 02:29)   INFECTIOUS DISEASES TESTING   RADIOLOGY & ADDITIONAL TESTS: I have personally reviewed the last available Chest xray CXR Xray Chest 1 View- PORTABLE-Urgent:  ACC: [id number] EXAM:  XR CHEST PORTABLE URGENT 1V                        PROCEDURE DATE:  10/13/2022      INTERPRETATION:  Clinical History / Reason for exam: Pain  Comparison : Chest radiograph None.  Technique/Positioning: Adequate.  Findings:  Support devices: None.  Cardiac/mediastinum/hilum: Unremarkable.  Lung parenchyma/Pleura: Within normal limits.  Skeleton/soft tissues: Unremarkable.  Impression:  No radiographic evidence of acute cardiopulmonary disease.  Refer to the report of the CT scan of the chest, abdomen and pelvis  performed on the same day.  --- End of Report ---      TAVO JENKINS MD; Attending Radiologist This document has been electronically signed. Oct 13 2022  6:47AM (10-13-22 @ 02:25)   CT CT Angio Chest PE Protocol w/ IV Cont:  ACC: [id number] EXAM:  CT ABDOMEN AND PELVIS IC                       ACC: [id number] EXAM:  CT ANGIO CHEST PULM ART [facility]                        *** ADDENDUM***  Dr. Lang Barahona discussed preliminary findings with DAVION LEE MD; on 10/13/2022 7:00 AM with readback.  --- End of Report ---  *** END OF ADDENDUM***   PROCEDURE DATE:  10/13/2022      INTERPRETATION:  CTA chest with IV contrast, CT abdomen and pelvis with  IV contrast  CLINICAL HISTORY/REASON FOR EXAM: Rightlower chest pain, elevated  d-dimer.  TECHNIQUE: CTA chest with IV contrast, CT abdomen and pelvis with IV  contrast performed. Initial CTA images of chest obtained with 100 cc  Omnipaque 350 IV contrast administration. Subsequently, CT images of  abdomen and pelvis obtained with IV contrast administration. Oral  contrast was not administered. Reformatted images in the coronal and  sagittal planes were acquired. 3D (MIP images) generated.  COMPARISON: None.   FINDINGS:  CHEST:  PULMONARY ARTERIES: No evidence of acute pulmonary embolism.  LUNGS, PLEURA, AIRWAYS: Trace bibasilar subsegmental atelectatic changes.  No pleural effusion. No pneumothorax. No evidence of central  endobronchial obstruction.  THORACIC NODES: No mediastinal, hilar, supraclavicular, or axillary  lymphadenopathy.  MEDIASTINUM/GREAT VESSELS: No pericardial effusion. Heart size is within  normal limits. The aorta and main pulmonary artery are of normal caliber.  ABDOMEN/PELVIS:  HEPATOBILIARY: Unremarkable.  SPLEEN: There are several splenic calcifications compatible with sequela  of prior infection  PANCREAS: Unremarkable.  ADRENAL GLANDS: Unremarkable.  KIDNEYS: Symmetric pattern of renal enhancement. No hydronephrosis  bilaterally.  ABDOMINOPELVIC NODES: No lymphadenopathy.  PELVIC ORGANS: Unremarkable.  PERITONEUM/MESENTERY/BOWEL: No bowel obstruction. No ascites or  pneumoperitoneum.  BONES/SOFT TISSUES: Inferior T7 and superior T8 endplate erosive changes  with adjacent soft tissue prominence suspicious for an infectious  discitis/osteomyelitis osteomyelitis.  T10 superior endplate Schmorl's node.  OTHER: Normal caliber abdominal aorta   IMPRESSION:   No evidence of acute pulmonary embolism.  Inferior T7 and superior T8 endplate erosive changes with adjacent soft  tissue prominence suspicious for an infectious discitis/osteomyelitis  osteomyelitis.  T10 superior endplate Schmorl's node. This has a benign appearance   --- End ofReport ---  ***Please see the addendum at the top of this report. It may contain  additional important information or changes.****   LANG BARAHONA MD; Resident Radiologist This document has been electronically signed. GIL LEON MD; Attending Radiologist This document has been electronically signed. Oct 13 2022  6:32AM Addend:GIL LEON MD; Attending Radiologist This addendum was electronically signed on: Oct 13 2022  7:05AM. (10-13-22 @ 05:33) CT Abdomen and Pelvis w/ IV Cont:  ACC: [id number] EXAM:  CT ABDOMEN AND PELVIS IC                       ACC: [id number] EXAM:  CT ANGIO CHEST PULM ART [facility]                        *** ADDENDUM***  Dr. Lang Barahoan discussed preliminary findings with DAVION LEE MD; on 10/13/2022 7:00 AM with readback.  --- End of Report ---  *** END OF ADDENDUM***   PROCEDURE DATE:  10/13/2022      INTERPRETATION:  CTA chest with IV contrast, CT abdomen and pelvis with  IV contrast  CLINICAL HISTORY/REASON FOR EXAM: Rightlower chest pain, elevated  d-dimer.  TECHNIQUE: CTA chest with IV contrast, CT abdomen and pelvis with IV  contrast performed. Initial CTA images of chest obtained with 100 cc  Omnipaque 350 IV contrast administration. Subsequently, CT images of  abdomen and pelvis obtained with IV contrast administration. Oral  contrast was not administered. Reformatted images in the coronal and  sagittal planes were acquired. 3D (MIP images) generated.  COMPARISON: None.   FINDINGS:  CHEST:  PULMONARY ARTERIES: No evidence of acute pulmonary embolism.  LUNGS, PLEURA, AIRWAYS: Trace bibasilar subsegmental atelectatic changes.  No pleural effusion. No pneumothorax. No evidence of central  endobronchial obstruction.  THORACIC NODES: No mediastinal, hilar, supraclavicular, or axillary  lymphadenopathy.  MEDIASTINUM/GREAT VESSELS: No pericardial effusion. Heart size is within  normal limits. The aorta and main pulmonary artery are of normal caliber.  ABDOMEN/PELVIS:  HEPATOBILIARY: Unremarkable.  SPLEEN: There are several splenic calcifications compatible with sequela  of prior infection  PANCREAS: Unremarkable.  ADRENAL GLANDS: Unremarkable.  KIDNEYS: Symmetric pattern of renal enhancement. No hydronephrosis  bilaterally.  ABDOMINOPELVIC NODES: No lymphadenopathy.  PELVIC ORGANS: Unremarkable.  PERITONEUM/MESENTERY/BOWEL: No bowel obstruction. No ascites or  pneumoperitoneum.  BONES/SOFT TISSUES: Inferior T7 and superior T8 endplate erosive changes  with adjacent soft tissue prominence suspicious for an infectious  discitis/osteomyelitis osteomyelitis.  T10 superior endplate Schmorl's node.  OTHER: Normal caliber abdominal aorta   IMPRESSION:   No evidence of acute pulmonary embolism.  Inferior T7 and superior T8 endplate erosive changes with adjacent soft  tissue prominence suspicious for an infectious discitis/osteomyelitis  osteomyelitis.  T10 superior endplate Schmorl's node. This has a benign appearance   --- End ofReport ---  ***Please see the addendum at the top of this report. It may contain  additional important information or changes.****   LANG BARAHONA MD; Resident Radiologist This document has been electronically signed. GIL LEON MD; Attending Radiologist This document has been electronically signed. Oct 13 2022  6:32AM Addend:GIL LEON MD; Attending Radiologist This addendum was electronically signed on: Oct 13 2022  7:05AM. (10-13-22 @ 05:33)   CARDIOLOGY TESTING 12 Lead ECG:  Ventricular Rate 61 BPM  Atrial Rate 61 BPM  P-R Interval 166 ms  QRS Duration 88 ms  Q-T Interval 498 ms  QTC Calculation(Bazett) 501 ms  P Axis 41 degrees  R Axis 42 degrees  T Axis 34 degrees  Diagnosis Line Normal sinus rhythm Prolonged QT Abnormal ECG  Confirmed by Enio Horta ([id number]) on 10/13/2022 3:29:40 PM (10-13-22 @ 13:05)   All available historical records have been reviewed  MEDICATIONS acetaminophen     Tablet .. 975 influenza   Vaccine 0.5 lactated ringers. 1000 pantoprazole    Tablet 40   ANTIBIOTICS:   All available historical data has been reviewed  ASSESSMENT 41 y/o F pt w/ PMH/o HTN, HLD, GERD, Opioid abuse, active smoker, hypothyroidism presents to the ED w/ RT sided epigastric pain radiating across to her mid-back for that began 2 weeks ago.  IMPRESSION #Possible osteomyelitis - WBC 9, Afebrile - CT Abdomen/Pelvis: Inferior T7 and superior T8 endplate erosive changes with adjacent soft tissue prominence suspicious for an infectious discitis/osteomyelitis osteomyelitis. - TTP to paraspinal in thoracic region on exam     RECOMMENDATIONS - MRI, if positive for osteomyelitis, biopsy with IR - Repeat BCx - Hold abx - ESR, CRP  This is a pended note. All final recommendations to follow pending discussion with ID Attending   CANDE DIETZ 42y, Female Allergy: No Known Allergies   CHIEF COMPLAINT:   HPI: 43 y/o F pt w/ PMH/o HTN, HLD, GERD, Opioid abuse, active smoker, hypothyroidism presents to the ED w/ RT sided epigastric pain radiating across to her mid-back for the past several days. Pt reports the pain is sharp, intermittent, moderate. Pt reports the pain persists and denies eliciting factors (eating, physical exertion). Pt denies f/c/b, n/v, trauma. Pt reports no hx/o similar pain in the past.  Patient reports progressively worsening abdominal pain that began 2 weeks ago. Patient states 2.5 weeks ago while cleaning her kitchen she slipped and hit her right side on the door frame. Denies hit her head at that time. She reports a history of gastric ulcer but states that the pain is different and is also present in her mid back. She describes the pain as sharp and rated 7.5/10. Pain worsens with certain movements and improves when she is squatting or with certain positions. She also reports difficulty taking a deep breath due to the pain. Denies any fever, chills, chest pain, palpitations, nausea, vomiting, diarrhea, or constipation. Pt  reports recently smoking marijuana and snorting cocaine. Pt reports past history of IV heroin use but denies any recent IV drug use. She states she has been on Methadone for the past 10 years.   Pt reports a she had an MRI done at [facility] which revealed she has herniated discs in the C-spine and lumbar region. She states her current pain is different compared to the back pain that she experiences.   In the ED,  · BP Systolic	114 mm Hg · BP Diastolic	82 mm Hg · Heart Rate	  118 /min · Respiration Rate (breaths/min)	18 /min · Temp (F)	97.9 Degrees F · Temp (C)	36.6 Degrees C · Temp site	oral · SpO2 (%)	99 % CT abdomen/pelvis: Inferior T7 and superior T8 endplate erosive changes with adjacent soft tissue prominence suspicious for an infectious discitis/osteomyelitis osteomyelitis.T10 superior endplate Schmorl's node. This has a benign appearance  Infectious Diseases History: Old Micro Data/Cultures:   FAMILY HISTORY:  PAST MEDICAL & SURGICAL HISTORY:   SOCIAL HISTORY Social History: Active smoker, 25 pack yr smoking hx On Methadone for opioid abuse Denies etoh use (13 Oct 2022 13:55)   Recent Travel: Other Exposures:   ROS General: Denies rigors, nightsweats HEENT: Denies headache, rhinorrhea, sore throat, eye pain CV: Denies CP, palpitations PULM: Denies wheezing, hemoptysis GI: Denies hematemesis, hematochezia, melena : Denies discharge, hematuria MSK: Denies arthralgias, myalgias SKIN: Denies rash, lesions NEURO: Denies paresthesias, weakness PSYCH: Denies depression, anxiety  VITALS: T(F): 97.6, Max: 97.6 (10-13-22 @ 20:22) HR: 63 BP: 103/58 RR: 17Vital Signs Last 24 Hrs T(C): 36.4 (14 Oct 2022 07:08), Max: 36.4 (13 Oct 2022 20:22) T(F): 97.6 (14 Oct 2022 07:08), Max: 97.6 (13 Oct 2022 20:22) HR: 63 (14 Oct 2022 07:08) (63 - 69) BP: 103/58 (14 Oct 2022 07:08) (97/65 - 114/71) BP(mean): -- RR: 17 (14 Oct 2022 07:08) (17 - 18) SpO2: 98% (13 Oct 2022 22:25) (97% - 98%)  Parameters below as of 13 Oct 2022 20:22 Patient On (Oxygen Delivery Method): room air    PHYSICAL EXAM: GENERAL: NAD, pt lying in bed HEAD:  Atraumatic, Normocephalic CHEST/LUNG: Clear to auscultation bilaterally; No wheeze; No crackles; No accessory muscles used HEART: Regular rate and rhythm; No murmurs;  ABDOMEN: Soft. Nondistended; Bowel sounds present; No guarding.  EXTREMITIES:  2+ Peripheral Pulses, No cyanosis or edema BACK: No tenderness to spine. Tenderness paraspinal region in thoracic region.  NEUROLOGY: non-focal. AAOx3. Negative straight leg raise, bilaterally. SKIN: No rashes or lesions  TESTS & MEASUREMENTS:                      11.6  6.23  )-----------( 333      ( 14 Oct 2022 07:53 )            37.1   10-13  135  |  95<L>  |  19 ----------------------------<  84 3.9   |  30  |  1.0  Ca    9.9      13 Oct 2022 02:29  TPro  8.7<H>  /  Alb  4.4  /  TBili  0.3  /  DBili  <0.2  /  AST  25  /  ALT  23  /  AlkPhos  116<H>  10-13   LIVER FUNCTIONS - ( 13 Oct 2022 02:29 ) Alb: 4.4 g/dL / Pro: 8.7 g/dL / ALK PHOS: 116 U/L / ALT: 23 U/L / AST: 25 U/L / GGT: x         Urinalysis Basic - ( 13 Oct 2022 01:20 )  Color: Yellow / Appearance: Clear / S.023 / pH: x Gluc: x / Ketone: Negative  / Bili: Negative / Urobili: <2 mg/dL  Blood: x / Protein: Trace / Nitrite: Negative  Leuk Esterase: Moderate / RBC: 4 /HPF / WBC 15 /HPF  Sq Epi: x / Non Sq Epi: 13 /HPF / Bacteria: Few    Culture - Urine (collected 10-13-22 @ 01:20) Source: Clean Catch Clean Catch (Midstream) Final Report (10-14-22 @ 07:39):   <10,000 CFU/mL Normal Urogenital Glenna    Blood Gas Venous - Lactate: 1.20 mmol/L (10-13-22 @ 02:52) Lactate, Blood: 1.1 mmol/L (10-13-22 @ 02:29)   INFECTIOUS DISEASES TESTING   RADIOLOGY & ADDITIONAL TESTS: I have personally reviewed the last available Chest xray CXR Xray Chest 1 View- PORTABLE-Urgent:  ACC: [id number] EXAM:  XR CHEST PORTABLE URGENT 1V                        PROCEDURE DATE:  10/13/2022      INTERPRETATION:  Clinical History / Reason for exam: Pain  Comparison : Chest radiograph None.  Technique/Positioning: Adequate.  Findings:  Support devices: None.  Cardiac/mediastinum/hilum: Unremarkable.  Lung parenchyma/Pleura: Within normal limits.  Skeleton/soft tissues: Unremarkable.  Impression:  No radiographic evidence of acute cardiopulmonary disease.  Refer to the report of the CT scan of the chest, abdomen and pelvis  performed on the same day.  --- End of Report ---      TAVO JENKINS MD; Attending Radiologist This document has been electronically signed. Oct 13 2022  6:47AM (10-13-22 @ 02:25)   CT CT Angio Chest PE Protocol w/ IV Cont:  ACC: [id number] EXAM:  CT ABDOMEN AND PELVIS IC                       ACC: [id number] EXAM:  CT ANGIO CHEST PULM ART [facility]                        *** ADDENDUM***  Dr. Lang Barahona discussed preliminary findings with DAVION LEE MD; on 10/13/2022 7:00 AM with readback.  --- End of Report ---  *** END OF ADDENDUM***   PROCEDURE DATE:  10/13/2022      INTERPRETATION:  CTA chest with IV contrast, CT abdomen and pelvis with  IV contrast  CLINICAL HISTORY/REASON FOR EXAM: Rightlower chest pain, elevated  d-dimer.  TECHNIQUE: CTA chest with IV contrast, CT abdomen and pelvis with IV  contrast performed. Initial CTA images of chest obtained with 100 cc  Omnipaque 350 IV contrast administration. Subsequently, CT images of  abdomen and pelvis obtained with IV contrast administration. Oral  contrast was not administered. Reformatted images in the coronal and  sagittal planes were acquired. 3D (MIP images) generated.  COMPARISON: None.   FINDINGS:  CHEST:  PULMONARY ARTERIES: No evidence of acute pulmonary embolism.  LUNGS, PLEURA, AIRWAYS: Trace bibasilar subsegmental atelectatic changes.  No pleural effusion. No pneumothorax. No evidence of central  endobronchial obstruction.  THORACIC NODES: No mediastinal, hilar, supraclavicular, or axillary  lymphadenopathy.  MEDIASTINUM/GREAT VESSELS: No pericardial effusion. Heart size is within  normal limits. The aorta and main pulmonary artery are of normal caliber.  ABDOMEN/PELVIS:  HEPATOBILIARY: Unremarkable.  SPLEEN: There are several splenic calcifications compatible with sequela  of prior infection  PANCREAS: Unremarkable.  ADRENAL GLANDS: Unremarkable.  KIDNEYS: Symmetric pattern of renal enhancement. No hydronephrosis  bilaterally.  ABDOMINOPELVIC NODES: No lymphadenopathy.  PELVIC ORGANS: Unremarkable.  PERITONEUM/MESENTERY/BOWEL: No bowel obstruction. No ascites or  pneumoperitoneum.  BONES/SOFT TISSUES: Inferior T7 and superior T8 endplate erosive changes  with adjacent soft tissue prominence suspicious for an infectious  discitis/osteomyelitis osteomyelitis.  T10 superior endplate Schmorl's node.  OTHER: Normal caliber abdominal aorta   IMPRESSION:   No evidence of acute pulmonary embolism.  Inferior T7 and superior T8 endplate erosive changes with adjacent soft  tissue prominence suspicious for an infectious discitis/osteomyelitis  osteomyelitis.  T10 superior endplate Schmorl's node. This has a benign appearance   --- End ofReport ---  ***Please see the addendum at the top of this report. It may contain  additional important information or changes.****   LANG BARAHONA MD; Resident Radiologist This document has been electronically signed. GIL LEON MD; Attending Radiologist This document has been electronically signed. Oct 13 2022  6:32AM Addend:GIL LEON MD; Attending Radiologist This addendum was electronically signed on: Oct 13 2022  7:05AM. (10-13-22 @ 05:33) CT Abdomen and Pelvis w/ IV Cont:  ACC: [id number] EXAM:  CT ABDOMEN AND PELVIS IC                       ACC: [id number] EXAM:  CT ANGIO CHEST PULM ART [facility]                        *** ADDENDUM***  Dr. Lagn Barahona discussed preliminary findings with DAVION LEE MD; on 10/13/2022 7:00 AM with readback.  --- End of Report ---  *** END OF ADDENDUM***   PROCEDURE DATE:  10/13/2022      INTERPRETATION:  CTA chest with IV contrast, CT abdomen and pelvis with  IV contrast  CLINICAL HISTORY/REASON FOR EXAM: Rightlower chest pain, elevated  d-dimer.  TECHNIQUE: CTA chest with IV contrast, CT abdomen and pelvis with IV  contrast performed. Initial CTA images of chest obtained with 100 cc  Omnipaque 350 IV contrast administration. Subsequently, CT images of  abdomen and pelvis obtained with IV contrast administration. Oral  contrast was not administered. Reformatted images in the coronal and  sagittal planes were acquired. 3D (MIP images) generated.  COMPARISON: None.   FINDINGS:  CHEST:  PULMONARY ARTERIES: No evidence of acute pulmonary embolism.  LUNGS, PLEURA, AIRWAYS: Trace bibasilar subsegmental atelectatic changes.  No pleural effusion. No pneumothorax. No evidence of central  endobronchial obstruction.  THORACIC NODES: No mediastinal, hilar, supraclavicular, or axillary  lymphadenopathy.  MEDIASTINUM/GREAT VESSELS: No pericardial effusion. Heart size is within  normal limits. The aorta and main pulmonary artery are of normal caliber.  ABDOMEN/PELVIS:  HEPATOBILIARY: Unremarkable.  SPLEEN: There are several splenic calcifications compatible with sequela  of prior infection  PANCREAS: Unremarkable.  ADRENAL GLANDS: Unremarkable.  KIDNEYS: Symmetric pattern of renal enhancement. No hydronephrosis  bilaterally.  ABDOMINOPELVIC NODES: No lymphadenopathy.  PELVIC ORGANS: Unremarkable.  PERITONEUM/MESENTERY/BOWEL: No bowel obstruction. No ascites or  pneumoperitoneum.  BONES/SOFT TISSUES: Inferior T7 and superior T8 endplate erosive changes  with adjacent soft tissue prominence suspicious for an infectious  discitis/osteomyelitis osteomyelitis.  T10 superior endplate Schmorl's node.  OTHER: Normal caliber abdominal aorta   IMPRESSION:   No evidence of acute pulmonary embolism.  Inferior T7 and superior T8 endplate erosive changes with adjacent soft  tissue prominence suspicious for an infectious discitis/osteomyelitis  osteomyelitis.  T10 superior endplate Schmorl's node. This has a benign appearance   --- End ofReport ---  ***Please see the addendum at the top of this report. It may contain  additional important information or changes.****   LANG BARAHONA MD; Resident Radiologist This document has been electronically signed. GIL LEON MD; Attending Radiologist This document has been electronically signed. Oct 13 2022  6:32AM Addend:GIL LEON MD; Attending Radiologist This addendum was electronically signed on: Oct 13 2022  7:05AM. (10-13-22 @ 05:33)   CARDIOLOGY TESTING 12 Lead ECG:  Ventricular Rate 61 BPM  Atrial Rate 61 BPM  P-R Interval 166 ms  QRS Duration 88 ms  Q-T Interval 498 ms  QTC Calculation(Bazett) 501 ms  P Axis 41 degrees  R Axis 42 degrees  T Axis 34 degrees  Diagnosis Line Normal sinus rhythm Prolonged QT Abnormal ECG  Confirmed by Enio Horta ([id number]) on 10/13/2022 3:29:40 PM (10-13-22 @ 13:05)   All available historical records have been reviewed  MEDICATIONS acetaminophen     Tablet .. 975 influenza   Vaccine 0.5 lactated ringers. 1000 pantoprazole    Tablet 40   ANTIBIOTICS:   All available historical data has been reviewed

## 2022-10-14 NOTE — PROGRESS NOTE ADULT - SUBJECTIVE AND OBJECTIVE BOX
SUBJECTIVE:    Patient is a 42y old Female who presents with a chief complaint of epigastric and mid back pain  Currently admitted to medicine with the primary diagnosis of: possible discitis vs osteomyelitis vs osteoporosis    Today is hospital day 1d.     Overnight Events:     No significant overnight events    SOCIAL HISTORY:  hx of IVDU heroin about 7 years ago  hx of opioid abuse  currently taking 190mg of methadone at clinic  active smoker, 25 pack year history    ALLERGIES:  No Known Allergies    MEDICATIONS:  STANDING MEDICATIONS  enoxaparin Injectable 40 milliGRAM(s) SubCutaneous every 24 hours  influenza   Vaccine 0.5 milliLiter(s) IntraMuscular once  methadone    Tablet 190 milliGRAM(s) Oral once  pantoprazole    Tablet 40 milliGRAM(s) Oral before breakfast    PRN MEDICATIONS  ALPRAZolam 2 milliGRAM(s) Oral three times a day PRN  oxyCODONE    IR 10 milliGRAM(s) Oral every 4 hours PRN    VITALS:   ICU Vital Signs Last 24 Hrs  T(C): 35.9 (14 Oct 2022 13:26), Max: 36.4 (13 Oct 2022 20:22)  T(F): 96.7 (14 Oct 2022 13:26), Max: 97.6 (13 Oct 2022 20:22)  HR: 97 (14 Oct 2022 13:26) (63 - 97)  BP: 103/56 (14 Oct 2022 13:26) (97/65 - 114/71)  RR: 18 (14 Oct 2022 13:26) (17 - 18)  SpO2: 98% (13 Oct 2022 22:25) (98% - 98%)    O2 Parameters below as of 13 Oct 2022 20:22  Patient On (Oxygen Delivery Method): room air      LABS:                        11.6   6.23  )-----------( 333      ( 14 Oct 2022 07:53 )             37.1     10-14    135  |  98  |  17  ----------------------------<  71  4.4   |  26  |  0.9    Ca    8.8      14 Oct 2022 07:53  Mg     2.0     10-    TPro  7.0  /  Alb  3.5  /  TBili  0.3  /  DBili  x   /  AST  18  /  ALT  19  /  AlkPhos  91  10-14      Urinalysis Basic - ( 13 Oct 2022 01:20 )    Color: Yellow / Appearance: Clear / S.023 / pH: x  Gluc: x / Ketone: Negative  / Bili: Negative / Urobili: <2 mg/dL   Blood: x / Protein: Trace / Nitrite: Negative   Leuk Esterase: Moderate / RBC: 4 /HPF / WBC 15 /HPF   Sq Epi: x / Non Sq Epi: 13 /HPF / Bacteria: Few      Culture - Urine (collected 13 Oct 2022 01:20)  Source: Clean Catch Clean Catch (Midstream)  Final Report (14 Oct 2022 07:39):    <10,000 CFU/mL Normal Urogenital Glenna      CARDIAC MARKERS ( 13 Oct 2022 02:29 )  x     / <0.01 ng/mL / x     / x     / x          RADIOLOGY:    < from: CT Abdomen and Pelvis w/ IV Cont (10.13.22 @ 05:33) >    ACC: [id number] EXAM:  CT ABDOMEN AND PELVIS IC                        ACC: [id number] EXAM:  CT ANGIO CHEST PULM ART WAWI                          *** ADDENDUM***    Dr. Lang Cabral discussed preliminary findings with DAVION LEE MD; on 10/13/2022 7:00 AM with readback.    --- End of Report ---    *** END OF ADDENDUM***      PROCEDURE DATE:  10/13/2022      INTERPRETATION:  CTA chest with IV contrast, CT abdomen and pelvis with   IV contrast    CLINICAL HISTORY/REASON FOR EXAM: Rightlower chest pain, elevated   d-dimer.    TECHNIQUE: CTA chest with IV contrast, CT abdomen and pelvis with IV   contrast performed. Initial CTA images of chest obtained with 100 cc   Omnipaque 350 IV contrast administration. Subsequently, CT images of   abdomen and pelvis obtained with IV contrast administration. Oral   contrast was not administered. Reformatted images in the coronal and   sagittal planes were acquired. 3D (MIP images) generated.    COMPARISON: None.      FINDINGS:    CHEST:    PULMONARY ARTERIES: No evidence of acute pulmonary embolism.    LUNGS, PLEURA, AIRWAYS: Trace bibasilar subsegmental atelectatic changes.   No pleural effusion. No pneumothorax. No evidence of central   endobronchial obstruction.    THORACIC NODES: No mediastinal, hilar, supraclavicular, or axillary   lymphadenopathy.    MEDIASTINUM/GREAT VESSELS: No pericardial effusion. Heart size is within   normal limits. The aorta and main pulmonary artery are of normal caliber.    ABDOMEN/PELVIS:    HEPATOBILIARY: Unremarkable.    SPLEEN: There are several splenic calcifications compatible with sequela   of prior infection    PANCREAS: Unremarkable.    ADRENAL GLANDS: Unremarkable.    KIDNEYS: Symmetric pattern of renal enhancement. No hydronephrosis   bilaterally.    ABDOMINOPELVIC NODES: No lymphadenopathy.    PELVIC ORGANS: Unremarkable.    PERITONEUM/MESENTERY/BOWEL: No bowel obstruction. No ascites or   pneumoperitoneum.    BONES/SOFT TISSUES: Inferior T7 and superior T8 endplate erosive changes   with adjacent soft tissue prominence suspicious for an infectious   discitis/osteomyelitis osteomyelitis.    T10 superior endplate Schmorl's node.    OTHER: Normal caliber abdominal aorta      IMPRESSION:      No evidence of acute pulmonary embolism.    Inferior T7 and superior T8 endplate erosive changes with adjacent soft   tissue prominence suspicious for an infectious discitis/osteomyelitis   osteomyelitis.    T10 superior endplate Schmorl's node. This has a benign appearance      --- End ofReport ---    ***Please see the addendum at the top of this report. It may contain   additional important information or changes.****      LANG CABRAL MD; Resident Radiologist  This document has been electronically signed.  GIL LEON MD; Attending Radiologist  This document has been electronically signed. Oct 13 2022  6:32AM  Addend:GIL LEON MD; Attending Radiologist  This addendum was electronically signed on: Oct 13 2022  7:05AM.    < end of copied text >      PHYSICAL EXAM:  GEN: in mild distress likely 2/2 anxiety  LUNGS: clear to ascultation b/l, no active wheezing or crackles, diffusely reduced breath sounds  HEART: normal rate and rhythm, no murmurs appreciated  ABD: soft, nondistended, inconsistent pain in the epigastric and R and L UQ upon palpation  EXT: no edema in the LEs b/l  NEURO: A&Ox3, anxious

## 2022-10-14 NOTE — PATIENT PROFILE ADULT - FALL HARM RISK - HARM RISK INTERVENTIONS

## 2022-10-14 NOTE — PATIENT PROFILE ADULT - FUNCTIONAL SCREEN CURRENT LEVEL: SWALLOWING (IF SCORE 2 OR MORE FOR ANY ITEM, CONSULT REHAB SERVICES), MLM)
0 = swallows foods/liquids without difficulty
Alert and oriented, no focal deficits, no motor or sensory deficits.
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
## 2022-10-14 NOTE — PATIENT PROFILE ADULT - FUNCTIONAL ASSESSMENT - BASIC MOBILITY 6.
3-calculated by average/Not able to assess (calculate score using [facility] averaging method)

## 2022-10-14 NOTE — CONSULT NOTE ADULT - ASSESSMENT
- Hold antibiotics  41 y/o F pt w/ PMH/o HTN, HLD, GERD, Opioid abuse, active smoker, hypothyroidism presents to the ED w/ RT sided epigastric pain radiating across to her mid-back for that began 2 weeks ago.    IMPRESSION  #Possible osteomyelitis  - WBC 9, Afebrile  - CT Abdomen/Pelvis: Inferior T7 and superior T8 endplate erosive changes with adjacent soft tissue prominence suspicious for an infectious discitis/osteomyelitis osteomyelitis.  - TTP to paraspinal in thoracic region on exam      RECOMMENDATIONS  - MRI, if positive for osteomyelitis and elevated inflammatory markers, biopsy with IR  - Repeat BCx  - Hold antibiotics  - ESR, CRP

## 2022-10-14 NOTE — CHART NOTE - NSCHARTNOTEFT_GEN_A_CORE
Called [facility] ([phone]), spoke to Patient's Counselor, Ms. Eric Stock ([phone]) and confirmed the dosage of methadone of 190mg daily. This call was placed at 1:50PM (10/14) and confirmed over the phone.
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
## 2022-10-14 NOTE — CHART NOTE - NSCHARTNOTEFT_GEN_A_CORE
Spoke to patient and patient's son at bedside. patient was very upset regarding medical management. Was upset that her methadone dosage was not verified in a timely manager to the patient's liking and was aggressive in regards to having to swallow 19 pills of methadone. Patient's methadone was switched to oral solution to the patient's liking. At the time of the encounter, while patient's son was beside, patient was slightly drowsy likely 2/2 taking alprazolam 2mg and percocet 10mg. There were concerns for the son potentially bringing in other opioids not prescribed to the patient. Concerns were discussed with attending. It was decided to proceed with giving the 190mg methadone solution and having intranasal narcan ordered PRN at the nursing station incase patient has respiratory depression or oversedation.    Patient also walked out of the unit multiple times and confronted by nursing staff, security and myself. Patient was verbally abusive and aggressive towards staff and myself. Patient told me to not enter her room otherwise she will leave AMA. Code grey was not called. Patient walked back into the unit. MAR was notified and is aware of the patient's aggressive nature while patient was in the ED. MAR spoke to the patient.

## 2022-10-14 NOTE — PROGRESS NOTE ADULT - SUBJECTIVE AND OBJECTIVE BOX
J LUISLISACANDE  42y  Female  ***My note supersedes ALL resident notes that I sign.  My corrections for their notes are in my note.***    I can be reached directly on [email or web address]. My office number is [phone]. My personal cell number is [phone].    INTERVAL EVENTS: Here for f/u of back pain. Pt has been sober from opiates/IVDA for 7 yrs. Methadone has been at a high dose for past 4 yrs. Pt recently did some cocaine (snort) and THC (smoked) w/ friends while watching football game, per SW. Pt says back pain is fairly constant and needs relief from it. Pt is NOT concerned about using opiates for pain. NSAIDs might be tricky as she is c/o gastritis and some abd pain, gibson towards right side. Pt says pain gets a less at it approaches the spine area and is worse as it wraps around to the lateral side and front ribs on the right. Pt reports recently falling/tripping back into a wall in her galley-kitchen at home and hit her back on the right side (not the center). Pt is able to ambulate. No sensory lvl. Pt has no bowel/bladder issues. Pt is hungry and able to eat.    T(F): 96.7 (10-14-22 @ 13:26), Max: 97.6 (10-13-22 @ 20:22)  HR: 97 (10-14-22 @ 13:26) (63 - 97)  BP: 103/56 (10-14-22 @ 13:26) (97/65 - 114/71)  RR: 18 (10-14-22 @ 13:26) (17 - 18)  SpO2: 98% (10-13-22 @ 22:25) (98% - 98%)    Gen: NAD  HEENT: PERRL, EOMI, mouth clr, nose clr  Neck: no nodes, no JVD, thyroid nl  lungs: clr  chest: pain more lateral right lower ribs and little more anterior than posterior; no deformity  hrt: s1 s2 rrr no murmur  abd: soft, NT/ND, no HS megaly  back: has pain around the T7/8 area in midline to palp; no deformity  ext: no edema, no c/c  neuro: aa ox3, cn intact, can move all 4 ext; gait fine; sensation intact    LABS:                     11.6    (    82.8   6.23  )-----------( ---------      333      ( 14 Oct 2022 07:53 )             37.1    (    14.2     135   (   98   (   71      10-14-22 @ 07:53  ----------------------               4.4   (   26   (   17                             -----                        0.9  Ca  8.8   Mg  2.0    P   --     LFT  7.0  (  0.3  (  18       10-14-22 @ 07:53  -------------------------  3.5  (  91  (  19    CARDIAC MARKERS ( 13 Oct 2022 02:29 )  x     / <0.01 ng/mL / x     / x     / x        Urinalysis Basic - ( 13 Oct 2022 01:20 )    Color: Yellow / Appearance: Clear / S.023 / pH: x  Gluc: x / Ketone: Negative  / Bili: Negative / Urobili: <2 mg/dL   Blood: x / Protein: Trace / Nitrite: Negative   Leuk Esterase: Moderate / RBC: 4 /HPF / WBC 15 /HPF   Sq Epi: x / Non Sq Epi: 13 /HPF / Bacteria: Few    Culture - Urine (collected 10-13-22 @ 01:20)  Source: Clean Catch Clean Catch (Midstream)  Final Report (10-14-22 @ 07:39):    <10,000 CFU/mL Normal Urogenital Glenna    RADIOLOGY & ADDITIONAL TESTS:  < from: CT Abdomen and Pelvis w/ IV Cont (10.13.22 @ 05:33) >  IMPRESSION:    No evidence of acute pulmonary embolism.    Inferior T7 and superior T8 endplate erosive changes with adjacent soft   tissue prominence suspicious for an infectious discitis/osteomyelitis   osteomyelitis.    T10 superior endplate Schmorl's node. This has a benign appearance    < end of copied text >    < from: Xray Chest 1 View- PORTABLE-Urgent (Xray Chest 1 View- PORTABLE-Urgent .) (10.13.22 @ 02:25) >  Impression:    No radiographic evidence of acute cardiopulmonary disease.    < end of copied text >    < from: US Abdomen Upper Quadrant Right (10.13.22 @ 01:38) >  IMPRESSION:  Normal right upper quadrant abdominal ultrasound.    < end of copied text >    MEDICATIONS:    acetaminophen     Tablet .. 975 milliGRAM(s) Oral every 6 hours  ALPRAZolam 2 milliGRAM(s) Oral three times a day PRN  enoxaparin Injectable 40 milliGRAM(s) SubCutaneous every 24 hours  influenza   Vaccine 0.5 milliLiter(s) IntraMuscular once  lactated ringers. 1000 milliLiter(s) IV Continuous <Continuous>  methadone    Tablet 190 milliGRAM(s) Oral once  oxyCODONE    IR 10 milliGRAM(s) Oral every 4 hours PRN  pantoprazole    Tablet 40 milliGRAM(s) Oral before breakfast

## 2022-10-14 NOTE — CONSULT NOTE ADULT - SUBJECTIVE AND OBJECTIVE BOX
HPI:  41 y/o F pt w/ PMH/o HTN, HLD, GERD, Opioid abuse, active smoker, hypothyroidism presents to the ED w/ RT sided epigastric pain radiating across to her mid-back for the past several days. Pt reports the pain is sharp, intermittent, moderate. Pt reports the pain persists and denies eliciting factors (eating, physical exertion). Pt denies f/c/b, n/v, trauma. Pt reports no hx/o similar pain in the past.    Neurosurgery was consulted after CT findings demonstrated erosive changes of T7/T8 vertebra. Patient was seen and examined at bedside on 3B. She is lying in bed A&Ox3 and following all commands. She claims that about two weeks ago she began having abdominal pain that she presumed was her ulcer, however, the pain began worsening progressively and began radiating from her mid back. Patient describes pain as sharp as if she is getting stabbed. Patient claims pain is better when she is bent over. Patient denies radiation of pain down her lower extremities, numbness, tingling, weakness, urinary or fecal incontinence or retention saddle anesthesia, headaches. Pt also denies recent IV drug use. Claims she is on Methadone and only had a few bumps of cocaine and smoked marijuana recently.      PAST MEDICAL & SURGICAL HISTORY:      Home Medications:      Allergies    No Known Allergies    Intolerances        ROS:  [X] A ten-point review of systems is negative except as noted   [  ] Due to altered mental status/intubation, subjective information were not able to be obtained from the patient. History was obtained, to the extent possible, from review of the chart and collateral sources of information    MEDICATIONS  (STANDING):  acetaminophen     Tablet .. 975 milliGRAM(s) Oral every 6 hours  influenza   Vaccine 0.5 milliLiter(s) IntraMuscular once  lactated ringers. 1000 milliLiter(s) (75 mL/Hr) IV Continuous <Continuous>  pantoprazole    Tablet 40 milliGRAM(s) Oral before breakfast    MEDICATIONS  (PRN):  LORazepam     Tablet 2 milliGRAM(s) Oral two times a day PRN Anxiety  oxyCODONE    IR 5 milliGRAM(s) Oral every 4 hours PRN Severe Pain (7 - 10)      ICU Vital Signs Last 24 Hrs  T(C): 36.4 (14 Oct 2022 07:08), Max: 36.4 (13 Oct 2022 20:22)  T(F): 97.6 (14 Oct 2022 07:08), Max: 97.6 (13 Oct 2022 20:22)  HR: 63 (14 Oct 2022 07:08) (63 - 69)  BP: 103/58 (14 Oct 2022 07:08) (97/65 - 114/71)  BP(mean): --  ABP: --  ABP(mean): --  RR: 17 (14 Oct 2022 07:08) (17 - 18)  SpO2: 98% (13 Oct 2022 22:25) (97% - 98%)    O2 Parameters below as of 13 Oct 2022 20:22  Patient On (Oxygen Delivery Method): room air            I&O's Detail      CBC Full  -  ( 14 Oct 2022 07:53 )  WBC Count : 6.23 K/uL  RBC Count : 4.48 M/uL  Hemoglobin : 11.6 g/dL  Hematocrit : 37.1 %  Platelet Count - Automated : 333 K/uL  Mean Cell Volume : 82.8 fL  Mean Cell Hemoglobin : 25.9 pg  Mean Cell Hemoglobin Concentration : 31.3 g/dL  Auto Neutrophil # : 2.15 K/uL  Auto Lymphocyte # : 2.87 K/uL  Auto Monocyte # : 0.75 K/uL  Auto Eosinophil # : 0.40 K/uL  Auto Basophil # : 0.04 K/uL  Auto Neutrophil % : 34.6 %  Auto Lymphocyte % : 46.1 %  Auto Monocyte % : 12.0 %  Auto Eosinophil % : 6.4 %  Auto Basophil % : 0.6 %    1014    135  |  98  |  17  ----------------------------<  71  4.4   |  26  |  0.9    Ca    8.8      14 Oct 2022 07:53  Mg     2.0     10-14    TPro  7.0  /  Alb  3.5  /  TBili  0.3  /  DBili  x   /  AST  18  /  ALT  19  /  AlkPhos  91  10-14    CARDIAC MARKERS ( 13 Oct 2022 02:29 )  x     / <0.01 ng/mL / x     / x     / x          Urinalysis Basic - ( 13 Oct 2022 01:20 )    Color: Yellow / Appearance: Clear / S.023 / pH: x  Gluc: x / Ketone: Negative  / Bili: Negative / Urobili: <2 mg/dL   Blood: x / Protein: Trace / Nitrite: Negative   Leuk Esterase: Moderate / RBC: 4 /HPF / WBC 15 /HPF   Sq Epi: x / Non Sq Epi: 13 /HPF / Bacteria: Few        Physical Exam:  General: Lying in bed, following all commands  AAOX3. Verbal function intact  Facial motions symmetric  EOMI  Motor: MAEx4  5/5 strength in b/l UE's and LE's  Sensation: intact to touch in all extremities  Back mildly tender to palpation of midline/right paraspinal muscles      Imaging:  < from: CT Abdomen and Pelvis w/ IV Cont (10.13.22 @ 05:33) >  IMPRESSION:      No evidence of acute pulmonary embolism.    Inferior T7 and superior T8 endplate erosive changes with adjacent soft   tissue prominence suspicious for an infectious discitis/osteomyelitis   osteomyelitis.    T10 superior endplate Schmorl's node. This has a benign appearance      Assessment/Plan:  42 year-old female Hx opioid abuse p/w right sided abdominal/flank pain with radiation from back. CT demonstrating inferior T7 and superior T8 endplate erosive changes suspicious for osteo/discitis.   -Pain management  -MRI T&L spine +/- contrast if no contraindications  -IR consult for biopsy  -Labs: ESR, CRP, blood cultures  -ID consult  -PT/OT  -D/w attending

## 2022-10-14 NOTE — PROGRESS NOTE ADULT - ASSESSMENT
41 y/o F pt w/ PMH/o HTN, HLD, GERD, polysubstance abuse (see below), active smoker, hypothyroidism presents to the ED w/ RT sided epigastric pain radiating across to her mid-back for the past several days    PMD: Dr JUAN CARLOS Vásquez [phone] ([facility]); [phone]    # Rt lateral rib pain is prob referred pain from Inferior T7 and superior T8 endplate erosive changes susp for diskitis/OM at those lvls  pt reports no IVDA for 7 yrs  not obvious risk factor for spinal infection  no fever, no WBC; does NOT clinically look sick  CT Abdomen/pelvis noncon: Inferior T7 and superior T8 endplate erosive changes with adjacent soft tissue prominence susp for an infectious discitis/osteomyelitis   RUQ U/S: nl  ID noted: no abx  NSx noted: MRI T/L Sp w/wo gado  might need neuro IR bx after MRIs  check ESR/CRP  f/u bld cx x2 and rpt bld cx x2  pain: oxy ir 10mg po q4 prn (try to avoid iv meds)  d/c tylenol so as to not mask fever    # an alternative dx to keep in mind, IF OM/infection rules out: opiate-induced osteoporosis  need outpt dexa scan  outpt rheum eval if confirmed  TSH 2.97  Ca nl, but on low side  check Vit D 25 OH    # polysubstance use  hx IVDA (heroine): sober 7 yrs  benzo use: Rx'd by PMD  THC: occ use, smokes still  Cocaine: snort; rare use still   this may all pose a problem if IV abx are needed in terms of d/c    # Chronic Opioid use on Methadone therapy  [facility], [place], operated by [facility] [phone]  190mg PO daily confirmed     # Epigastric Pain; Hx/o GERD  makes NSAID use difficult  never underwent EGD  No significant intra-abdominal findings on CT scans or RUQ US  Lipase 14  c/w PPI  ok to feed; d/c IVFs    # Hx/o HTN  not on meds    # HLD  F/u lipid panel    # hx Hypothyroidism - Not taking levothyroxine  TSH nl - cont off synthroid    # Anxiety  Xanax 2mg po q8 prn - confirmed w/ pharmacy    # DVT ppx: LMWH    # Activity: independent    # Code: Full    # Dispo: MRI T/L w/wo gado; hold abx; ESR/CRP; f/u bld cx; Vit D 25 OH; lipid panel; tx pain; f/u NSx/ID; might need neuro IR bx  eventually, pt will go home - not ready for d/c

## 2022-10-15 DIAGNOSIS — M54.6 PAIN IN THORACIC SPINE: ICD-10-CM

## 2022-10-15 LAB
24R-OH-CALCIDIOL SERPL-MCNC: 17 NG/ML — LOW (ref 30–80)
ALBUMIN SERPL ELPH-MCNC: 4 G/DL — SIGNIFICANT CHANGE UP (ref 3.5–5.2)
ALP SERPL-CCNC: 114 U/L — SIGNIFICANT CHANGE UP (ref 30–115)
ALT FLD-CCNC: 22 U/L — SIGNIFICANT CHANGE UP (ref 0–41)
ANION GAP SERPL CALC-SCNC: 13 MMOL/L — SIGNIFICANT CHANGE UP (ref 7–14)
AST SERPL-CCNC: 24 U/L — SIGNIFICANT CHANGE UP (ref 0–41)
BASOPHILS # BLD AUTO: 0.05 K/UL — SIGNIFICANT CHANGE UP (ref 0–0.2)
BASOPHILS NFR BLD AUTO: 0.7 % — SIGNIFICANT CHANGE UP (ref 0–1)
BILIRUB SERPL-MCNC: <0.2 MG/DL — SIGNIFICANT CHANGE UP (ref 0.2–1.2)
BUN SERPL-MCNC: 15 MG/DL — SIGNIFICANT CHANGE UP (ref 10–20)
CALCIUM SERPL-MCNC: 9.3 MG/DL — SIGNIFICANT CHANGE UP (ref 8.4–10.5)
CHLORIDE SERPL-SCNC: 101 MMOL/L — SIGNIFICANT CHANGE UP (ref 98–110)
CO2 SERPL-SCNC: 27 MMOL/L — SIGNIFICANT CHANGE UP (ref 17–32)
CREAT SERPL-MCNC: 0.9 MG/DL — SIGNIFICANT CHANGE UP (ref 0.7–1.5)
CRP SERPL-MCNC: 10.3 MG/L — HIGH
EGFR: 82 ML/MIN/1.73M2 — SIGNIFICANT CHANGE UP
EOSINOPHIL # BLD AUTO: 0.44 K/UL — SIGNIFICANT CHANGE UP (ref 0–0.7)
EOSINOPHIL NFR BLD AUTO: 5.7 % — SIGNIFICANT CHANGE UP (ref 0–8)
ERYTHROCYTE [SEDIMENTATION RATE] IN BLOOD: 74 MM/HR — HIGH (ref 0–20)
GLUCOSE SERPL-MCNC: 71 MG/DL — SIGNIFICANT CHANGE UP (ref 70–99)
HCT VFR BLD CALC: 39.6 % — SIGNIFICANT CHANGE UP (ref 37–47)
HGB BLD-MCNC: 12.6 G/DL — SIGNIFICANT CHANGE UP (ref 12–16)
IMM GRANULOCYTES NFR BLD AUTO: 0.7 % — HIGH (ref 0.1–0.3)
LYMPHOCYTES # BLD AUTO: 3.18 K/UL — SIGNIFICANT CHANGE UP (ref 1.2–3.4)
LYMPHOCYTES # BLD AUTO: 41.4 % — SIGNIFICANT CHANGE UP (ref 20.5–51.1)
MAGNESIUM SERPL-MCNC: 1.9 MG/DL — SIGNIFICANT CHANGE UP (ref 1.8–2.4)
MCHC RBC-ENTMCNC: 26.1 PG — LOW (ref 27–31)
MCHC RBC-ENTMCNC: 31.8 G/DL — LOW (ref 32–37)
MCV RBC AUTO: 82.2 FL — SIGNIFICANT CHANGE UP (ref 81–99)
MONOCYTES # BLD AUTO: 0.87 K/UL — HIGH (ref 0.1–0.6)
MONOCYTES NFR BLD AUTO: 11.3 % — HIGH (ref 1.7–9.3)
NEUTROPHILS # BLD AUTO: 3.1 K/UL — SIGNIFICANT CHANGE UP (ref 1.4–6.5)
NEUTROPHILS NFR BLD AUTO: 40.2 % — LOW (ref 42.2–75.2)
NRBC # BLD: 0 /100 WBCS — SIGNIFICANT CHANGE UP (ref 0–0)
PLATELET # BLD AUTO: 387 K/UL — SIGNIFICANT CHANGE UP (ref 130–400)
POTASSIUM SERPL-MCNC: 4.5 MMOL/L — SIGNIFICANT CHANGE UP (ref 3.5–5)
POTASSIUM SERPL-SCNC: 4.5 MMOL/L — SIGNIFICANT CHANGE UP (ref 3.5–5)
PROT SERPL-MCNC: 8.1 G/DL — HIGH (ref 6–8)
RBC # BLD: 4.82 M/UL — SIGNIFICANT CHANGE UP (ref 4.2–5.4)
RBC # FLD: 13.8 % — SIGNIFICANT CHANGE UP (ref 11.5–14.5)
SODIUM SERPL-SCNC: 141 MMOL/L — SIGNIFICANT CHANGE UP (ref 135–146)
WBC # BLD: 7.69 K/UL — SIGNIFICANT CHANGE UP (ref 4.8–10.8)
WBC # FLD AUTO: 7.69 K/UL — SIGNIFICANT CHANGE UP (ref 4.8–10.8)

## 2022-10-15 PROCEDURE — 99233 SBSQ HOSP IP/OBS HIGH 50: CPT

## 2022-10-15 RX ADMIN — Medication 2 MILLIGRAM(S): at 17:13

## 2022-10-15 RX ADMIN — PANTOPRAZOLE SODIUM 40 MILLIGRAM(S): 20 TABLET, DELAYED RELEASE ORAL at 06:08

## 2022-10-15 RX ADMIN — OXYCODONE HYDROCHLORIDE 10 MILLIGRAM(S): 5 TABLET ORAL at 21:03

## 2022-10-15 RX ADMIN — OXYCODONE HYDROCHLORIDE 10 MILLIGRAM(S): 5 TABLET ORAL at 21:57

## 2022-10-15 RX ADMIN — OXYCODONE HYDROCHLORIDE 10 MILLIGRAM(S): 5 TABLET ORAL at 08:17

## 2022-10-15 RX ADMIN — Medication 2 MILLIGRAM(S): at 21:02

## 2022-10-15 RX ADMIN — OXYCODONE HYDROCHLORIDE 10 MILLIGRAM(S): 5 TABLET ORAL at 16:47

## 2022-10-15 RX ADMIN — OXYCODONE HYDROCHLORIDE 10 MILLIGRAM(S): 5 TABLET ORAL at 10:00

## 2022-10-15 RX ADMIN — OXYCODONE HYDROCHLORIDE 10 MILLIGRAM(S): 5 TABLET ORAL at 15:21

## 2022-10-15 NOTE — CHART NOTE - NSCHARTNOTEFT_GEN_A_CORE
Case and imaging discussed with attending Dr. Black.     - No acute epidural component seen on MRI  - No acute neurosurgical intervention indicated at this time   - Recommend TLSO brace for comfort  - Ok for OOB w PT from a neurosurgical standpoint   - F/u outpatient in office w Dr. Black 2w after discharge   - Continue w/u with ID and medicine
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
## 2022-10-15 NOTE — CONSULT NOTE ADULT - SUBJECTIVE AND OBJECTIVE BOX
PAIN MANAGEMENT CONSULT NOTE    Chief Complaint:    HPI:  43 y/o F pt w/ PMH/o HTN, HLD, GERD, Opioid abuse, active smoker, hypothyroidism presents to the ED w/ RT sided epigastric pain radiating across to her mid-back for the past several days. Pt reports the pain is sharp, intermittent, moderate. Pt reports the pain persists and denies eliciting factors (eating, physical exertion). Pt denies f/c/b, n/v, trauma. Pt reports no hx/o similar pain in the past.    In the ED,   · BP Systolic	114 mm Hg  · BP Diastolic	82 mm Hg  · Heart Rate	  118 /min  · Respiration Rate (breaths/min)	18 /min  · Temp (F)	97.9 Degrees F  · Temp (C)	36.6 Degrees C  · Temp site	oral  · SpO2 (%)	99 %  CT abdomen/pelvis: Inferior T7 and superior T8 endplate erosive changes with adjacent soft tissue prominence suspicious for an infectious discitis/osteomyelitis osteomyelitis.T10 superior endplate Schmorl's node. This has a benign appearance (13 Oct 2022 13:55)      PAST MEDICAL & SURGICAL HISTORY:      FAMILY HISTORY:      SOCIAL HISTORY:  [ ] Denies Smoking, Alcohol, or Drug Use    HOME MEDICATIONS:   Please refer to initial HNP    PAIN HOME MEDICATIONS:    Allergies    No Known Allergies    Intolerances        PAIN MEDICATIONS:  ALPRAZolam 2 milliGRAM(s) Oral three times a day PRN  oxyCODONE    IR 10 milliGRAM(s) Oral every 4 hours PRN    Heme:  enoxaparin Injectable 40 milliGRAM(s) SubCutaneous every 24 hours    Antibiotics:    Cardiovascular:    GI:  pantoprazole    Tablet 40 milliGRAM(s) Oral before breakfast    Endocrine:    All Other Medications:  influenza   Vaccine 0.5 milliLiter(s) IntraMuscular once  naloxone 1 mG/mL Injection for Intranasal Use 4 milliGRAM(s) IntraNasal once PRN      Vital Signs Last 24 Hrs  T(C): 36.2 (15 Oct 2022 13:46), Max: 36.8 (15 Oct 2022 05:15)  T(F): 97.1 (15 Oct 2022 13:46), Max: 98.3 (15 Oct 2022 05:15)  HR: 64 (15 Oct 2022 13:46) (64 - 71)  BP: 106/68 (15 Oct 2022 13:46) (95/57 - 127/62)  BP(mean): --  RR: 18 (15 Oct 2022 13:46) (18 - 18)  SpO2: --        LABS:                        12.6   7.69  )-----------( 387      ( 15 Oct 2022 04:30 )             39.6     10-15    141  |  101  |  15  ----------------------------<  71  4.5   |  27  |  0.9    Ca    9.3      15 Oct 2022 04:30  Mg     1.9     10-15    TPro  8.1<H>  /  Alb  4.0  /  TBili  <0.2  /  DBili  x   /  AST  24  /  ALT  22  /  AlkPhos  114  10-15    Radiology: CT A&P    Inferior T7 and superior T8 endplate erosive changes with adjacent soft   tissue prominence suspicious for an infectious discitis/osteomyelitis   osteomyelitis.    T10 superior endplate Schmorl's node. This has a benign appearance      REVIEW OF SYSTEMS:  CONSTITUTIONAL: No fever or fatigue O/N.   EYES: No eye pain, visual disturbances  ENMT:  No difficulty hearing. No throat pain  NECK: No pain or stiffness  RESPIRATORY: No cough, wheezing; No shortness of breath  CARDIOVASCULAR: No chest pain, palpitations.   GASTROINTESTINAL: No abdominal or epigastric pain. No nausea, vomiting.   GENITOURINARY: No dysuria, frequency, or incontinence  NEUROLOGICAL: No headaches, loss of strength, numbness, or tremors. No dizzinesss or lightheadedness with pain medications.   MUSCULOSKELETAL: + back pain. No joint pain or swelling; No muscle, or extremity pain      PAIN ASSESSMENT:    PHYSICAL EXAM  GENERAL: Laying in bed, NAD  Cranial nerves grossly intact  Motor exam: 5/5 b/l UE & LE  Sensation intact to LT in UE/LE in 3 dermatomes  CHEST/LUNG: Clear to auscultation bilaterally  HEART: Regular rate and rhythm; No murmurs, rubs, or gallops  ABDOMEN: Soft, Nontender, Nondistended; Bowel sounds present  EXTREMITIES:  2+ Peripheral Pulses, No clubbing, cyanosis, or edema  SKIN: No rashes or lesions      Patient having acute pain in the mid back 2/2 to a possible discitis seen on MRI  h/o of drug abuse on methadone 190mg/day    PLAN:   tylenol 975mg q8h standing  ibuprofen 600mg q6h PRN  can use oxycodone [id number] PRN for severe pain  + bowel regimen  f/u MRI results - if imaging is supportive of possible infectious cause - would recommend IR guided biopsy   f/u primary team

## 2022-10-15 NOTE — PROGRESS NOTE ADULT - ATTENDING COMMENTS
Patient seen and examined. Patient continues to have pain, controlled with medications.   Pending results of MRI, if discitis/OM will need IR for bone biopsy and culture. Hold off abx for now. Blood cultures negative to date. ESR/CRP elevated.   Rest of plan as above.       Pending: MRI results, possibly IR eval Monday   Plan of care d/w patient   Dispo: Home

## 2022-10-15 NOTE — PROGRESS NOTE ADULT - SUBJECTIVE AND OBJECTIVE BOX
SUBJECTIVE:    Patient is a 42y old Female who presents with a chief complaint of Epigastric and Midback Pain (14 Oct 2022 17:37)    Currently admitted to medicine with the primary diagnosis of:    Today is hospital day 2d.     Overnight Events:     no significant overnight events besides aggression towards staff and suspicions for taking outside drugs (see chart note)    SOCIAL HISTORY:  hx of IVDU heroin about 7 years ago  hx of opioid abuse  currently taking 190mg of methadone at clinic  active smoker, 25 pack year history    ALLERGIES:  No Known Allergies    MEDICATIONS:  STANDING MEDICATIONS  enoxaparin Injectable 40 milliGRAM(s) SubCutaneous every 24 hours  influenza   Vaccine 0.5 milliLiter(s) IntraMuscular once  pantoprazole    Tablet 40 milliGRAM(s) Oral before breakfast    PRN MEDICATIONS  ALBUTerol    90 MICROgram(s) HFA Inhaler 2 Puff(s) Inhalation every 6 hours PRN  ALPRAZolam 2 milliGRAM(s) Oral three times a day PRN  naloxone 1 mG/mL Injection for Intranasal Use 4 milliGRAM(s) IntraNasal once PRN  oxyCODONE    IR 10 milliGRAM(s) Oral every 4 hours PRN    VITALS:   ICU Vital Signs Last 24 Hrs  T(C): 36.8 (15 Oct 2022 05:15), Max: 36.8 (15 Oct 2022 05:15)  T(F): 98.3 (15 Oct 2022 05:15), Max: 98.3 (15 Oct 2022 05:15)  HR: 71 (15 Oct 2022 05:15) (68 - 97)  BP: 95/57 (15 Oct 2022 05:15) (95/57 - 127/62)  RR: 18 (15 Oct 2022 05:15) (18 - 18)    LABS:                        12.6   7.69  )-----------( 387      ( 15 Oct 2022 04:30 )             39.6     10-14    135  |  98  |  17  ----------------------------<  71  4.4   |  26  |  0.9    Ca    8.8      14 Oct 2022 07:53  Mg     2.0     10-14    TPro  7.0  /  Alb  3.5  /  TBili  0.3  /  DBili  x   /  AST  18  /  ALT  19  /  AlkPhos  91  10-14    Culture - Blood (collected 13 Oct 2022 09:02)  Source: .Blood Blood  Preliminary Report (14 Oct 2022 18:02):    No growth to date.    Culture - Blood (collected 13 Oct 2022 09:02)  Source: .Blood Blood  Preliminary Report (14 Oct 2022 18:02):    No growth to date.    Culture - Urine (collected 13 Oct 2022 01:20)  Source: Clean Catch Clean Catch (Midstream)  Final Report (14 Oct 2022 07:39):    <10,000 CFU/mL Normal Urogenital Glenna    RADIOLOGY:    < from: CT Abdomen and Pelvis w/ IV Cont (10.13.22 @ 05:33) >    ACC: [id number] EXAM:  CT ABDOMEN AND PELVIS IC                        ACC: [id number] EXAM:  CT ANGIO CHEST PULM ART WAWIC                          *** ADDENDUM***    Dr. Lang Cabral discussed preliminary findings with DAVION LEE MD; on 10/13/2022 7:00 AM with readback.    --- End of Report ---    *** END OF ADDENDUM***      PROCEDURE DATE:  10/13/2022          INTERPRETATION:  CTA chest with IV contrast, CT abdomen and pelvis with   IV contrast    CLINICAL HISTORY/REASON FOR EXAM: Rightlower chest pain, elevated   d-dimer.    TECHNIQUE: CTA chest with IV contrast, CT abdomen and pelvis with IV   contrast performed. Initial CTA images of chest obtained with 100 cc   Omnipaque 350 IV contrast administration. Subsequently, CT images of   abdomen and pelvis obtained with IV contrast administration. Oral   contrast was not administered. Reformatted images in the coronal and   sagittal planes were acquired. 3D (MIP images) generated.    COMPARISON: None.      FINDINGS:    CHEST:    PULMONARY ARTERIES: No evidence of acute pulmonary embolism.    LUNGS, PLEURA, AIRWAYS: Trace bibasilar subsegmental atelectatic changes.   No pleural effusion. No pneumothorax. No evidence of central   endobronchial obstruction.    THORACIC NODES: No mediastinal, hilar, supraclavicular, or axillary   lymphadenopathy.    MEDIASTINUM/GREAT VESSELS: No pericardial effusion. Heart size is within   normal limits. The aorta and main pulmonary artery are of normal caliber.    ABDOMEN/PELVIS:    HEPATOBILIARY: Unremarkable.    SPLEEN: There are several splenic calcifications compatible with sequela   of prior infection    PANCREAS: Unremarkable.    ADRENAL GLANDS: Unremarkable.    KIDNEYS: Symmetric pattern of renal enhancement. No hydronephrosis   bilaterally.    ABDOMINOPELVIC NODES: No lymphadenopathy.    PELVIC ORGANS: Unremarkable.    PERITONEUM/MESENTERY/BOWEL: No bowel obstruction. No ascites or   pneumoperitoneum.    BONES/SOFT TISSUES: Inferior T7 and superior T8 endplate erosive changes   with adjacent soft tissue prominence suspicious for an infectious   discitis/osteomyelitis osteomyelitis.    T10 superior endplate Schmorl's node.    OTHER: Normal caliber abdominal aorta      IMPRESSION:      No evidence of acute pulmonary embolism.    Inferior T7 and superior T8 endplate erosive changes with adjacent soft   tissue prominence suspicious for an infectious discitis/osteomyelitis   osteomyelitis.    T10 superior endplate Schmorl's node. This has a benign appearance      --- End ofReport ---    ***Please see the addendum at the top of this report. It may contain   additional important information or changes.****      LANG CABRAL MD; Resident Radiologist  This document has been electronically signed.  GIL LEON MD; Attending Radiologist  This document has been electronically signed. Oct 13 2022  6:32AM  Addend:GIL LEON MD; Attending Radiologist  This addendum was electronically signed on: Oct 13 2022  7:05AM.    < end of copied text >      PHYSICAL EXAM:  GEN: in NAD  LUNGS: no visible respiratory distress or labored breathing, no audible wheezing  HEART: did not obtain (performed by attending)  ABD: did not obtain (performed by attending)  EXT: did not obtain (performed by attending)  NEURO: A&Ox3  - note: the rest of the physical exam performed by attending physician, patient refused the let me exam her       SUBJECTIVE:    Patient is a 42y old Female who presents with a chief complaint of Epigastric and Midback Pain (14 Oct 2022 17:37)    Currently admitted to medicine with the primary diagnosis of: possible discitis/OM/osteoporosis    Today is hospital day 2d.     Overnight Events:     no significant overnight events besides aggression towards staff and suspicions for taking outside drugs (see chart note)    SOCIAL HISTORY:  hx of IVDU heroin about 7 years ago  hx of opioid abuse  currently taking 190mg of methadone at clinic  active smoker, 25 pack year history    ALLERGIES:  No Known Allergies    MEDICATIONS:  STANDING MEDICATIONS  enoxaparin Injectable 40 milliGRAM(s) SubCutaneous every 24 hours  influenza   Vaccine 0.5 milliLiter(s) IntraMuscular once  pantoprazole    Tablet 40 milliGRAM(s) Oral before breakfast    PRN MEDICATIONS  ALBUTerol    90 MICROgram(s) HFA Inhaler 2 Puff(s) Inhalation every 6 hours PRN  ALPRAZolam 2 milliGRAM(s) Oral three times a day PRN  naloxone 1 mG/mL Injection for Intranasal Use 4 milliGRAM(s) IntraNasal once PRN  oxyCODONE    IR 10 milliGRAM(s) Oral every 4 hours PRN    VITALS:   ICU Vital Signs Last 24 Hrs  T(C): 36.8 (15 Oct 2022 05:15), Max: 36.8 (15 Oct 2022 05:15)  T(F): 98.3 (15 Oct 2022 05:15), Max: 98.3 (15 Oct 2022 05:15)  HR: 71 (15 Oct 2022 05:15) (68 - 97)  BP: 95/57 (15 Oct 2022 05:15) (95/57 - 127/62)  RR: 18 (15 Oct 2022 05:15) (18 - 18)    LABS:                        12.6   7.69  )-----------( 387      ( 15 Oct 2022 04:30 )             39.6     10-14    135  |  98  |  17  ----------------------------<  71  4.4   |  26  |  0.9    Ca    8.8      14 Oct 2022 07:53  Mg     2.0     10-14    TPro  7.0  /  Alb  3.5  /  TBili  0.3  /  DBili  x   /  AST  18  /  ALT  19  /  AlkPhos  91  10-14    Culture - Blood (collected 13 Oct 2022 09:02)  Source: .Blood Blood  Preliminary Report (14 Oct 2022 18:02):    No growth to date.    Culture - Blood (collected 13 Oct 2022 09:02)  Source: .Blood Blood  Preliminary Report (14 Oct 2022 18:02):    No growth to date.    Culture - Urine (collected 13 Oct 2022 01:20)  Source: Clean Catch Clean Catch (Midstream)  Final Report (14 Oct 2022 07:39):    <10,000 CFU/mL Normal Urogenital Glenna    RADIOLOGY:    < from: CT Abdomen and Pelvis w/ IV Cont (10.13.22 @ 05:33) >    ACC: [id number] EXAM:  CT ABDOMEN AND PELVIS IC                        ACC: [id number] EXAM:  CT ANGIO CHEST PULM ART WAWIC                          *** ADDENDUM***    Dr. Lang Cabral discussed preliminary findings with DAVION LEE MD; on 10/13/2022 7:00 AM with readback.    --- End of Report ---    *** END OF ADDENDUM***      PROCEDURE DATE:  10/13/2022          INTERPRETATION:  CTA chest with IV contrast, CT abdomen and pelvis with   IV contrast    CLINICAL HISTORY/REASON FOR EXAM: Rightlower chest pain, elevated   d-dimer.    TECHNIQUE: CTA chest with IV contrast, CT abdomen and pelvis with IV   contrast performed. Initial CTA images of chest obtained with 100 cc   Omnipaque 350 IV contrast administration. Subsequently, CT images of   abdomen and pelvis obtained with IV contrast administration. Oral   contrast was not administered. Reformatted images in the coronal and   sagittal planes were acquired. 3D (MIP images) generated.    COMPARISON: None.      FINDINGS:    CHEST:    PULMONARY ARTERIES: No evidence of acute pulmonary embolism.    LUNGS, PLEURA, AIRWAYS: Trace bibasilar subsegmental atelectatic changes.   No pleural effusion. No pneumothorax. No evidence of central   endobronchial obstruction.    THORACIC NODES: No mediastinal, hilar, supraclavicular, or axillary   lymphadenopathy.    MEDIASTINUM/GREAT VESSELS: No pericardial effusion. Heart size is within   normal limits. The aorta and main pulmonary artery are of normal caliber.    ABDOMEN/PELVIS:    HEPATOBILIARY: Unremarkable.    SPLEEN: There are several splenic calcifications compatible with sequela   of prior infection    PANCREAS: Unremarkable.    ADRENAL GLANDS: Unremarkable.    KIDNEYS: Symmetric pattern of renal enhancement. No hydronephrosis   bilaterally.    ABDOMINOPELVIC NODES: No lymphadenopathy.    PELVIC ORGANS: Unremarkable.    PERITONEUM/MESENTERY/BOWEL: No bowel obstruction. No ascites or   pneumoperitoneum.    BONES/SOFT TISSUES: Inferior T7 and superior T8 endplate erosive changes   with adjacent soft tissue prominence suspicious for an infectious   discitis/osteomyelitis osteomyelitis.    T10 superior endplate Schmorl's node.    OTHER: Normal caliber abdominal aorta      IMPRESSION:      No evidence of acute pulmonary embolism.    Inferior T7 and superior T8 endplate erosive changes with adjacent soft   tissue prominence suspicious for an infectious discitis/osteomyelitis   osteomyelitis.    T10 superior endplate Schmorl's node. This has a benign appearance      --- End ofReport ---    ***Please see the addendum at the top of this report. It may contain   additional important information or changes.****      LANG CABRAL MD; Resident Radiologist  This document has been electronically signed.  GIL LEON MD; Attending Radiologist  This document has been electronically signed. Oct 13 2022  6:32AM  Addend:GIL LEON MD; Attending Radiologist  This addendum was electronically signed on: Oct 13 2022  7:05AM.    < end of copied text >      PHYSICAL EXAM:  GEN: in NAD  LUNGS: no visible respiratory distress or labored breathing, no audible wheezing  HEART: did not obtain (performed by attending)  ABD: did not obtain (performed by attending)  EXT: did not obtain (performed by attending)  NEURO: A&Ox3  - note: the rest of the physical exam performed by attending physician, patient refused the let me exam her

## 2022-10-16 LAB
ALBUMIN SERPL ELPH-MCNC: 3.6 G/DL — SIGNIFICANT CHANGE UP (ref 3.5–5.2)
ALP SERPL-CCNC: 103 U/L — SIGNIFICANT CHANGE UP (ref 30–115)
ALT FLD-CCNC: 19 U/L — SIGNIFICANT CHANGE UP (ref 0–41)
ANION GAP SERPL CALC-SCNC: 9 MMOL/L — SIGNIFICANT CHANGE UP (ref 7–14)
AST SERPL-CCNC: 19 U/L — SIGNIFICANT CHANGE UP (ref 0–41)
BASOPHILS # BLD AUTO: 0.04 K/UL — SIGNIFICANT CHANGE UP (ref 0–0.2)
BASOPHILS NFR BLD AUTO: 0.6 % — SIGNIFICANT CHANGE UP (ref 0–1)
BILIRUB SERPL-MCNC: <0.2 MG/DL — SIGNIFICANT CHANGE UP (ref 0.2–1.2)
BUN SERPL-MCNC: 14 MG/DL — SIGNIFICANT CHANGE UP (ref 10–20)
CALCIUM SERPL-MCNC: 9 MG/DL — SIGNIFICANT CHANGE UP (ref 8.4–10.5)
CHLORIDE SERPL-SCNC: 102 MMOL/L — SIGNIFICANT CHANGE UP (ref 98–110)
CO2 SERPL-SCNC: 29 MMOL/L — SIGNIFICANT CHANGE UP (ref 17–32)
CREAT SERPL-MCNC: 0.8 MG/DL — SIGNIFICANT CHANGE UP (ref 0.7–1.5)
EGFR: 95 ML/MIN/1.73M2 — SIGNIFICANT CHANGE UP
EOSINOPHIL # BLD AUTO: 0.41 K/UL — SIGNIFICANT CHANGE UP (ref 0–0.7)
EOSINOPHIL NFR BLD AUTO: 6.3 % — SIGNIFICANT CHANGE UP (ref 0–8)
GLUCOSE SERPL-MCNC: 90 MG/DL — SIGNIFICANT CHANGE UP (ref 70–99)
HCT VFR BLD CALC: 37.8 % — SIGNIFICANT CHANGE UP (ref 37–47)
HGB BLD-MCNC: 12 G/DL — SIGNIFICANT CHANGE UP (ref 12–16)
IMM GRANULOCYTES NFR BLD AUTO: 0.3 % — SIGNIFICANT CHANGE UP (ref 0.1–0.3)
LYMPHOCYTES # BLD AUTO: 3.25 K/UL — SIGNIFICANT CHANGE UP (ref 1.2–3.4)
LYMPHOCYTES # BLD AUTO: 49.7 % — SIGNIFICANT CHANGE UP (ref 20.5–51.1)
MAGNESIUM SERPL-MCNC: 2 MG/DL — SIGNIFICANT CHANGE UP (ref 1.8–2.4)
MCHC RBC-ENTMCNC: 26.1 PG — LOW (ref 27–31)
MCHC RBC-ENTMCNC: 31.7 G/DL — LOW (ref 32–37)
MCV RBC AUTO: 82.2 FL — SIGNIFICANT CHANGE UP (ref 81–99)
MONOCYTES # BLD AUTO: 0.86 K/UL — HIGH (ref 0.1–0.6)
MONOCYTES NFR BLD AUTO: 13.1 % — HIGH (ref 1.7–9.3)
NEUTROPHILS # BLD AUTO: 1.96 K/UL — SIGNIFICANT CHANGE UP (ref 1.4–6.5)
NEUTROPHILS NFR BLD AUTO: 30 % — LOW (ref 42.2–75.2)
NRBC # BLD: 0 /100 WBCS — SIGNIFICANT CHANGE UP (ref 0–0)
PLATELET # BLD AUTO: 317 K/UL — SIGNIFICANT CHANGE UP (ref 130–400)
POTASSIUM SERPL-MCNC: 4.3 MMOL/L — SIGNIFICANT CHANGE UP (ref 3.5–5)
POTASSIUM SERPL-SCNC: 4.3 MMOL/L — SIGNIFICANT CHANGE UP (ref 3.5–5)
PROT SERPL-MCNC: 7.3 G/DL — SIGNIFICANT CHANGE UP (ref 6–8)
RBC # BLD: 4.6 M/UL — SIGNIFICANT CHANGE UP (ref 4.2–5.4)
RBC # FLD: 13.8 % — SIGNIFICANT CHANGE UP (ref 11.5–14.5)
SODIUM SERPL-SCNC: 140 MMOL/L — SIGNIFICANT CHANGE UP (ref 135–146)
WBC # BLD: 6.54 K/UL — SIGNIFICANT CHANGE UP (ref 4.8–10.8)
WBC # FLD AUTO: 6.54 K/UL — SIGNIFICANT CHANGE UP (ref 4.8–10.8)

## 2022-10-16 PROCEDURE — 99232 SBSQ HOSP IP/OBS MODERATE 35: CPT

## 2022-10-16 RX ORDER — CHOLECALCIFEROL (VITAMIN D3) 125 MCG
2000 CAPSULE ORAL DAILY
Refills: 0 | Status: DISCONTINUED | OUTPATIENT
Start: 2022-10-16 | End: 2022-10-19

## 2022-10-16 RX ORDER — SENNA PLUS 8.6 MG/1
2 TABLET ORAL ONCE
Refills: 0 | Status: COMPLETED | OUTPATIENT
Start: 2022-10-16 | End: 2022-10-16

## 2022-10-16 RX ADMIN — PANTOPRAZOLE SODIUM 40 MILLIGRAM(S): 20 TABLET, DELAYED RELEASE ORAL at 06:05

## 2022-10-16 RX ADMIN — OXYCODONE HYDROCHLORIDE 10 MILLIGRAM(S): 5 TABLET ORAL at 14:42

## 2022-10-16 RX ADMIN — Medication 2000 UNIT(S): at 17:30

## 2022-10-16 RX ADMIN — SENNA PLUS 2 TABLET(S): 8.6 TABLET ORAL at 21:35

## 2022-10-16 RX ADMIN — Medication 2 MILLIGRAM(S): at 09:57

## 2022-10-16 RX ADMIN — OXYCODONE HYDROCHLORIDE 10 MILLIGRAM(S): 5 TABLET ORAL at 09:56

## 2022-10-16 RX ADMIN — OXYCODONE HYDROCHLORIDE 10 MILLIGRAM(S): 5 TABLET ORAL at 21:38

## 2022-10-16 RX ADMIN — Medication 2 MILLIGRAM(S): at 14:45

## 2022-10-16 NOTE — CHART NOTE - NSCHARTNOTEFT_GEN_A_CORE
Spoke with radiology resident on call regarding thoracic biopsy tomorrow.   Was informed by resident that IR will make the schedule in the morning and to call back after 630 AM tomorrow.   Will hold Lovenox for now and make NPO after MN for possible biopsy tomorrow.     Please call IR early in AM after 630 so patient can be placed on schedule for tomorrow. Spoke with radiology resident on call regarding biopsy tomorrow.   Was informed by resident that IR will make the schedule in the morning and to call back after 630 AM tomorrow.   Will hold Lovenox for now and make NPO after MN for possible biopsy tomorrow.     Please call IR early in AM after 630 so patient can be placed on schedule for tomorrow.

## 2022-10-16 NOTE — PROGRESS NOTE ADULT - SUBJECTIVE AND OBJECTIVE BOX
Brief Summarry:    Pt seen and examined at bedside.    VITAL SIGNS (Last 24 hrs):  T(C): 36.1 (10-16-22 @ 05:02), Max: 36.4 (10-15-22 @ 21:09)  HR: 67 (10-16-22 @ 05:02) (64 - 67)  BP: 106/53 (10-16-22 @ 05:02) (106/53 - 114/53)  RR: 16 (10-16-22 @ 05:02) (16 - 18)  SpO2: --  Wt(kg): --  Daily     Daily     I&O's Summary      PHYSICAL EXAM:  GENERAL: NAD, well-developed  HEAD:  Atraumatic, Normocephalic  EYES: EOMI, PERRLA, conjunctiva and sclera clear  NECK: Supple, No JVD  CHEST/LUNG: Clear to auscultation bilaterally; No wheeze  HEART: Regular rate and rhythm; No murmurs, rubs, or gallops  ABDOMEN: Soft, Nontender, Nondistended; Bowel sounds present  EXTREMITIES:  2+ Peripheral Pulses, No clubbing, cyanosis, or edema  PSYCH: AAOx3  NEUROLOGY: non-focal  SKIN: No rashes or lesions    Labs Reviewed  Spoke to patient in regards to abnormal labs.    CBC Full  -  ( 16 Oct 2022 08:34 )  WBC Count : 6.54 K/uL  Hemoglobin : 12.0 g/dL  Hematocrit : 37.8 %  Platelet Count - Automated : 317 K/uL  Mean Cell Volume : 82.2 fL  Mean Cell Hemoglobin : 26.1 pg  Mean Cell Hemoglobin Concentration : 31.7 g/dL  Auto Neutrophil # : 1.96 K/uL  Auto Lymphocyte # : 3.25 K/uL  Auto Monocyte # : 0.86 K/uL  Auto Eosinophil # : 0.41 K/uL  Auto Basophil # : 0.04 K/uL  Auto Neutrophil % : 30.0 %  Auto Lymphocyte % : 49.7 %  Auto Monocyte % : 13.1 %  Auto Eosinophil % : 6.3 %  Auto Basophil % : 0.6 %    BMP:    10-16 @ 08:34    Blood Urea Nitrogen - 14  Calcium - 9.0  Carbond Dioxide - 29  Chloride - 102  Creatinine - 0.8  Glucose - 90  Potassium - 4.3  Sodium - 140      Hemoglobin A1c -     Urine Culture:  10-13 @ 09:02 Urine culture: --    Culture Results:   No growth to date.  Method Type: --  Organism: --  Organism Identification: --  Specimen Source: .Blood Blood  10-13 @ 01:20 Urine culture: --    Culture Results:   <10,000 CFU/mL Normal Urogenital Glenna  Method Type: --  Organism: --  Organism Identification: --  Specimen Source: Clean Catch Clean Catch (Midstream)         MEDICATIONS  (STANDING):  influenza   Vaccine 0.5 milliLiter(s) IntraMuscular once  pantoprazole    Tablet 40 milliGRAM(s) Oral before breakfast    MEDICATIONS  (PRN):  ALBUTerol    90 MICROgram(s) HFA Inhaler 2 Puff(s) Inhalation every 6 hours PRN Shortness of Breath and/or Wheezing  ALPRAZolam 2 milliGRAM(s) Oral three times a day PRN Anxiety  naloxone 1 mG/mL Injection for Intranasal Use 4 milliGRAM(s) IntraNasal once PRN Over sedation w/ Opioid use  oxyCODONE    IR 10 milliGRAM(s) Oral every 4 hours PRN Severe Pain (7 - 10)
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
## 2022-10-17 LAB
ALBUMIN SERPL ELPH-MCNC: 3.6 G/DL — SIGNIFICANT CHANGE UP (ref 3.5–5.2)
ALP SERPL-CCNC: 89 U/L — SIGNIFICANT CHANGE UP (ref 30–115)
ALT FLD-CCNC: 18 U/L — SIGNIFICANT CHANGE UP (ref 0–41)
ANION GAP SERPL CALC-SCNC: 8 MMOL/L — SIGNIFICANT CHANGE UP (ref 7–14)
AST SERPL-CCNC: 19 U/L — SIGNIFICANT CHANGE UP (ref 0–41)
BASOPHILS # BLD AUTO: 0.05 K/UL — SIGNIFICANT CHANGE UP (ref 0–0.2)
BASOPHILS NFR BLD AUTO: 0.8 % — SIGNIFICANT CHANGE UP (ref 0–1)
BILIRUB SERPL-MCNC: <0.2 MG/DL — SIGNIFICANT CHANGE UP (ref 0.2–1.2)
BUN SERPL-MCNC: 12 MG/DL — SIGNIFICANT CHANGE UP (ref 10–20)
CALCIUM SERPL-MCNC: 8.6 MG/DL — SIGNIFICANT CHANGE UP (ref 8.4–10.5)
CHLORIDE SERPL-SCNC: 104 MMOL/L — SIGNIFICANT CHANGE UP (ref 98–110)
CO2 SERPL-SCNC: 28 MMOL/L — SIGNIFICANT CHANGE UP (ref 17–32)
CREAT SERPL-MCNC: 0.7 MG/DL — SIGNIFICANT CHANGE UP (ref 0.7–1.5)
EGFR: 111 ML/MIN/1.73M2 — SIGNIFICANT CHANGE UP
EOSINOPHIL # BLD AUTO: 0.44 K/UL — SIGNIFICANT CHANGE UP (ref 0–0.7)
EOSINOPHIL NFR BLD AUTO: 7.3 % — SIGNIFICANT CHANGE UP (ref 0–8)
GLUCOSE SERPL-MCNC: 76 MG/DL — SIGNIFICANT CHANGE UP (ref 70–99)
HCT VFR BLD CALC: 38 % — SIGNIFICANT CHANGE UP (ref 37–47)
HGB BLD-MCNC: 11.9 G/DL — LOW (ref 12–16)
IMM GRANULOCYTES NFR BLD AUTO: 0.2 % — SIGNIFICANT CHANGE UP (ref 0.1–0.3)
LYMPHOCYTES # BLD AUTO: 2.92 K/UL — SIGNIFICANT CHANGE UP (ref 1.2–3.4)
LYMPHOCYTES # BLD AUTO: 48.7 % — SIGNIFICANT CHANGE UP (ref 20.5–51.1)
MAGNESIUM SERPL-MCNC: 2 MG/DL — SIGNIFICANT CHANGE UP (ref 1.8–2.4)
MCHC RBC-ENTMCNC: 26 PG — LOW (ref 27–31)
MCHC RBC-ENTMCNC: 31.3 G/DL — LOW (ref 32–37)
MCV RBC AUTO: 83.2 FL — SIGNIFICANT CHANGE UP (ref 81–99)
MONOCYTES # BLD AUTO: 0.89 K/UL — HIGH (ref 0.1–0.6)
MONOCYTES NFR BLD AUTO: 14.9 % — HIGH (ref 1.7–9.3)
NEUTROPHILS # BLD AUTO: 1.68 K/UL — SIGNIFICANT CHANGE UP (ref 1.4–6.5)
NEUTROPHILS NFR BLD AUTO: 28.1 % — LOW (ref 42.2–75.2)
NRBC # BLD: 0 /100 WBCS — SIGNIFICANT CHANGE UP (ref 0–0)
PLATELET # BLD AUTO: 306 K/UL — SIGNIFICANT CHANGE UP (ref 130–400)
POTASSIUM SERPL-MCNC: 4.4 MMOL/L — SIGNIFICANT CHANGE UP (ref 3.5–5)
POTASSIUM SERPL-SCNC: 4.4 MMOL/L — SIGNIFICANT CHANGE UP (ref 3.5–5)
PROT SERPL-MCNC: 6.9 G/DL — SIGNIFICANT CHANGE UP (ref 6–8)
RBC # BLD: 4.57 M/UL — SIGNIFICANT CHANGE UP (ref 4.2–5.4)
RBC # FLD: 14 % — SIGNIFICANT CHANGE UP (ref 11.5–14.5)
SODIUM SERPL-SCNC: 140 MMOL/L — SIGNIFICANT CHANGE UP (ref 135–146)
WBC # BLD: 5.99 K/UL — SIGNIFICANT CHANGE UP (ref 4.8–10.8)
WBC # FLD AUTO: 5.99 K/UL — SIGNIFICANT CHANGE UP (ref 4.8–10.8)

## 2022-10-17 PROCEDURE — 99232 SBSQ HOSP IP/OBS MODERATE 35: CPT

## 2022-10-17 RX ORDER — ENOXAPARIN SODIUM 100 MG/ML
40 INJECTION SUBCUTANEOUS EVERY 24 HOURS
Refills: 0 | Status: DISCONTINUED | OUTPATIENT
Start: 2022-10-17 | End: 2022-10-17

## 2022-10-17 RX ADMIN — OXYCODONE HYDROCHLORIDE 10 MILLIGRAM(S): 5 TABLET ORAL at 22:55

## 2022-10-17 RX ADMIN — Medication 2 MILLIGRAM(S): at 09:46

## 2022-10-17 RX ADMIN — PANTOPRAZOLE SODIUM 40 MILLIGRAM(S): 20 TABLET, DELAYED RELEASE ORAL at 05:17

## 2022-10-17 RX ADMIN — Medication 2 MILLIGRAM(S): at 22:57

## 2022-10-17 RX ADMIN — Medication 2 MILLIGRAM(S): at 18:54

## 2022-10-17 RX ADMIN — OXYCODONE HYDROCHLORIDE 10 MILLIGRAM(S): 5 TABLET ORAL at 18:53

## 2022-10-17 RX ADMIN — ENOXAPARIN SODIUM 40 MILLIGRAM(S): 100 INJECTION SUBCUTANEOUS at 09:03

## 2022-10-17 RX ADMIN — OXYCODONE HYDROCHLORIDE 10 MILLIGRAM(S): 5 TABLET ORAL at 23:30

## 2022-10-17 RX ADMIN — OXYCODONE HYDROCHLORIDE 10 MILLIGRAM(S): 5 TABLET ORAL at 09:46

## 2022-10-17 RX ADMIN — OXYCODONE HYDROCHLORIDE 10 MILLIGRAM(S): 5 TABLET ORAL at 10:20

## 2022-10-17 RX ADMIN — Medication 2000 UNIT(S): at 12:12

## 2022-10-17 NOTE — PROGRESS NOTE ADULT - SUBJECTIVE AND OBJECTIVE BOX
J LUISLISACANDE  42y Female    INTERVAL HPI/OVERNIGHT EVENTS:    pt ambulating and has some pain under her breasts and going around in a band around her chest.  no fever, back pain, N/V, SOB, chest pain  ROS negative  wants to go home with home care/IV abx on discharge    T(F): 97 (10-17-22 @ 14:06), Max: 97.5 (10-17-22 @ 05:20)  HR: 73 (10-17-22 @ 14:06) (70 - 77)  BP: 120/66 (10-17-22 @ 14:06) (105/63 - 120/66)  RR: 18 (10-17-22 @ 14:06) (18 - 18)  SpO2: 97% (10-17-22 @ 06:59) (97% - 97%) on RA    PHYSICAL EXAM:  GENERAL: NAD  HEAD:  Normocephalic  EYES:  conjunctiva and sclera clear  ENMT: Moist mucous membranes  NERVOUS SYSTEM:  Alert, awake, Good concentration  other times, sleepy but arousable  CHEST/LUNG: CTA b/l  HEART: Regular rate and rhythm  ABDOMEN: Soft, Nontender, Nondistended; Bowel sounds present  EXTREMITIES:   No edema  SKIN: warm, dry    Consultant(s) Notes Reviewed:  [x ] YES  [ ] NO  Care Discussed with Consultants/Other Providers [ x] YES  [ ] NO    MEDICATIONS  (STANDING):  cholecalciferol 2000 Unit(s) Oral daily  influenza   Vaccine 0.5 milliLiter(s) IntraMuscular once  pantoprazole    Tablet 40 milliGRAM(s) Oral before breakfast    MEDICATIONS  (PRN):  ALBUTerol    90 MICROgram(s) HFA Inhaler 2 Puff(s) Inhalation every 6 hours PRN Shortness of Breath and/or Wheezing  ALPRAZolam 2 milliGRAM(s) Oral three times a day PRN Anxiety  naloxone 1 mG/mL Injection for Intranasal Use 4 milliGRAM(s) IntraNasal once PRN Over sedation w/ Opioid use  oxyCODONE    IR 10 milliGRAM(s) Oral every 4 hours PRN Severe Pain (7 - 10)      LABS:                        11.9   5.99  )-----------( 306      ( 17 Oct 2022 07:30 )             38.0     10-17    140  |  104  |  12  ----------------------------<  76  4.4   |  28  |  0.7    Ca    8.6      17 Oct 2022 07:30  Mg     2.0     10-17    TPro  6.9  /  Alb  3.6  /  TBili  <0.2  /  DBili  x   /  AST  19  /  ALT  18  /  AlkPhos  89  10-17        Culture - Blood (collected 15 Oct 2022 04:30)  Source: .Blood Blood  Preliminary Report (16 Oct 2022 21:01):    No growth to date.        RADIOLOGY & ADDITIONAL TESTS:    Imaging or report Personally Reviewed:  [x ] YES  [ ] NO    < from: MR Lumbar Spine w/wo IV Cont (10.14.22 @ 16:44) >    IMPRESSION:    1.  No MRI evidence of lumbar discitis osteomyelitis.    2.  Mild degenerative changes at L4-5 and L5-S1 with mild foraminal   narrowing.      < end of copied text >      < from: MR Thoracic Spine w/wo IV Cont (10.14.22 @ 16:44) >  IMPRESSION:    1.  Findings compatible with T7-8 discitis osteomyelitis, with erosive   changes across the opposing endplates (greater at T7), and edema and   enhancement within the vertebral bodies and intervertebral discs.    2.  Enhancing inflammatory soft tissue within the paraspinal space   surrounding the T7 and T8 vertebral bodies, within the T7-8 neural   foramen greater on the right and within the ventral and lateral epidural   space greater on the right.    3.  No evidence of spinal cord compression or edema.    < end of copied text >      < from: CT Abdomen and Pelvis w/ IV Cont (10.13.22 @ 05:33) >  IMPRESSION:      No evidence of acute pulmonary embolism.    Inferior T7 and superior T8 endplate erosive changes with adjacent soft   tissue prominence suspicious for an infectious discitis/osteomyelitis   osteomyelitis.    T10 superior endplate Schmorl's node. This has a benign appearance      < end of copied text >      < from: US Abdomen Upper Quadrant Right (10.13.22 @ 01:38) >  IMPRESSION:  Normal right upper quadrant abdominal ultrasound.    < end of copied text >      Case discussed with residents and RN on rounds today    Care discussed with pt
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
## 2022-10-17 NOTE — PROGRESS NOTE ADULT - SUBJECTIVE AND OBJECTIVE BOX
CANDE DIETZ  42y, Female  Allergy: No Known Allergies      LOS  4d    CHIEF COMPLAINT: Epigastric and Back Pain (16 Oct 2022 12:59)      INTERVAL EVENTS/HPI  - No acute events overnight  - T(F): , Max: 97.5 (10-17-22 @ 05:20)  - Tolerating medication  - WBC Count: 5.99 (10-17-22 @ 07:30)  WBC Count: 6.54 (10-16-22 @ 08:34)     - Creatinine, Serum: 0.7 (10-17-22 @ 07:30)  Creatinine, Serum: 0.8 (10-16-22 @ 08:34)       ROS  ***    VITALS:  T(F): 97.5, Max: 97.5 (10-17-22 @ 05:20)  HR: 70  BP: 105/63  RR: 18Vital Signs Last 24 Hrs  T(C): 36.4 (17 Oct 2022 06:59), Max: 36.4 (17 Oct 2022 05:20)  T(F): 97.5 (17 Oct 2022 06:59), Max: 97.5 (17 Oct 2022 05:20)  HR: 70 (17 Oct 2022 06:59) (70 - 77)  BP: 105/63 (17 Oct 2022 06:59) (97/56 - 109/63)  BP(mean): --  RR: 18 (17 Oct 2022 06:59) (18 - 18)  SpO2: 97% (17 Oct 2022 06:59) (97% - 97%)        PHYSICAL EXAM:  ***    FH: Non-contributory  Social Hx: Non-contributory    TESTS & MEASUREMENTS:                        11.9   5.99  )-----------( 306      ( 17 Oct 2022 07:30 )             38.0     10-17    140  |  104  |  12  ----------------------------<  76  4.4   |  28  |  0.7    Ca    8.6      17 Oct 2022 07:30  Mg     2.0     10-17    TPro  6.9  /  Alb  3.6  /  TBili  <0.2  /  DBili  x   /  AST  19  /  ALT  18  /  AlkPhos  89  10-17      LIVER FUNCTIONS - ( 17 Oct 2022 07:30 )  Alb: 3.6 g/dL / Pro: 6.9 g/dL / ALK PHOS: 89 U/L / ALT: 18 U/L / AST: 19 U/L / GGT: x               Culture - Blood (collected 10-15-22 @ 04:30)  Source: .Blood Blood  Preliminary Report (10-16-22 @ 21:01):    No growth to date.    Culture - Blood (collected 10-13-22 @ 09:02)  Source: .Blood Blood  Preliminary Report (10-14-22 @ 18:02):    No growth to date.    Culture - Blood (collected 10-13-22 @ 09:02)  Source: .Blood Blood  Preliminary Report (10-14-22 @ 18:02):    No growth to date.    Culture - Urine (collected 10-13-22 @ 01:20)  Source: Clean Catch Clean Catch (Midstream)  Final Report (10-14-22 @ 07:39):    <10,000 CFU/mL Normal Urogenital Glenna        Blood Gas Venous - Lactate: 1.20 mmol/L (10-13-22 @ 02:52)  Lactate, Blood: 1.1 mmol/L (10-13-22 @ 02:29)      INFECTIOUS DISEASES TESTING  COVID-19 PCR: NotDetec (10-13-22 @ 23:15)  strept    INFLAMMATORY MARKERS  Sedimentation Rate, Erythrocyte: 74 mm/Hr (10-15-22 @ 04:30)  C-Reactive Protein, Serum: 10.3 mg/L (10-15-22 @ 04:30)      RADIOLOGY & ADDITIONAL TESTS:  I have personally reviewed the last available Chest xray  CXR      CT      CARDIOLOGY TESTING  12 Lead ECG:   Ventricular Rate 61 BPM    Atrial Rate 61 BPM    P-R Interval 166 ms    QRS Duration 88 ms    Q-T Interval 498 ms    QTC Calculation(Bazett) 501 ms    P Axis 41 degrees    R Axis 42 degrees    T Axis 34 degrees    Diagnosis Line Normal sinus rhythm  Prolonged QT  Abnormal ECG    Confirmed by Enio Horta ([id number]) on 10/13/2022 3:29:40 PM (10-13-22 @ 13:05)      MEDICATIONS  cholecalciferol 2000 Oral daily  enoxaparin Injectable 40 SubCutaneous every 24 hours  influenza   Vaccine 0.5 IntraMuscular once  pantoprazole    Tablet 40 Oral before breakfast      WEIGHT  Weight (kg): 86.2 (10-12-22 @ 22:10)  Creatinine, Serum: 0.7 mg/dL (10-17-22 @ 07:30)      ANTIBIOTICS:      All available historical records have been reviewed       CANDE DIETZ  42y, Female  Allergy: No Known Allergies      LOS  4d    CHIEF COMPLAINT: Epigastric and Back Pain (16 Oct 2022 12:59)      INTERVAL EVENTS/HPI  - No acute events overnight- just had a xanax, very difficult to arouse  - T(F): , Max: 97.5 (10-17-22 @ 05:20)  - Tolerating medication  - WBC Count: 5.99 (10-17-22 @ 07:30)  WBC Count: 6.54 (10-16-22 @ 08:34)     - Creatinine, Serum: 0.7 (10-17-22 @ 07:30)  Creatinine, Serum: 0.8 (10-16-22 @ 08:34)       ROS  unable to obtain history secondary to patient's mental status and/or sedation     VITALS:  T(F): 97.5, Max: 97.5 (10-17-22 @ 05:20)  HR: 70  BP: 105/63  RR: 18Vital Signs Last 24 Hrs  T(C): 36.4 (17 Oct 2022 06:59), Max: 36.4 (17 Oct 2022 05:20)  T(F): 97.5 (17 Oct 2022 06:59), Max: 97.5 (17 Oct 2022 05:20)  HR: 70 (17 Oct 2022 06:59) (70 - 77)  BP: 105/63 (17 Oct 2022 06:59) (97/56 - 109/63)  BP(mean): --  RR: 18 (17 Oct 2022 06:59) (18 - 18)  SpO2: 97% (17 Oct 2022 06:59) (97% - 97%)        PHYSICAL EXAM:  Gen: NAD, resting in bed snoring- just had a xanax, very difficult to arouse  HEENT: Normocephalic, atraumatic  Neck: supple, no lymphadenopathy  CV: Regular rate & regular rhythm  Lungs: decreased BS at bases, no fremitus  Abdomen: Soft, BS present  Ext: Warm, well perfused  Neuro: drowsy   Skin: no rash, no erythema  Lines: no phlebitis     FH: Non-contributory  Social Hx: Non-contributory    TESTS & MEASUREMENTS:                        11.9   5.99  )-----------( 306      ( 17 Oct 2022 07:30 )             38.0     10-17    140  |  104  |  12  ----------------------------<  76  4.4   |  28  |  0.7    Ca    8.6      17 Oct 2022 07:30  Mg     2.0     10-17    TPro  6.9  /  Alb  3.6  /  TBili  <0.2  /  DBili  x   /  AST  19  /  ALT  18  /  AlkPhos  89  10-17      LIVER FUNCTIONS - ( 17 Oct 2022 07:30 )  Alb: 3.6 g/dL / Pro: 6.9 g/dL / ALK PHOS: 89 U/L / ALT: 18 U/L / AST: 19 U/L / GGT: x               Culture - Blood (collected 10-15-22 @ 04:30)  Source: .Blood Blood  Preliminary Report (10-16-22 @ 21:01):    No growth to date.    Culture - Blood (collected 10-13-22 @ 09:02)  Source: .Blood Blood  Preliminary Report (10-14-22 @ 18:02):    No growth to date.    Culture - Blood (collected 10-13-22 @ 09:02)  Source: .Blood Blood  Preliminary Report (10-14-22 @ 18:02):    No growth to date.    Culture - Urine (collected 10-13-22 @ 01:20)  Source: Clean Catch Clean Catch (Midstream)  Final Report (10-14-22 @ 07:39):    <10,000 CFU/mL Normal Urogenital Glenna        Blood Gas Venous - Lactate: 1.20 mmol/L (10-13-22 @ 02:52)  Lactate, Blood: 1.1 mmol/L (10-13-22 @ 02:29)      INFECTIOUS DISEASES TESTING  COVID-19 PCR: NotDetec (10-13-22 @ 23:15)  strept    INFLAMMATORY MARKERS  Sedimentation Rate, Erythrocyte: 74 mm/Hr (10-15-22 @ 04:30)  C-Reactive Protein, Serum: 10.3 mg/L (10-15-22 @ 04:30)      RADIOLOGY & ADDITIONAL TESTS:  I have personally reviewed the last available Chest xray  CXR      CT      CARDIOLOGY TESTING  12 Lead ECG:   Ventricular Rate 61 BPM    Atrial Rate 61 BPM    P-R Interval 166 ms    QRS Duration 88 ms    Q-T Interval 498 ms    QTC Calculation(Bazett) 501 ms    P Axis 41 degrees    R Axis 42 degrees    T Axis 34 degrees    Diagnosis Line Normal sinus rhythm  Prolonged QT  Abnormal ECG    Confirmed by Enio Horta ([id number]) on 10/13/2022 3:29:40 PM (10-13-22 @ 13:05)      MEDICATIONS  cholecalciferol 2000 Oral daily  enoxaparin Injectable 40 SubCutaneous every 24 hours  influenza   Vaccine 0.5 IntraMuscular once  pantoprazole    Tablet 40 Oral before breakfast      WEIGHT  Weight (kg): 86.2 (10-12-22 @ 22:10)  Creatinine, Serum: 0.7 mg/dL (10-17-22 @ 07:30)      ANTIBIOTICS:      All available historical records have been reviewed
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
## 2022-10-17 NOTE — PROGRESS NOTE ADULT - SUBJECTIVE AND OBJECTIVE BOX
SUBJECTIVE:    Patient is a 42y old Female who presents with a chief complaint of Epigastric and Back Pain (16 Oct 2022 12:59)    Currently admitted to medicine with the primary diagnosis of: Possible Discitis Osteomyelitis of T7-T8    Today is hospital day 4d.     Overnight Events:     No significant overnight events    SOCIAL HISTORY:  No significant social hx    ALLERGIES:  No Known Allergies    MEDICATIONS:  STANDING MEDICATIONS  cholecalciferol 2000 Unit(s) Oral daily  enoxaparin Injectable 40 milliGRAM(s) SubCutaneous every 24 hours  influenza   Vaccine 0.5 milliLiter(s) IntraMuscular once  pantoprazole    Tablet 40 milliGRAM(s) Oral before breakfast    PRN MEDICATIONS  ALBUTerol    90 MICROgram(s) HFA Inhaler 2 Puff(s) Inhalation every 6 hours PRN  ALPRAZolam 2 milliGRAM(s) Oral three times a day PRN  naloxone 1 mG/mL Injection for Intranasal Use 4 milliGRAM(s) IntraNasal once PRN  oxyCODONE    IR 10 milliGRAM(s) Oral every 4 hours PRN    VITALS:   ICU Vital Signs Last 24 Hrs  T(C): 36.4 (17 Oct 2022 06:59), Max: 36.4 (17 Oct 2022 05:20)  T(F): 97.5 (17 Oct 2022 06:59), Max: 97.5 (17 Oct 2022 05:20)  HR: 70 (17 Oct 2022 06:59) (70 - 77)  BP: 105/63 (17 Oct 2022 06:59) (97/56 - 109/63)  RR: 18 (17 Oct 2022 06:59) (18 - 18)  SpO2: 97% (17 Oct 2022 06:59) (97% - 97%)      LABS:                        11.9   5.99  )-----------( 306      ( 17 Oct 2022 07:30 )             38.0     10-17    140  |  104  |  12  ----------------------------<  76  4.4   |  28  |  0.7    Ca    8.6      17 Oct 2022 07:30  Mg     2.0     10-17    TPro  6.9  /  Alb  3.6  /  TBili  <0.2  /  DBili  x   /  AST  19  /  ALT  18  /  AlkPhos  89  10-17      Culture - Blood (collected 15 Oct 2022 04:30)  Source: .Blood Blood  Preliminary Report (16 Oct 2022 21:01):    No growth to date.      RADIOLOGY:    < from: MR Lumbar Spine w/wo IV Cont (10.14.22 @ 16:44) >    ACC: [id number] EXAM:  MR SPINE LUMBAR WAW IC                          PROCEDURE DATE:  10/14/2022      INTERPRETATION:  Clinical History / Reason for exam: Discitis   osteomyelitis    TECHNIQUE: MRI of lumbar spine with and without contrast. Multiplanar   multi sequential MRI of the lumbar spine was performed before and   following the intravenous administration of 9 cc of Gadavist (1 cc   discarded) on a 3 Ruba magnet.    Correlation with CT abdomen and pelvis dated 10/13/2022.    FINDINGS:    The lumbar vertebral bodies maintain normal height. Alignment   demonstrates trace retrolisthesis L4-5.    Bone marrow signal demonstrates T1 and T2 hyperintense (Modic type II)   endplate degenerative change across L4-5.    The conus medullaris terminates at the L1 level and is normal in signal.    There is disc desiccation and loss of disc space height at L4-5 and L5-S1.    At T12-L1, L1-2, and L2-3 there is no significant disc herniation, spinal   stenosis or foraminal impingement.    At L3-4 there is a small disc bulge with mild right foraminal narrowing.    At L4-5 there is a small disc bulge with superimposed small inferiorly   directed central disc herniation. There is mild facet hypertrophy with   mild spinal stenosis and mild foraminal narrowing.    At L5-S1 there is a small disc bulge with a small central superiorly   directed disc herniation. There is facet hypertrophy with mild foraminal   stenosis.    There are mild degenerative changes of the sacroiliac joints.    IMPRESSION:    1.  No MRI evidence of lumbar discitis osteomyelitis.    2.  Mild degenerative changes at L4-5 and L5-S1 with mild foraminal   narrowing.    --- End of Report ---      PHYSICAL EXAM: patient refused physical exam  GEN: in NAD  LUNGS:   HEART:   ABD:   EXT:   NEURO: A&Ox3

## 2022-10-17 NOTE — PROGRESS NOTE ADULT - ASSESSMENT
41 y/o woman with PMH of hypothyroidism (previously on synthroid), HTN, hyperlipidemia, anxiety, h/o IVDU (last used heroin about 7 years ago), h/o Hep C s/p treatment (5 years ago), opiate dependence and on methadone 190mg Daily via [facility] and active smoker who presented to the ED with epigastric pain radiating across to her mid back which has progressively worsened for the last 2 weeks. Patient was found to have CT findings of possible discitis and was admitted for medical management.    1. T7-8 discitis/OM  - ESR 74, CRP 10  - MRI of thoracic spine showed: Findings compatible with T7-8 discitis osteomyelitis, with erosive changes across the opposing endplates (greater at T7), and edema and enhancement within the vertebral bodies and intervertebral discs. Enhancing inflammatory soft tissue within the paraspinal space surrounding the T7 and T8 vertebral bodies, within the T7-8 neural foramen greater on the right and within the ventral and lateral epidural space greater on the right.  No evidence of spinal cord compression or edema.  - blood cultures negative to date  - ID consult and f/u appreciated  - hold abx until biopsy  - IR consulted for biopsy which is now scheduled for 10/19 (send labs per ID note)  - also needs PICC line  - After biopsy is performed, start Ceftriaxone 2g q24h IV and Vanco 1.25 q12h IV (goal trough 15-20) x 6 weeks  - Weekly CBC, CMP, ESR/CRP, Vanco trough  - ID follow-up with Dr. Seng Calles for Telehealth on discharge  - pain control (seen by pain management) - monitor for oversedation     2. Opiate dependence  on methadone 190mg daily  - patient follows at [facility] ([phone])  - counselor is Eric Stock ([phone] - direct number)    3. Tobacco use  encourage cessation    4. Anxiety   on alprazolam 2mg tid prn  pharmacy ([phone])    5. Hypothyroid  TSH -WNL  off synthroid    6. GERD/gastritis on PPI    7. Possible COPD   symbicort and albuterol prn    8. DVT prophylaxis - OOB and ambulating      full code      PROGRESS NOTE HANDOFF    Pending: IR biopsy of spine, PICC line placement    pt informed of the plan of care    Disposition: pt wants home with IV abx -> referrals will be sent per case management  she is currently refusing SNF

## 2022-10-17 NOTE — CONSULT NOTE ADULT - CONSULT REASON
Inferior T7 and superior T8 endplate erosive changes with adjacent soft   tissue prominence suspicious for an infectious discitis/osteomyelitis   osteomyelitis.  Non septic on admission
Osteomyelitis/discitis
T7-T8 discitis/osteomyelitis biopsy
chronic user of methadone 190mg now in axial back pain 2/2 possible discitis

## 2022-10-17 NOTE — CONSULT NOTE ADULT - SUBJECTIVE AND OBJECTIVE BOX
INTERVENTIONAL RADIOLOGY CONSULT:     Procedure Requested: T7-T8 discitis/osteomyelitis biopsy    HPI:  41 y/o F pt w/ PMH/o HTN, HLD, GERD, Opioid abuse, active smoker, hypothyroidism presents to the ED w/ RT sided epigastric pain radiating across to her mid-back for the past several days. Pt reports the pain is sharp, intermittent, moderate. Pt reports the pain persists and denies eliciting factors (eating, physical exertion). Pt denies f/c/b, n/v, trauma. Pt reports no hx/o similar pain in the past.    In the ED,   · BP Systolic	114 mm Hg  · BP Diastolic	82 mm Hg  · Heart Rate	  118 /min  · Respiration Rate (breaths/min)	18 /min  · Temp (F)	97.9 Degrees F  · Temp (C)	36.6 Degrees C  · Temp site	oral  · SpO2 (%)	99 %  CT abdomen/pelvis: Inferior T7 and superior T8 endplate erosive changes with adjacent soft tissue prominence suspicious for an infectious discitis/osteomyelitis osteomyelitis.T10 superior endplate Schmorl's node. (13 Oct 2022 13:55)      PAST MEDICAL & SURGICAL HISTORY:      MEDICATIONS  (STANDING):  cholecalciferol 2000 Unit(s) Oral daily  enoxaparin Injectable 40 milliGRAM(s) SubCutaneous every 24 hours  influenza   Vaccine 0.5 milliLiter(s) IntraMuscular once  pantoprazole    Tablet 40 milliGRAM(s) Oral before breakfast    MEDICATIONS  (PRN):  ALBUTerol    90 MICROgram(s) HFA Inhaler 2 Puff(s) Inhalation every 6 hours PRN Shortness of Breath and/or Wheezing  ALPRAZolam 2 milliGRAM(s) Oral three times a day PRN Anxiety  naloxone 1 mG/mL Injection for Intranasal Use 4 milliGRAM(s) IntraNasal once PRN Over sedation w/ Opioid use  oxyCODONE    IR 10 milliGRAM(s) Oral every 4 hours PRN Severe Pain (7 - 10)      Allergies  No Known Allergies    Intolerances      FAMILY HISTORY:      Physical Exam:   Vital Signs Last 24 Hrs  T(C): 36.4 (17 Oct 2022 06:59), Max: 36.4 (17 Oct 2022 05:20)  T(F): 97.5 (17 Oct 2022 06:59), Max: 97.5 (17 Oct 2022 05:20)  HR: 70 (17 Oct 2022 06:59) (70 - 77)  BP: 105/63 (17 Oct 2022 06:59) (97/56 - 109/63)  BP(mean): --  RR: 18 (17 Oct 2022 06:59) (18 - 18)  SpO2: 97% (17 Oct 2022 06:59) (97% - 97%)        Labs:                         11.9   5.99  )-----------( 306      ( 17 Oct 2022 07:30 )             38.0     10-17    140  |  104  |  12  ----------------------------<  76  4.4   |  28  |  0.7    Ca    8.6      17 Oct 2022 07:30  Mg     2.0     10-17    TPro  6.9  /  Alb  3.6  /  TBili  <0.2  /  DBili  x   /  AST  19  /  ALT  18  /  AlkPhos  89  10-17        Pertinent labs:                      11.9   5.99  )-----------( 306      ( 17 Oct 2022 07:30 )             38.0       10-17    140  |  104  |  12  ----------------------------<  76  4.4   |  28  |  0.7    Ca    8.6      17 Oct 2022 07:30  Mg     2.0     10-17    TPro  6.9  /  Alb  3.6  /  TBili  <0.2  /  DBili  x   /  AST  19  /  ALT  18  /  AlkPhos  89  10-17          Radiology & Additional Studies:   Radiology imaging reviewed.       ASSESSMENT/ PLAN:       INCOMPLETE    Risks, benefits, and alternatives to treatment discussed. All questions answered with understanding.    Thank you for the courtesy of this consult, please call [phone] with any further questions.    INTERVENTIONAL RADIOLOGY CONSULT:     Procedure Requested: T7-T8 discitis/osteomyelitis biopsy    HPI:  43 y/o F pt w/ PMH/o HTN, HLD, GERD, Opioid abuse, active smoker, hypothyroidism presents to the ED w/ RT sided epigastric pain radiating across to her mid-back for the past several days. Pt reports the pain is sharp, intermittent, moderate. Pt reports the pain persists and denies eliciting factors (eating, physical exertion). Pt denies f/c/b, n/v, trauma. Pt reports no hx/o similar pain in the past.    In the ED,   · BP Systolic	114 mm Hg  · BP Diastolic	82 mm Hg  · Heart Rate	  118 /min  · Respiration Rate (breaths/min)	18 /min  · Temp (F)	97.9 Degrees F  · Temp (C)	36.6 Degrees C  · Temp site	oral  · SpO2 (%)	99 %  CT abdomen/pelvis: Inferior T7 and superior T8 endplate erosive changes with adjacent soft tissue prominence suspicious for an infectious discitis/osteomyelitis osteomyelitis.T10 superior endplate Schmorl's node. (13 Oct 2022 13:55)      PAST MEDICAL & SURGICAL HISTORY:      MEDICATIONS  (STANDING):  cholecalciferol 2000 Unit(s) Oral daily  enoxaparin Injectable 40 milliGRAM(s) SubCutaneous every 24 hours  influenza   Vaccine 0.5 milliLiter(s) IntraMuscular once  pantoprazole    Tablet 40 milliGRAM(s) Oral before breakfast    MEDICATIONS  (PRN):  ALBUTerol    90 MICROgram(s) HFA Inhaler 2 Puff(s) Inhalation every 6 hours PRN Shortness of Breath and/or Wheezing  ALPRAZolam 2 milliGRAM(s) Oral three times a day PRN Anxiety  naloxone 1 mG/mL Injection for Intranasal Use 4 milliGRAM(s) IntraNasal once PRN Over sedation w/ Opioid use  oxyCODONE    IR 10 milliGRAM(s) Oral every 4 hours PRN Severe Pain (7 - 10)      Allergies  No Known Allergies    Intolerances      FAMILY HISTORY:      Physical Exam:   Vital Signs Last 24 Hrs  T(C): 36.4 (17 Oct 2022 06:59), Max: 36.4 (17 Oct 2022 05:20)  T(F): 97.5 (17 Oct 2022 06:59), Max: 97.5 (17 Oct 2022 05:20)  HR: 70 (17 Oct 2022 06:59) (70 - 77)  BP: 105/63 (17 Oct 2022 06:59) (97/56 - 109/63)  BP(mean): --  RR: 18 (17 Oct 2022 06:59) (18 - 18)  SpO2: 97% (17 Oct 2022 06:59) (97% - 97%)        Labs:                         11.9   5.99  )-----------( 306      ( 17 Oct 2022 07:30 )             38.0     10-17    140  |  104  |  12  ----------------------------<  76  4.4   |  28  |  0.7    Ca    8.6      17 Oct 2022 07:30  Mg     2.0     10-17    TPro  6.9  /  Alb  3.6  /  TBili  <0.2  /  DBili  x   /  AST  19  /  ALT  18  /  AlkPhos  89  10-17        Pertinent labs:                      11.9   5.99  )-----------( 306      ( 17 Oct 2022 07:30 )             38.0       10-17    140  |  104  |  12  ----------------------------<  76  4.4   |  28  |  0.7    Ca    8.6      17 Oct 2022 07:30  Mg     2.0     10-17    TPro  6.9  /  Alb  3.6  /  TBili  <0.2  /  DBili  x   /  AST  19  /  ALT  18  /  AlkPhos  89  10-17          Radiology & Additional Studies:   Radiology imaging reviewed.       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 Imaging suggestive of T7-T8 discitis/osteomyelitis.    -IR consulted for T7-T8 aspiration/biopsy  -Imaging reviewed  -Will attempt to perform procedure today 10/17 if schedule and labs permit, otherwise will be performed in future when schedule allows  -Please order STAT coagulation studies (PT/INR)  -Please place all desired diagnostic lab study orders to be run on sample in the EMR  -Hold anticoagulation/antiplatelets until after procedure  -Keep patient NPO      Thank you for the courtesy of this consult, please call [phone] with any further questions.    INTERVENTIONAL RADIOLOGY CONSULT:     Procedure Requested: T7-T8 discitis/osteomyelitis biopsy    HPI:  43 y/o F pt w/ PMH/o HTN, HLD, GERD, Opioid abuse, active smoker, hypothyroidism presents to the ED w/ RT sided epigastric pain radiating across to her mid-back for the past several days. Pt reports the pain is sharp, intermittent, moderate. Pt reports the pain persists and denies eliciting factors (eating, physical exertion). Pt denies f/c/b, n/v, trauma. Pt reports no hx/o similar pain in the past.    In the ED,   · BP Systolic	114 mm Hg  · BP Diastolic	82 mm Hg  · Heart Rate	  118 /min  · Respiration Rate (breaths/min)	18 /min  · Temp (F)	97.9 Degrees F  · Temp (C)	36.6 Degrees C  · Temp site	oral  · SpO2 (%)	99 %  CT abdomen/pelvis: Inferior T7 and superior T8 endplate erosive changes with adjacent soft tissue prominence suspicious for an infectious discitis/osteomyelitis osteomyelitis.T10 superior endplate Schmorl's node. (13 Oct 2022 13:55)      PAST MEDICAL & SURGICAL HISTORY:      MEDICATIONS  (STANDING):  cholecalciferol 2000 Unit(s) Oral daily  enoxaparin Injectable 40 milliGRAM(s) SubCutaneous every 24 hours  influenza   Vaccine 0.5 milliLiter(s) IntraMuscular once  pantoprazole    Tablet 40 milliGRAM(s) Oral before breakfast    MEDICATIONS  (PRN):  ALBUTerol    90 MICROgram(s) HFA Inhaler 2 Puff(s) Inhalation every 6 hours PRN Shortness of Breath and/or Wheezing  ALPRAZolam 2 milliGRAM(s) Oral three times a day PRN Anxiety  naloxone 1 mG/mL Injection for Intranasal Use 4 milliGRAM(s) IntraNasal once PRN Over sedation w/ Opioid use  oxyCODONE    IR 10 milliGRAM(s) Oral every 4 hours PRN Severe Pain (7 - 10)      Allergies  No Known Allergies    Intolerances      FAMILY HISTORY:      Physical Exam:   Vital Signs Last 24 Hrs  T(C): 36.4 (17 Oct 2022 06:59), Max: 36.4 (17 Oct 2022 05:20)  T(F): 97.5 (17 Oct 2022 06:59), Max: 97.5 (17 Oct 2022 05:20)  HR: 70 (17 Oct 2022 06:59) (70 - 77)  BP: 105/63 (17 Oct 2022 06:59) (97/56 - 109/63)  BP(mean): --  RR: 18 (17 Oct 2022 06:59) (18 - 18)  SpO2: 97% (17 Oct 2022 06:59) (97% - 97%)        Labs:                         11.9   5.99  )-----------( 306      ( 17 Oct 2022 07:30 )             38.0     10-17    140  |  104  |  12  ----------------------------<  76  4.4   |  28  |  0.7    Ca    8.6      17 Oct 2022 07:30  Mg     2.0     10-17    TPro  6.9  /  Alb  3.6  /  TBili  <0.2  /  DBili  x   /  AST  19  /  ALT  18  /  AlkPhos  89  10-17        Pertinent labs:                      11.9   5.99  )-----------( 306      ( 17 Oct 2022 07:30 )             38.0       10-17    140  |  104  |  12  ----------------------------<  76  4.4   |  28  |  0.7    Ca    8.6      17 Oct 2022 07:30  Mg     2.0     10-17    TPro  6.9  /  Alb  3.6  /  TBili  <0.2  /  DBili  x   /  AST  19  /  ALT  18  /  AlkPhos  89  10-17          Radiology & Additional Studies:   Radiology imaging reviewed.       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 Imaging suggestive of T7-T8 discitis/osteomyelitis.    -IR consulted for T7-T8 aspiration/biopsy  -Imaging reviewed  -Will attempt to perform procedure 10/19 as schedule and labs permit  -Please order CBC/CMP/Coagulations (PT/INR) studies evening prior to procedure  -Please place all desired diagnostic lab study orders to be run on sample in the EMR  -Hold Lovenox doses evening before and morning of procedure  -NPO after midnight on day of procedure      Thank you for the courtesy of this consult, please call [phone] with any further questions.

## 2022-10-18 LAB
ALBUMIN SERPL ELPH-MCNC: 3.9 G/DL — SIGNIFICANT CHANGE UP (ref 3.5–5.2)
ALBUMIN SERPL ELPH-MCNC: 4 G/DL — SIGNIFICANT CHANGE UP (ref 3.5–5.2)
ALP SERPL-CCNC: 102 U/L — SIGNIFICANT CHANGE UP (ref 30–115)
ALP SERPL-CCNC: 108 U/L — SIGNIFICANT CHANGE UP (ref 30–115)
ALT FLD-CCNC: 21 U/L — SIGNIFICANT CHANGE UP (ref 0–41)
ALT FLD-CCNC: 23 U/L — SIGNIFICANT CHANGE UP (ref 0–41)
ANION GAP SERPL CALC-SCNC: 11 MMOL/L — SIGNIFICANT CHANGE UP (ref 7–14)
ANION GAP SERPL CALC-SCNC: 6 MMOL/L — LOW (ref 7–14)
APTT BLD: 34.2 SEC — SIGNIFICANT CHANGE UP (ref 27–39.2)
AST SERPL-CCNC: 25 U/L — SIGNIFICANT CHANGE UP (ref 0–41)
AST SERPL-CCNC: 27 U/L — SIGNIFICANT CHANGE UP (ref 0–41)
BASOPHILS # BLD AUTO: 0.03 K/UL — SIGNIFICANT CHANGE UP (ref 0–0.2)
BASOPHILS # BLD AUTO: 0.05 K/UL — SIGNIFICANT CHANGE UP (ref 0–0.2)
BASOPHILS NFR BLD AUTO: 0.8 % — SIGNIFICANT CHANGE UP (ref 0–1)
BASOPHILS NFR BLD AUTO: 0.8 % — SIGNIFICANT CHANGE UP (ref 0–1)
BILIRUB SERPL-MCNC: <0.2 MG/DL — SIGNIFICANT CHANGE UP (ref 0.2–1.2)
BILIRUB SERPL-MCNC: <0.2 MG/DL — SIGNIFICANT CHANGE UP (ref 0.2–1.2)
BUN SERPL-MCNC: 11 MG/DL — SIGNIFICANT CHANGE UP (ref 10–20)
BUN SERPL-MCNC: 12 MG/DL — SIGNIFICANT CHANGE UP (ref 10–20)
CALCIUM SERPL-MCNC: 9.1 MG/DL — SIGNIFICANT CHANGE UP (ref 8.4–10.5)
CALCIUM SERPL-MCNC: 9.5 MG/DL — SIGNIFICANT CHANGE UP (ref 8.4–10.5)
CHLORIDE SERPL-SCNC: 103 MMOL/L — SIGNIFICANT CHANGE UP (ref 98–110)
CHLORIDE SERPL-SCNC: 95 MMOL/L — LOW (ref 98–110)
CO2 SERPL-SCNC: 29 MMOL/L — SIGNIFICANT CHANGE UP (ref 17–32)
CO2 SERPL-SCNC: 31 MMOL/L — SIGNIFICANT CHANGE UP (ref 17–32)
CREAT SERPL-MCNC: 0.7 MG/DL — SIGNIFICANT CHANGE UP (ref 0.7–1.5)
CREAT SERPL-MCNC: 0.9 MG/DL — SIGNIFICANT CHANGE UP (ref 0.7–1.5)
CULTURE RESULTS: SIGNIFICANT CHANGE UP
CULTURE RESULTS: SIGNIFICANT CHANGE UP
EGFR: 111 ML/MIN/1.73M2 — SIGNIFICANT CHANGE UP
EGFR: 82 ML/MIN/1.73M2 — SIGNIFICANT CHANGE UP
EOSINOPHIL # BLD AUTO: 0.35 K/UL — SIGNIFICANT CHANGE UP (ref 0–0.7)
EOSINOPHIL # BLD AUTO: 0.48 K/UL — SIGNIFICANT CHANGE UP (ref 0–0.7)
EOSINOPHIL NFR BLD AUTO: 7.6 % — SIGNIFICANT CHANGE UP (ref 0–8)
EOSINOPHIL NFR BLD AUTO: 9 % — HIGH (ref 0–8)
GLUCOSE SERPL-MCNC: 71 MG/DL — SIGNIFICANT CHANGE UP (ref 70–99)
GLUCOSE SERPL-MCNC: 88 MG/DL — SIGNIFICANT CHANGE UP (ref 70–99)
HCT VFR BLD CALC: 37.1 % — SIGNIFICANT CHANGE UP (ref 37–47)
HCT VFR BLD CALC: 40 % — SIGNIFICANT CHANGE UP (ref 37–47)
HGB BLD-MCNC: 11.8 G/DL — LOW (ref 12–16)
HGB BLD-MCNC: 12.4 G/DL — SIGNIFICANT CHANGE UP (ref 12–16)
HIV 1+2 AB+HIV1 P24 AG SERPL QL IA: SIGNIFICANT CHANGE UP
IMM GRANULOCYTES NFR BLD AUTO: 0.2 % — SIGNIFICANT CHANGE UP (ref 0.1–0.3)
IMM GRANULOCYTES NFR BLD AUTO: 0.5 % — HIGH (ref 0.1–0.3)
INR BLD: 0.94 RATIO — SIGNIFICANT CHANGE UP (ref 0.65–1.3)
LYMPHOCYTES # BLD AUTO: 1.33 K/UL — SIGNIFICANT CHANGE UP (ref 1.2–3.4)
LYMPHOCYTES # BLD AUTO: 3.24 K/UL — SIGNIFICANT CHANGE UP (ref 1.2–3.4)
LYMPHOCYTES # BLD AUTO: 34 % — SIGNIFICANT CHANGE UP (ref 20.5–51.1)
LYMPHOCYTES # BLD AUTO: 51.2 % — HIGH (ref 20.5–51.1)
MAGNESIUM SERPL-MCNC: 1.7 MG/DL — LOW (ref 1.8–2.4)
MAGNESIUM SERPL-MCNC: 2 MG/DL — SIGNIFICANT CHANGE UP (ref 1.8–2.4)
MCHC RBC-ENTMCNC: 25.9 PG — LOW (ref 27–31)
MCHC RBC-ENTMCNC: 26.2 PG — LOW (ref 27–31)
MCHC RBC-ENTMCNC: 31 G/DL — LOW (ref 32–37)
MCHC RBC-ENTMCNC: 31.8 G/DL — LOW (ref 32–37)
MCV RBC AUTO: 82.3 FL — SIGNIFICANT CHANGE UP (ref 81–99)
MCV RBC AUTO: 83.5 FL — SIGNIFICANT CHANGE UP (ref 81–99)
MONOCYTES # BLD AUTO: 0.12 K/UL — SIGNIFICANT CHANGE UP (ref 0.1–0.6)
MONOCYTES # BLD AUTO: 0.73 K/UL — HIGH (ref 0.1–0.6)
MONOCYTES NFR BLD AUTO: 11.5 % — HIGH (ref 1.7–9.3)
MONOCYTES NFR BLD AUTO: 3.1 % — SIGNIFICANT CHANGE UP (ref 1.7–9.3)
NEUTROPHILS # BLD AUTO: 1.82 K/UL — SIGNIFICANT CHANGE UP (ref 1.4–6.5)
NEUTROPHILS # BLD AUTO: 2.06 K/UL — SIGNIFICANT CHANGE UP (ref 1.4–6.5)
NEUTROPHILS NFR BLD AUTO: 28.7 % — LOW (ref 42.2–75.2)
NEUTROPHILS NFR BLD AUTO: 52.6 % — SIGNIFICANT CHANGE UP (ref 42.2–75.2)
NRBC # BLD: 0 /100 WBCS — SIGNIFICANT CHANGE UP (ref 0–0)
NRBC # BLD: 0 /100 WBCS — SIGNIFICANT CHANGE UP (ref 0–0)
PLATELET # BLD AUTO: 305 K/UL — SIGNIFICANT CHANGE UP (ref 130–400)
PLATELET # BLD AUTO: 337 K/UL — SIGNIFICANT CHANGE UP (ref 130–400)
POTASSIUM SERPL-MCNC: 4.7 MMOL/L — SIGNIFICANT CHANGE UP (ref 3.5–5)
POTASSIUM SERPL-MCNC: 4.9 MMOL/L — SIGNIFICANT CHANGE UP (ref 3.5–5)
POTASSIUM SERPL-SCNC: 4.7 MMOL/L — SIGNIFICANT CHANGE UP (ref 3.5–5)
POTASSIUM SERPL-SCNC: 4.9 MMOL/L — SIGNIFICANT CHANGE UP (ref 3.5–5)
PROT SERPL-MCNC: 7.6 G/DL — SIGNIFICANT CHANGE UP (ref 6–8)
PROT SERPL-MCNC: 8 G/DL — SIGNIFICANT CHANGE UP (ref 6–8)
PROTHROM AB SERPL-ACNC: 10.7 SEC — SIGNIFICANT CHANGE UP (ref 9.95–12.87)
RBC # BLD: 4.51 M/UL — SIGNIFICANT CHANGE UP (ref 4.2–5.4)
RBC # BLD: 4.79 M/UL — SIGNIFICANT CHANGE UP (ref 4.2–5.4)
RBC # FLD: 14 % — SIGNIFICANT CHANGE UP (ref 11.5–14.5)
RBC # FLD: 14 % — SIGNIFICANT CHANGE UP (ref 11.5–14.5)
SODIUM SERPL-SCNC: 135 MMOL/L — SIGNIFICANT CHANGE UP (ref 135–146)
SODIUM SERPL-SCNC: 140 MMOL/L — SIGNIFICANT CHANGE UP (ref 135–146)
SPECIMEN SOURCE: SIGNIFICANT CHANGE UP
SPECIMEN SOURCE: SIGNIFICANT CHANGE UP
WBC # BLD: 3.91 K/UL — LOW (ref 4.8–10.8)
WBC # BLD: 6.33 K/UL — SIGNIFICANT CHANGE UP (ref 4.8–10.8)
WBC # FLD AUTO: 3.91 K/UL — LOW (ref 4.8–10.8)
WBC # FLD AUTO: 6.33 K/UL — SIGNIFICANT CHANGE UP (ref 4.8–10.8)

## 2022-10-18 PROCEDURE — 99232 SBSQ HOSP IP/OBS MODERATE 35: CPT

## 2022-10-18 PROCEDURE — 93010 ELECTROCARDIOGRAM REPORT: CPT

## 2022-10-18 RX ORDER — METHADONE HYDROCHLORIDE 40 MG/1
190 TABLET ORAL DAILY
Refills: 0 | Status: DISCONTINUED | OUTPATIENT
Start: 2022-10-18 | End: 2022-10-19

## 2022-10-18 RX ORDER — METHADONE HYDROCHLORIDE 40 MG/1
190 TABLET ORAL DAILY
Refills: 0 | Status: DISCONTINUED | OUTPATIENT
Start: 2022-10-18 | End: 2022-10-18

## 2022-10-18 RX ADMIN — OXYCODONE HYDROCHLORIDE 10 MILLIGRAM(S): 5 TABLET ORAL at 14:15

## 2022-10-18 RX ADMIN — OXYCODONE HYDROCHLORIDE 10 MILLIGRAM(S): 5 TABLET ORAL at 13:54

## 2022-10-18 RX ADMIN — OXYCODONE HYDROCHLORIDE 10 MILLIGRAM(S): 5 TABLET ORAL at 18:49

## 2022-10-18 RX ADMIN — OXYCODONE HYDROCHLORIDE 10 MILLIGRAM(S): 5 TABLET ORAL at 09:49

## 2022-10-18 RX ADMIN — OXYCODONE HYDROCHLORIDE 10 MILLIGRAM(S): 5 TABLET ORAL at 09:07

## 2022-10-18 RX ADMIN — Medication 2 MILLIGRAM(S): at 18:03

## 2022-10-18 RX ADMIN — OXYCODONE HYDROCHLORIDE 10 MILLIGRAM(S): 5 TABLET ORAL at 18:04

## 2022-10-18 RX ADMIN — Medication 2000 UNIT(S): at 16:50

## 2022-10-18 RX ADMIN — Medication 2 MILLIGRAM(S): at 09:08

## 2022-10-18 RX ADMIN — PANTOPRAZOLE SODIUM 40 MILLIGRAM(S): 20 TABLET, DELAYED RELEASE ORAL at 05:09

## 2022-10-18 NOTE — PROGRESS NOTE ADULT - SUBJECTIVE AND OBJECTIVE BOX
SUBJECTIVE:    Patient is a 42y old Female who presents with a chief complaint of Back Pain (17 Oct 2022 09:59)    Currently admitted to medicine with the primary diagnosis of: Thoracic Discitis Osteomyelitis    Today is hospital day 5d.     Overnight Events:     No significant overnight events    PAST MEDICAL & SURGICAL HISTORY    Hypertension    Hyperlipidemia    Hypothyroidism    IVDU, Opioid Abuse, Methadone Dependence    No significant past surgical hx    SOCIAL HISTORY:  hx of IVDU heroin about 7 years ago  hx of opioid abuse  currently taking 190mg of methadone at clinic  active smoker, 25 pack year history    ALLERGIES:  No Known Allergies    MEDICATIONS:  STANDING MEDICATIONS  cholecalciferol 2000 Unit(s) Oral daily  influenza   Vaccine 0.5 milliLiter(s) IntraMuscular once  pantoprazole    Tablet 40 milliGRAM(s) Oral before breakfast    PRN MEDICATIONS  ALBUTerol    90 MICROgram(s) HFA Inhaler 2 Puff(s) Inhalation every 6 hours PRN  ALPRAZolam 2 milliGRAM(s) Oral three times a day PRN  naloxone 1 mG/mL Injection for Intranasal Use 4 milliGRAM(s) IntraNasal once PRN  oxyCODONE    IR 10 milliGRAM(s) Oral every 4 hours PRN    VITALS:   ICU Vital Signs Last 24 Hrs  T(C): 36.2 (18 Oct 2022 04:46), Max: 36.2 (17 Oct 2022 21:11)  T(F): 97.2 (18 Oct 2022 04:46), Max: 97.2 (18 Oct 2022 04:46)  HR: 69 (18 Oct 2022 04:46) (65 - 73)  BP: 110/62 (18 Oct 2022 04:46) (103/58 - 120/66)  RR: 18 (18 Oct 2022 04:46) (18 - 18)      LABS:                        11.9   5.99  )-----------( 306      ( 17 Oct 2022 07:30 )             38.0     10-17    140  |  104  |  12  ----------------------------<  76  4.4   |  28  |  0.7    Ca    8.6      17 Oct 2022 07:30  Mg     2.0     10-17    TPro  6.9  /  Alb  3.6  /  TBili  <0.2  /  DBili  x   /  AST  19  /  ALT  18  /  AlkPhos  89  10-17      RADIOLOGY:    < from: MR Thoracic Spine w/wo IV Cont (10.14.22 @ 16:44) >    ACC: [id number] EXAM:  MR SPINE THORACIC WAW IC                          PROCEDURE DATE:  10/14/2022      INTERPRETATION:  Clinical History / Reason for exam: Possible T7-8   discitis osteomyelitis on CT.    TECHNIQUE: MRI thoracic spine with andwithout contrast. Multiplanar   multisequential images of the thoracic spine was performed before and   following the intravenous administration of 9 cc of Gadavist (1 cc   discarded) on a 3 Ruba magnet.    Correlation is made with a CTA chest 10/13/2022.    FINDINGS:    There are erosive changes across the opposing T7-8 endplates, greater at   T7, better appreciated on CT. There is bone marrow edema and enhancement   within the T7 and T8 vertebral bodies as well as edema and enhancement of   the T7-8 intervertebral disc more pronounced anteriorly. Findings are   compatible with discitis osteomyelitis. Mild surrounding paraspinal   enhancing inflammatory soft tissue is noted. There are infiltrative   changes within the T7-8 neural foramen greater on the right side, and   mild infiltrative changes within the ventral/lateral epidural space   greater on the right side.    There is T1 and T2 hyperintense signal along the T10 superior endplate   adjacent to a Schmorl's node compatible with Modic type I endplate   degenerative change. There is also Modic type I endplate degenerative   change along the T11 and L1 superior endplates.    The remaining vertebral bodies maintain normal height. Alignment is   maintained.    The thoracic spinal cord is normal in signal.    There is no significant disc herniation, spinal stenosis or foraminal   impingement demonstrated.    IMPRESSION:    1.  Findings compatible with T7-8 discitis osteomyelitis, with erosive   changes across the opposing endplates (greater at T7), and edema and   enhancement within the vertebral bodies and intervertebral discs.    2.  Enhancing inflammatory soft tissue within the paraspinal space   surrounding the T7 and T8 vertebral bodies, within the T7-8 neural   foramengreater on the right and within the ventral and lateral epidural   space greater on the right.    3.  No evidence of spinal cord compression or edema.    --- End of Report ---            < from: MR Lumbar Spine w/wo IV Cont (10.14.22 @ 16:44) >    ACC: [id number] EXAM:  MR SPINE LUMBAR WAW IC                          PROCEDURE DATE:  10/14/2022      INTERPRETATION:  Clinical History / Reason for exam: Discitis   osteomyelitis    TECHNIQUE: MRI of lumbar spine with and without contrast. Multiplanar   multi sequential MRI of the lumbar spine was performed before and   following the intravenous administration of 9 cc of Gadavist (1 cc   discarded) on a 3 Ruba magnet.    Correlation with CT abdomen and pelvis dated 10/13/2022.    FINDINGS:    The lumbar vertebral bodies maintain normal height. Alignment   demonstrates trace retrolisthesis L4-5.    Bone marrow signal demonstrates T1 and T2 hyperintense (Modic type II)   endplate degenerative change across L4-5.    The conus medullaris terminates at the L1 level and is normal in signal.    There is disc desiccation and loss of disc space height at L4-5 and L5-S1.    At T12-L1, L1-2, and L2-3 there is no significant disc herniation, spinal   stenosis or foraminal impingement.    At L3-4 there is a small disc bulge with mild right foraminal narrowing.    At L4-5 there is a small disc bulge with superimposed small inferiorly   directed central disc herniation. There is mild facet hypertrophy with   mild spinal stenosis and mild foraminal narrowing.    At L5-S1 there is a small disc bulge with a small central superiorly   directed disc herniation. There is facet hypertrophy with mild foraminal   stenosis.    There are mild degenerative changes of the sacroiliac joints.    IMPRESSION:    1.  No MRI evidence of lumbar discitis osteomyelitis.    2.  Mild degenerative changes at L4-5 and L5-S1 with mild foraminal   narrowing.    --- End of Report ---      PHYSICAL EXAM: patient refused physical exam  GEN: in NAD, walking around organizing her room  LUNGS: unable to obtain  HEART: unable to obtain  ABD: unable to obtain  EXT: unable to obtain  NEURO: A&Ox3, aggressive

## 2022-10-18 NOTE — PROGRESS NOTE ADULT - SUBJECTIVE AND OBJECTIVE BOX
J LUISLISACANDE  42y Female    INTERVAL HPI/OVERNIGHT EVENTS:    discussed plan of care with pt today  she wants to leave ASAP  ambulating in the room  no fever    T(F): 97.2 (10-18-22 @ 04:46), Max: 97.2 (10-18-22 @ 04:46)  HR: 69 (10-18-22 @ 04:46) (65 - 69)  BP: 110/62 (10-18-22 @ 04:46) (103/58 - 110/62)  RR: 18 (10-18-22 @ 04:46) (18 - 18)    PHYSICAL EXAM:  GENERAL: NAD, ambulating in the room  HEAD:  Normocephalic    Consultant(s) Notes Reviewed:  [x ] YES  [ ] NO  Care Discussed with Consultants/Other Providers [ x] YES  [ ] NO    MEDICATIONS  (STANDING):  cholecalciferol 2000 Unit(s) Oral daily  influenza   Vaccine 0.5 milliLiter(s) IntraMuscular once  methadone    Tablet 190 milliGRAM(s) Oral daily  pantoprazole    Tablet 40 milliGRAM(s) Oral before breakfast    MEDICATIONS  (PRN):  ALBUTerol    90 MICROgram(s) HFA Inhaler 2 Puff(s) Inhalation every 6 hours PRN Shortness of Breath and/or Wheezing  ALPRAZolam 2 milliGRAM(s) Oral three times a day PRN Anxiety  naloxone 1 mG/mL Injection for Intranasal Use 4 milliGRAM(s) IntraNasal once PRN Over sedation w/ Opioid use  oxyCODONE    IR 10 milliGRAM(s) Oral every 4 hours PRN Severe Pain (7 - 10)      LABS:                        12.4   6.33  )-----------( 337      ( 18 Oct 2022 09:01 )             40.0     10-18    140  |  103  |  12  ----------------------------<  71  4.7   |  31  |  0.7    Ca    9.1      18 Oct 2022 09:01  Mg     2.0     10-18    TPro  7.6  /  Alb  3.9  /  TBili  <0.2  /  DBili  x   /  AST  25  /  ALT  21  /  AlkPhos  102  10-18              Case discussed with residents and RN on rounds today    Care discussed with pt

## 2022-10-18 NOTE — PROGRESS NOTE ADULT - ASSESSMENT
43 y/o woman with PMH of hypothyroidism (previously on synthroid), HTN, hyperlipidemia, anxiety, h/o IVDU (last used heroin about 7 years ago), h/o Hep C s/p treatment (5 years ago), opiate dependence and on methadone 190mg Daily via [facility] and active smoker who presented to the ED with epigastric pain radiating across to her mid back which has progressively worsened for the last 2 weeks. Patient was found to have CT findings of possible discitis and was admitted for medical management.    1. T7-8 discitis/OM  - ESR 74, CRP 10  - MRI of thoracic spine showed: Findings compatible with T7-8 discitis osteomyelitis, with erosive changes across the opposing endplates (greater at T7), and edema and enhancement within the vertebral bodies and intervertebral discs. Enhancing inflammatory soft tissue within the paraspinal space surrounding the T7 and T8 vertebral bodies, within the T7-8 neural foramen greater on the right and within the ventral and lateral epidural space greater on the right.  No evidence of spinal cord compression or edema.  - blood cultures negative to date  - ID consult and f/u appreciated  - hold abx until biopsy  - IR consulted for biopsy which is now scheduled for 10/19 (send labs per ID note)  - also needs PICC line  - After biopsy is performed, start Ceftriaxone 2g q24h IV and Vanco 1.25 q12h IV (goal trough 15-20) x 6 weeks  - Weekly CBC, CMP, ESR/CRP, Vanco trough  - ID follow-up with Dr. Seng Calles for Telehealth on discharge  - pain control (seen by pain management) - monitor for oversedation     2. Opiate dependence  on methadone 190mg daily  - patient follows at [facility] ([phone])  - counselor is Eric Stock ([phone] - direct number)    3. Tobacco use  encourage cessation    4. Anxiety   on alprazolam 2mg tid prn  pharmacy ([phone])    5. Hypothyroid  TSH -WNL  off synthroid    6. GERD/gastritis on PPI    7. Possible COPD   symbicort and albuterol prn    8. DVT prophylaxis - OOB and ambulating      full code      PROGRESS NOTE HANDOFF    Pending: IR biopsy of spine, PICC line placement hopefully 10/19    pt informed of the plan of care    Disposition: pt wants home with IV abx -> referrals will be sent per case management  she is currently refusing SNF
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
## 2022-10-19 VITALS
TEMPERATURE: 98 F | HEART RATE: 70 BPM | DIASTOLIC BLOOD PRESSURE: 54 MMHG | RESPIRATION RATE: 18 BRPM | WEIGHT: 190.04 LBS | HEIGHT: 64.02 IN | SYSTOLIC BLOOD PRESSURE: 110 MMHG | OXYGEN SATURATION: 99 %

## 2022-10-19 PROCEDURE — 99233 SBSQ HOSP IP/OBS HIGH 50: CPT

## 2022-10-19 RX ORDER — ACETAMINOPHEN 500 MG
650 TABLET ORAL ONCE
Refills: 0 | Status: COMPLETED | OUTPATIENT
Start: 2022-10-19 | End: 2022-10-19

## 2022-10-19 RX ADMIN — Medication 650 MILLIGRAM(S): at 03:33

## 2022-10-19 RX ADMIN — OXYCODONE HYDROCHLORIDE 10 MILLIGRAM(S): 5 TABLET ORAL at 09:31

## 2022-10-19 RX ADMIN — Medication 2 MILLIGRAM(S): at 09:30

## 2022-10-19 RX ADMIN — Medication 650 MILLIGRAM(S): at 08:57

## 2022-10-19 NOTE — PROGRESS NOTE ADULT - ATTENDING COMMENTS
Pt has been seen and examined this am.   Case and Plan discussed with her through out the day.                          11.8   3.91  )-----------( 305      ( 18 Oct 2022 21:15 )             37.1   10-18    135  |  95<L>  |  11  ----------------------------<  88  4.9   |  29  |  0.9    Ca    9.5      18 Oct 2022 21:15  Mg     1.7     10-18    TPro  8.0  /  Alb  4.0  /  TBili  <0.2  /  DBili  x   /  AST  27  /  ALT  23  /  AlkPhos  108  10-18      Dx: T7/8 Erosions / Diskitis / Suspected Active IVDA although on Methadone / Polysubstance abuse     Plan:   Going for PICC Line Placement and Neuro IR Guided Biopsy and cxs sample retrival   Hold abx for now per ID   f/u all cxs and blood cxs   check TTE     Pending: Bx and results of cxs for dx     Dispo: Acute Pt has been seen and examined this am.   Case and Plan discussed with her through out the day.                          11.8   3.91  )-----------( 305      ( 18 Oct 2022 21:15 )             37.1   10-18    135  |  95<L>  |  11  ----------------------------<  88  4.9   |  29  |  0.9    Ca    9.5      18 Oct 2022 21:15  Mg     1.7     10-18    TPro  8.0  /  Alb  4.0  /  TBili  <0.2  /  DBili  x   /  AST  27  /  ALT  23  /  AlkPhos  108  10-18      Dx: T7/8 Erosions / Diskitis w/ OM Spine / Suspected Active IVDA although on Methadone / Polysubstance abuse     Plan:   Going for PICC Line Placement and Neuro IR Guided Biopsy and cxs sample retrival   Hold abx for now per ID   f/u all cxs and blood cxs   check TTE     Pending: Bx and results of cxs for dx     Dispo: Acute

## 2022-10-19 NOTE — PROGRESS NOTE ADULT - SUBJECTIVE AND OBJECTIVE BOX
SUBJECTIVE:    Patient is a 42y old Female who presents with a chief complaint of Epigastric and Back Pain (18 Oct 2022 09:09)    Currently admitted to medicine with the primary diagnosis of: Thoracic Discitis Osteomyelitis    Today is hospital day 6d.     Overnight Events:     Patient had a fever overnight 101.6F    PAST MEDICAL & SURGICAL HISTORY  HTN  HLD  Hypothyroidism  IVDU hx  Methadone Dependent  Polysubstance Abuse hx  Active Smoker  Gastritis  Melena  GERD  Anxiety    SOCIAL HISTORY:  hx of IVDU heroin about 7 years ago  hx of opioid abuse  currently taking 190mg of methadone at clinic  active smoker, 25 pack year history    ALLERGIES:  No Known Allergies    MEDICATIONS:  STANDING MEDICATIONS  cholecalciferol 2000 Unit(s) Oral daily  influenza   Vaccine 0.5 milliLiter(s) IntraMuscular once  methadone    Tablet 190 milliGRAM(s) Oral daily  pantoprazole    Tablet 40 milliGRAM(s) Oral before breakfast    PRN MEDICATIONS  ALBUTerol    90 MICROgram(s) HFA Inhaler 2 Puff(s) Inhalation every 6 hours PRN  ALPRAZolam 2 milliGRAM(s) Oral three times a day PRN  naloxone 1 mG/mL Injection for Intranasal Use 4 milliGRAM(s) IntraNasal once PRN  oxyCODONE    IR 10 milliGRAM(s) Oral every 4 hours PRN    VITALS:   ICU Vital Signs Last 24 Hrs  T(C): 37.7 (19 Oct 2022 06:10), Max: 38.7 (19 Oct 2022 04:01)  T(F): 99.8 (19 Oct 2022 06:10), Max: 101.6 (19 Oct 2022 04:01)  HR: 97 (19 Oct 2022 04:01) (79 - 97)  BP: 106/55 (19 Oct 2022 04:01) (106/55 - 119/58)  RR: 18 (19 Oct 2022 04:01) (18 - 18)      LABS:                        11.8   3.91  )-----------( 305      ( 18 Oct 2022 21:15 )             37.1     10-18    135  |  95<L>  |  11  ----------------------------<  88  4.9   |  29  |  0.9    Ca    9.5      18 Oct 2022 21:15  Mg     1.7     10-18    TPro  8.0  /  Alb  4.0  /  TBili  <0.2  /  DBili  x   /  AST  27  /  ALT  23  /  AlkPhos  108  10-18    PT/INR - ( 18 Oct 2022 21:15 )   PT: 10.70 sec;   INR: 0.94 ratio         PTT - ( 18 Oct 2022 21:15 )  PTT:34.2 sec      RADIOLOGY:    < from: MR Thoracic Spine w/wo IV Cont (10.14.22 @ 16:44) >    ACC: [id number] EXAM:  MR SPINE THORACIC WAW IC                          PROCEDURE DATE:  10/14/2022      INTERPRETATION:  Clinical History / Reason for exam: Possible T7-8   discitis osteomyelitis on CT.    TECHNIQUE: MRI thoracic spine with andwithout contrast. Multiplanar   multisequential images of the thoracic spine was performed before and   following the intravenous administration of 9 cc of Gadavist (1 cc   discarded) on a 3 Ruba magnet.    Correlation is made with a CTA chest 10/13/2022.    FINDINGS:    There are erosive changes across the opposing T7-8 endplates, greater at   T7, better appreciated on CT. There is bone marrow edema and enhancement   within the T7 and T8 vertebral bodies as well as edema and enhancement of   the T7-8 intervertebral disc more pronounced anteriorly. Findings are   compatible with discitis osteomyelitis. Mild surrounding paraspinal   enhancing inflammatory soft tissue is noted. There are infiltrative   changes within the T7-8 neural foramen greater on the right side, and   mild infiltrative changes within the ventral/lateral epidural space   greater on the right side.    There is T1 and T2 hyperintense signal along the T10 superior endplate   adjacent to a Schmorl's node compatible with Modic type I endplate   degenerative change. There is also Modic type I endplate degenerative   change along the T11 and L1 superior endplates.    The remaining vertebral bodies maintain normal height. Alignment is   maintained.    The thoracic spinal cord is normal in signal.    There is no significant disc herniation, spinal stenosis or foraminal   impingement demonstrated.    IMPRESSION:    1.  Findings compatible with T7-8 discitis osteomyelitis, with erosive   changes across the opposing endplates (greater at T7), and edema and   enhancement within the vertebral bodies and intervertebral discs.    2.  Enhancing inflammatory soft tissue within the paraspinal space   surrounding the T7 and T8 vertebral bodies, within the T7-8 neural   foramengreater on the right and within the ventral and lateral epidural   space greater on the right.    3.  No evidence of spinal cord compression or edema.    --- End of Report ---      PHYSICAL EXAM: patient refused physical exam and is requesting another physician  GEN: in NAD  LUNGS:   HEART:   ABD:   EXT:   NEURO: A&Ox3, aggressive

## 2022-10-19 NOTE — PROGRESS NOTE ADULT - TIME BILLING
I have personally seen and examined this patient.  I have reviewed all pertinent clinical information and reviewed all relevant imaging and diagnostic studies personally.   If possible, I counseled the patient about diagnostic testing and treatment plan.   I discussed my recommendations with the primary team.
direct pt care and interdisciplinary rounds

## 2022-10-19 NOTE — PROGRESS NOTE ADULT - REASON FOR ADMISSION
Epigastric and Back Pain
Back Pain
Epigastric and Midback Pain
Epigastric and Back Pain
Epigastric and Back Pain
Thoracic Discitis Osteomyelitis

## 2022-10-19 NOTE — PROGRESS NOTE ADULT - SUBJECTIVE AND OBJECTIVE BOX
INTERVENTIONAL RADIOLOGY BRIEF-OPERATIVE NOTE    Procedure:  Percutaneous, CT-directed core needle biopsy of T7-T8 disc space lesion    Pre-Op Diagnosis:  Discitis/Osteomyelitis    Post-Op Diagnosis:  Same    Attending:  Thalia  Resident:  Elmo    Anesthesia (type):  [ ] General Anesthesia  [x] Sedation-- Versed, 2 mg and Fentanyl, 50 mcg, iv (in divided doses)  [ ] Spinal Anesthesia  [X] Local/Regional-- 1% Lidocaine, SQ 5 cc    Total Face-to-Face Sedation Time:  50 minutes  Contrast:  None    Blood Loss:  0 cc    Condition:   [ ] Critical  [ ] Serious  [ ] Fair   [x] Good    Findings/Follow up Plan of Care:  Attempted percutaneous biopsy of T7-T8 disc space using a 17/18G, 15/20cm Achieve coaxial needle system with CT-guidance.  Patient unable to tolerate prone positioning on the table with iv conscious sedation alone; she became agitated to the point where it was felt that further proceeding with biopsy would pose greater risk of harm to the patient than benefit.  The procedure was discontinued, yet the patient became irate.    Specimens Removed:  None    Implants:  None    Complications:  None immediate.    Disposition:  Back to floor.  Recommend any and all future image-directed interventions be performed with anaesthesia physician assisting.      Please call Interventional Radiology [phone] with any questions, concerns, or issues.

## 2022-10-19 NOTE — PROGRESS NOTE ADULT - PROVIDER SPECIALTY LIST ADULT
Internal Medicine
Internal Medicine
Intervent Radiology
Internal Medicine
Hospitalist
Hospitalist
Infectious Disease
Internal Medicine
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
## 2022-10-20 LAB
CULTURE RESULTS: SIGNIFICANT CHANGE UP
GAMMA INTERFERON BACKGROUND BLD IA-ACNC: 0.07 IU/ML — SIGNIFICANT CHANGE UP
M TB IFN-G BLD-IMP: NEGATIVE — SIGNIFICANT CHANGE UP
M TB IFN-G CD4+ BCKGRND COR BLD-ACNC: 0.07 IU/ML — SIGNIFICANT CHANGE UP
M TB IFN-G CD4+CD8+ BCKGRND COR BLD-ACNC: 0.09 IU/ML — SIGNIFICANT CHANGE UP
QUANT TB PLUS MITOGEN MINUS NIL: >10 IU/ML — SIGNIFICANT CHANGE UP
SPECIMEN SOURCE: SIGNIFICANT CHANGE UP

## 2022-10-24 ENCOUNTER — INPATIENT (INPATIENT)
Facility: HOSPITAL | Age: 42
LOS: 8 days | Discharge: HOME | End: 2022-11-02
Attending: INTERNAL MEDICINE | Admitting: INTERNAL MEDICINE

## 2022-10-24 VITALS
RESPIRATION RATE: 18 BRPM | TEMPERATURE: 97 F | WEIGHT: 197.09 LBS | OXYGEN SATURATION: 99 % | SYSTOLIC BLOOD PRESSURE: 135 MMHG | DIASTOLIC BLOOD PRESSURE: 76 MMHG | HEIGHT: 64 IN | HEART RATE: 64 BPM

## 2022-10-24 LAB
ALBUMIN SERPL ELPH-MCNC: 4.3 G/DL — SIGNIFICANT CHANGE UP (ref 3.5–5.2)
ALP SERPL-CCNC: 106 U/L — SIGNIFICANT CHANGE UP (ref 30–115)
ALT FLD-CCNC: 23 U/L — SIGNIFICANT CHANGE UP (ref 0–41)
ANION GAP SERPL CALC-SCNC: 14 MMOL/L — SIGNIFICANT CHANGE UP (ref 7–14)
AST SERPL-CCNC: 35 U/L — SIGNIFICANT CHANGE UP (ref 0–41)
BASOPHILS # BLD AUTO: 0.04 K/UL — SIGNIFICANT CHANGE UP (ref 0–0.2)
BASOPHILS NFR BLD AUTO: 0.6 % — SIGNIFICANT CHANGE UP (ref 0–1)
BILIRUB SERPL-MCNC: 0.2 MG/DL — SIGNIFICANT CHANGE UP (ref 0.2–1.2)
BUN SERPL-MCNC: 9 MG/DL — LOW (ref 10–20)
CALCIUM SERPL-MCNC: 9.4 MG/DL — SIGNIFICANT CHANGE UP (ref 8.4–10.5)
CHLORIDE SERPL-SCNC: 98 MMOL/L — SIGNIFICANT CHANGE UP (ref 98–110)
CO2 SERPL-SCNC: 26 MMOL/L — SIGNIFICANT CHANGE UP (ref 17–32)
CREAT SERPL-MCNC: 0.8 MG/DL — SIGNIFICANT CHANGE UP (ref 0.7–1.5)
EGFR: 95 ML/MIN/1.73M2 — SIGNIFICANT CHANGE UP
EOSINOPHIL # BLD AUTO: 0.18 K/UL — SIGNIFICANT CHANGE UP (ref 0–0.7)
EOSINOPHIL NFR BLD AUTO: 2.5 % — SIGNIFICANT CHANGE UP (ref 0–8)
GLUCOSE SERPL-MCNC: 82 MG/DL — SIGNIFICANT CHANGE UP (ref 70–99)
HCT VFR BLD CALC: 36.7 % — LOW (ref 37–47)
HGB BLD-MCNC: 12.1 G/DL — SIGNIFICANT CHANGE UP (ref 12–16)
IMM GRANULOCYTES NFR BLD AUTO: 0.1 % — SIGNIFICANT CHANGE UP (ref 0.1–0.3)
LACTATE SERPL-SCNC: 1.5 MMOL/L — SIGNIFICANT CHANGE UP (ref 0.7–2)
LYMPHOCYTES # BLD AUTO: 1.94 K/UL — SIGNIFICANT CHANGE UP (ref 1.2–3.4)
LYMPHOCYTES # BLD AUTO: 27.1 % — SIGNIFICANT CHANGE UP (ref 20.5–51.1)
MCHC RBC-ENTMCNC: 26.3 PG — LOW (ref 27–31)
MCHC RBC-ENTMCNC: 33 G/DL — SIGNIFICANT CHANGE UP (ref 32–37)
MCV RBC AUTO: 79.8 FL — LOW (ref 81–99)
MONOCYTES # BLD AUTO: 0.58 K/UL — SIGNIFICANT CHANGE UP (ref 0.1–0.6)
MONOCYTES NFR BLD AUTO: 8.1 % — SIGNIFICANT CHANGE UP (ref 1.7–9.3)
NEUTROPHILS # BLD AUTO: 4.4 K/UL — SIGNIFICANT CHANGE UP (ref 1.4–6.5)
NEUTROPHILS NFR BLD AUTO: 61.6 % — SIGNIFICANT CHANGE UP (ref 42.2–75.2)
NRBC # BLD: 0 /100 WBCS — SIGNIFICANT CHANGE UP (ref 0–0)
PLATELET # BLD AUTO: 413 K/UL — HIGH (ref 130–400)
POTASSIUM SERPL-MCNC: 4.8 MMOL/L — SIGNIFICANT CHANGE UP (ref 3.5–5)
POTASSIUM SERPL-SCNC: 4.8 MMOL/L — SIGNIFICANT CHANGE UP (ref 3.5–5)
PROT SERPL-MCNC: 8.4 G/DL — HIGH (ref 6–8)
RBC # BLD: 4.6 M/UL — SIGNIFICANT CHANGE UP (ref 4.2–5.4)
RBC # FLD: 14.2 % — SIGNIFICANT CHANGE UP (ref 11.5–14.5)
SARS-COV-2 RNA SPEC QL NAA+PROBE: SIGNIFICANT CHANGE UP
SODIUM SERPL-SCNC: 138 MMOL/L — SIGNIFICANT CHANGE UP (ref 135–146)
WBC # BLD: 7.15 K/UL — SIGNIFICANT CHANGE UP (ref 4.8–10.8)
WBC # FLD AUTO: 7.15 K/UL — SIGNIFICANT CHANGE UP (ref 4.8–10.8)

## 2022-10-24 PROCEDURE — 99285 EMERGENCY DEPT VISIT HI MDM: CPT

## 2022-10-24 RX ORDER — CEFTRIAXONE 500 MG/1
1000 INJECTION, POWDER, FOR SOLUTION INTRAMUSCULAR; INTRAVENOUS ONCE
Refills: 0 | Status: COMPLETED | OUTPATIENT
Start: 2022-10-24 | End: 2022-10-24

## 2022-10-24 RX ORDER — VANCOMYCIN HCL 1 G
1000 VIAL (EA) INTRAVENOUS ONCE
Refills: 0 | Status: COMPLETED | OUTPATIENT
Start: 2022-10-24 | End: 2022-10-24

## 2022-10-24 RX ORDER — MORPHINE SULFATE 50 MG/1
4 CAPSULE, EXTENDED RELEASE ORAL ONCE
Refills: 0 | Status: DISCONTINUED | OUTPATIENT
Start: 2022-10-24 | End: 2022-10-24

## 2022-10-24 RX ADMIN — MORPHINE SULFATE 4 MILLIGRAM(S): 50 CAPSULE, EXTENDED RELEASE ORAL at 20:57

## 2022-10-24 RX ADMIN — CEFTRIAXONE 100 MILLIGRAM(S): 500 INJECTION, POWDER, FOR SOLUTION INTRAMUSCULAR; INTRAVENOUS at 20:57

## 2022-10-24 RX ADMIN — Medication 250 MILLIGRAM(S): at 23:12

## 2022-10-24 RX ADMIN — CEFTRIAXONE 1000 MILLIGRAM(S): 500 INJECTION, POWDER, FOR SOLUTION INTRAMUSCULAR; INTRAVENOUS at 23:10

## 2022-10-24 RX ADMIN — MORPHINE SULFATE 4 MILLIGRAM(S): 50 CAPSULE, EXTENDED RELEASE ORAL at 22:27

## 2022-10-24 NOTE — ED PROVIDER NOTE - NS ED ROS FT
Constitutional: (-) fever  Eyes/ENT: (-) blurry vision, (-) epistaxis  Cardiovascular: (-) chest pain, (-) syncope  Respiratory: (-) cough, (-) shortness of breath  Gastrointestinal: (-) vomiting, (-) diarrhea  Gu: (-) dysuria, (-) hematuria  Musculoskeletal: (+) back pain, (-) neck pain, (-) joint pain  Integumentary: (-) rash, (-) edema  Neurological: (-) headache, (-) altered mental status  Allergic/Immunologic: (-) pruritus

## 2022-10-24 NOTE — ED PROVIDER NOTE - CLINICAL SUMMARY MEDICAL DECISION MAKING FREE TEXT BOX
Patient presented status post recent admission for thoracic spine osteomyelitis during which time she AMA'd is now returning for readmission.  On arrival to ED patient otherwise hemodynamically stable, states that pain is unchanged from prior admission. Obtained labs which were grossly unremarkable including no significant leukocytosis, anemia, signs of dehydration/JUHI, transaminitis or significant electrolyte abnormalities. Restarted on abx and will admit for further management. Patient agreeable with plan. HD stable at time of admission.

## 2022-10-24 NOTE — ED PROVIDER NOTE - PHYSICAL EXAMINATION
CONST: Pt appears uncomfortable  EYES: Sclera and conjunctiva clear.   ENT: No nasal discharge. Oropharynx normal appearing  NECK: Non-tender, no meningeal signs. normal ROM. supple   CARD: S1 S2; No jvd  RESP: Equal BS B/L, No wheezes, rhonchi or rales. No distress  GI: Soft, non-tender, non-distended. no cva tenderness. normal BS  MS: R lateral thoracic tenderness. Normal ROM in all extremities. pulses 2 +. no calf tenderness or swelling  SKIN: Warm, dry, no acute rashes. Good turgor  NEURO: A&Ox3, No focal deficits. Strength 5/5 with no sensory deficits. Steady gait.

## 2022-10-24 NOTE — ED PROVIDER NOTE - OBJECTIVE STATEMENT
42y F pmh IVDU, HTN, HLD presents for eval of R sided back pain. Pt has known thoracic osteomyelitis that she AMA'd from hospital due to having to find care for her son. Now presents with increased aching R sided back pain radiating to R abdomen, aggravated with movement, no improvement with ibuprofen.

## 2022-10-24 NOTE — ED ADULT TRIAGE NOTE - CHIEF COMPLAINT QUOTE
" I have pain on my right side abdomen but it was infection in my spine." Claimed was admitted x few days & went AMA

## 2022-10-24 NOTE — ED PROVIDER NOTE - ATTENDING SHARED VISIT SELECTORS
Pending Prescriptions:                       Disp   Refills    rOPINIRole (REQUIP) 0.5 MG tablet [Pharmac*90 tab*0        Sig: TAKE ONE TABLET BY MOUTH EVERY NIGHT AT BEDTIME    Signed Prescriptions:                        Disp   Refills    ondansetron (ZOFRAN-ODT) 4 MG ODT tab      20 tab*0        Sig: DISSOLVE ONE TABLET BY MOUTH EVERY 6 HOURS AS NEEDED           FOR NAUSEA  Authorizing Provider: KOLBY LIN  Ordering User: YAA NAVAS      Routing refill request to provider for review/approval because:  Labs not current:     CBC on record in past 12 months Protocol Details    ALT on record in past 12 months      Serum Creatinine on file in past 12 months          Yaa Navas RN          
History/Exam/Medical Decision Making

## 2022-10-24 NOTE — ED ADULT TRIAGE NOTE - SPO2 (%)
05/18/19 0800   Quick Adds   Type of Visit Initial Occupational Therapy Evaluation   Living Environment   Lives With alone   Living Arrangements house  (townhouse)   Home Accessibility stairs to enter home;stairs within home;other (see comments)  (does not need to access lower-level)   Number of Stairs, Main Entrance   (1 step to enter no rail)   Transportation Anticipated car, drives self   Living Environment Comment Pt. stays on main floor, does not use AD at baseline;pt. has comfort height toilet/vanity support;walk-in shower, no grab bars   Self-Care   Usual Activity Tolerance moderate   Current Activity Tolerance poor   Equipment Currently Used at Home   (owns a walker)   Activity/Exercise/Self-Care Comment Pt. typically indep. w/IADL's;pt. does own laundry, microwave cooking;gets MOW--Wed., Fri,, dtr. also brings meals on Mon. ;dtr. reports pt. does not clean, house dirty;pt. manages own medications using pillbox. Pt. does not have LifeAlert   Functional Level   Ambulation 0-->independent   Transferring 0-->independent   Toileting 0-->independent   Bathing 0-->independent   Dressing 0-->independent   Eating 0-->independent   Communication 0-->understands/communicates without difficulty   Swallowing 0-->swallows foods/liquids without difficulty   Cognition 1 - attention or memory deficits   Fall history within last six months yes   Number of times patient has fallen within last six months 4  (pt. stated at least 3-4 falls in the last 6 months)   Which of the above functional risks had a recent onset or change? ambulation;transferring;toileting;bathing;dressing   Prior Functional Level Comment indep. w/ I/ADl's   General Information   Onset of Illness/Injury or Date of Surgery - Date 05/17/19   Referring Physician Becki Alvarez PA-C   Additional Occupational Profile Info/Pertinent History of Current Problem OT:Per chart--Jayson Leija is an 89-year-old male with past medical history of complete heart  block, status post pacemaker placement, uncontrolled, diet-controlled type 2 diabetes, chronic kidney disease, atrial fibrillation, and hypothyroidism who is transferred from [facility] Emergency Department for evaluation of subacute subdural hematoma.  The patient lives independently and was last seen 4 days ago.  Daughter went to check on him and he was found down in his home incontinent of urine and feces.  He was noted to be altered by EMS.  He was brought to the Emergency Department at [facility].  He was unsure when or how he fell.  .  Workup included a head CT which was notable for a large right-sided subdural hematoma likely representing acute on chronic subdural hemorrhage.  There was associated midline shift to the left and mass effect in the lateral ventricle.     Precautions/Limitations fall precautions;oxygen therapy device and L/min;other (see comments)  (needs swallow eval.)   General Observations Pt.'s son + dtr. present;pt. intially sleepy, able to partcipate in eval.   Cognitive Status Examination   Orientation person;time   Level of Consciousness Alert,confused   Follows Commands (Cognition) follows one step commands;follows two step commands   Cognitive Comment needs repeating--? [place];will complete further cognitive testing   Visual Perception   Visual Perception Comments wears glasses;some difficulty with initially tracking to the L   Sensory Examination   Sensory Comments no numbness/tingling reported   Pain Assessment   Patient Currently in Pain Yes, see Vital Sign flowsheet  (reports pain in L knee)   Range of Motion (ROM)   ROM Comment RUE:WFL;LUE: able to demo. active shoulder flex. to grossly 30 degrees;able to demo. minimal active ROM elbow-thru hand(difficult to assess);able to move wrist slightly   Strength   Strength Comments RUE:WFL;LUE:3-/5 shoulder, elbow flex.;decreased distal strength   Hand Strength   Hand Strength Comments R=WFL;L=weak grasp   Coordination    Coordination Comments difficulty following directions to complete;able to demo. digit opposition R;will further assess   Mobility   Bed Mobility Comments max. A supine-sit   Transfer Skill: Sit to Stand   Level of Orange: Sit/Stand maximum assist (25% patients effort)  (mod-max. A)   Physical Assist/Nonphysical Assist: Sit/Stand verbal cues;2 persons   Balance   Balance Comments impaired   Lower Body Dressing   Level of Orange: Dress Lower Body dependent (less than 25% patients effort)   Toileting   Level of [place]: Toilet dependent (less than 25% patients effort)   Instrumental Activities of Daily Living (IADL)   Previous Responsibilities meal prep;housekeeping;laundry;medication management;driving   Activities of Daily Living Analysis   Impairments Contributing to Impaired Activities of Daily Living balance impaired;cognition impaired;coordination impaired;motor control impaired;postural control impaired;ROM decreased;strength decreased   General Therapy Interventions   Planned Therapy Interventions ADL retraining;cognition;fine motor coordination training;motor coordination training;neuromuscular re-education;ROM;strengthening;visual perception;progressive activity/exercise  (vurther vision assessment as indicated)   Clinical Impression   Criteria for Skilled Therapeutic Interventions Met yes, treatment indicated   OT Diagnosis Decline in ADL performance   Influenced by the following impairments L -sided weakness, impaired balance, impaired cognition   Assessment of Occupational Performance 5 or more Performance Deficits   Identified Performance Deficits Currently below baseline w/dressing,bathing, toileting, grooming, fx. transfers, IADL's   Clinical Decision Making (Complexity) Moderate complexity   Therapy Frequency daily  (increase to 2X/day as approp.)   Predicted Duration of Therapy Intervention (days/wks) 5-7 days   Anticipated Discharge Disposition Transitional Care Facility;Long Term  "Care Facility   Risks and Benefits of Treatment have been explained. Yes   Patient, Family & other staff in agreement with plan of care Yes   [facility] TM \"6 Clicks\"   2016, Trustees of [facility], under license to [employer].  All rights reserved.   6 Clicks Short Forms Daily Activity Inpatient Short Form   [facility] AM-PAC  \"6 Clicks\" Daily Activity Inpatient Short Form   1. Putting on and taking off regular lower body clothing? 1 - Total   2. Bathing (including washing, rinsing, drying)? 1 - Total   3. Toileting, which includes using toilet, bedpan or urinal? 1 - Total   4. Putting on and taking off regular upper body clothing? 2 - A Lot   5. Taking care of personal grooming such as brushing teeth? 2 - A Lot   6. Eating meals? 2 - A Lot   Daily Activity Raw Score (Score out of 24.Lower scores equate to lower levels of function) 9   Total Evaluation Time   Total Evaluation Time (Minutes) 14     " 99

## 2022-10-25 DIAGNOSIS — J44.9 CHRONIC OBSTRUCTIVE PULMONARY DISEASE, UNSPECIFIED: ICD-10-CM

## 2022-10-25 DIAGNOSIS — F12.10 CANNABIS ABUSE, UNCOMPLICATED: ICD-10-CM

## 2022-10-25 DIAGNOSIS — F41.9 ANXIETY DISORDER, UNSPECIFIED: ICD-10-CM

## 2022-10-25 DIAGNOSIS — K29.70 GASTRITIS, UNSPECIFIED, WITHOUT BLEEDING: ICD-10-CM

## 2022-10-25 DIAGNOSIS — M51.26 OTHER INTERVERTEBRAL DISC DISPLACEMENT, LUMBAR REGION: ICD-10-CM

## 2022-10-25 DIAGNOSIS — Z53.09 PROCEDURE AND TREATMENT NOT CARRIED OUT BECAUSE OF OTHER CONTRAINDICATION: ICD-10-CM

## 2022-10-25 DIAGNOSIS — K21.9 GASTRO-ESOPHAGEAL REFLUX DISEASE WITHOUT ESOPHAGITIS: ICD-10-CM

## 2022-10-25 DIAGNOSIS — M46.24 OSTEOMYELITIS OF VERTEBRA, THORACIC REGION: ICD-10-CM

## 2022-10-25 DIAGNOSIS — Z79.899 OTHER LONG TERM (CURRENT) DRUG THERAPY: ICD-10-CM

## 2022-10-25 DIAGNOSIS — F17.210 NICOTINE DEPENDENCE, CIGARETTES, UNCOMPLICATED: ICD-10-CM

## 2022-10-25 DIAGNOSIS — Z91.198 PATIENT'S NONCOMPLIANCE WITH OTHER MEDICAL TREATMENT AND REGIMEN FOR OTHER REASON: ICD-10-CM

## 2022-10-25 DIAGNOSIS — I10 ESSENTIAL (PRIMARY) HYPERTENSION: ICD-10-CM

## 2022-10-25 DIAGNOSIS — Z91.81 HISTORY OF FALLING: ICD-10-CM

## 2022-10-25 DIAGNOSIS — E78.5 HYPERLIPIDEMIA, UNSPECIFIED: ICD-10-CM

## 2022-10-25 DIAGNOSIS — F11.20 OPIOID DEPENDENCE, UNCOMPLICATED: ICD-10-CM

## 2022-10-25 DIAGNOSIS — Z53.29 PROCEDURE AND TREATMENT NOT CARRIED OUT BECAUSE OF PATIENT'S DECISION FOR OTHER REASONS: ICD-10-CM

## 2022-10-25 DIAGNOSIS — F14.10 COCAINE ABUSE, UNCOMPLICATED: ICD-10-CM

## 2022-10-25 DIAGNOSIS — M50.20 OTHER CERVICAL DISC DISPLACEMENT, UNSPECIFIED CERVICAL REGION: ICD-10-CM

## 2022-10-25 DIAGNOSIS — M51.44 SCHMORL'S NODES, THORACIC REGION: ICD-10-CM

## 2022-10-25 DIAGNOSIS — R07.9 CHEST PAIN, UNSPECIFIED: ICD-10-CM

## 2022-10-25 LAB
ALBUMIN SERPL ELPH-MCNC: 4 G/DL — SIGNIFICANT CHANGE UP (ref 3.5–5.2)
ALP SERPL-CCNC: 111 U/L — SIGNIFICANT CHANGE UP (ref 30–115)
ALT FLD-CCNC: 23 U/L — SIGNIFICANT CHANGE UP (ref 0–41)
ANION GAP SERPL CALC-SCNC: 10 MMOL/L — SIGNIFICANT CHANGE UP (ref 7–14)
AST SERPL-CCNC: 27 U/L — SIGNIFICANT CHANGE UP (ref 0–41)
BASOPHILS # BLD AUTO: 0.04 K/UL — SIGNIFICANT CHANGE UP (ref 0–0.2)
BASOPHILS NFR BLD AUTO: 0.7 % — SIGNIFICANT CHANGE UP (ref 0–1)
BILIRUB SERPL-MCNC: 0.3 MG/DL — SIGNIFICANT CHANGE UP (ref 0.2–1.2)
BUN SERPL-MCNC: 9 MG/DL — LOW (ref 10–20)
CALCIUM SERPL-MCNC: 9.6 MG/DL — SIGNIFICANT CHANGE UP (ref 8.4–10.5)
CHLORIDE SERPL-SCNC: 100 MMOL/L — SIGNIFICANT CHANGE UP (ref 98–110)
CO2 SERPL-SCNC: 27 MMOL/L — SIGNIFICANT CHANGE UP (ref 17–32)
CREAT SERPL-MCNC: 0.8 MG/DL — SIGNIFICANT CHANGE UP (ref 0.7–1.5)
EGFR: 95 ML/MIN/1.73M2 — SIGNIFICANT CHANGE UP
EOSINOPHIL # BLD AUTO: 0.23 K/UL — SIGNIFICANT CHANGE UP (ref 0–0.7)
EOSINOPHIL NFR BLD AUTO: 4.3 % — SIGNIFICANT CHANGE UP (ref 0–8)
GLUCOSE SERPL-MCNC: 94 MG/DL — SIGNIFICANT CHANGE UP (ref 70–99)
HCT VFR BLD CALC: 37 % — SIGNIFICANT CHANGE UP (ref 37–47)
HGB BLD-MCNC: 12.1 G/DL — SIGNIFICANT CHANGE UP (ref 12–16)
IMM GRANULOCYTES NFR BLD AUTO: 0.2 % — SIGNIFICANT CHANGE UP (ref 0.1–0.3)
INR BLD: 1.05 RATIO — SIGNIFICANT CHANGE UP (ref 0.65–1.3)
LYMPHOCYTES # BLD AUTO: 1.7 K/UL — SIGNIFICANT CHANGE UP (ref 1.2–3.4)
LYMPHOCYTES # BLD AUTO: 31.8 % — SIGNIFICANT CHANGE UP (ref 20.5–51.1)
MAGNESIUM SERPL-MCNC: 2.1 MG/DL — SIGNIFICANT CHANGE UP (ref 1.8–2.4)
MCHC RBC-ENTMCNC: 26 PG — LOW (ref 27–31)
MCHC RBC-ENTMCNC: 32.7 G/DL — SIGNIFICANT CHANGE UP (ref 32–37)
MCV RBC AUTO: 79.4 FL — LOW (ref 81–99)
MONOCYTES # BLD AUTO: 0.64 K/UL — HIGH (ref 0.1–0.6)
MONOCYTES NFR BLD AUTO: 12 % — HIGH (ref 1.7–9.3)
NEUTROPHILS # BLD AUTO: 2.72 K/UL — SIGNIFICANT CHANGE UP (ref 1.4–6.5)
NEUTROPHILS NFR BLD AUTO: 51 % — SIGNIFICANT CHANGE UP (ref 42.2–75.2)
NRBC # BLD: 0 /100 WBCS — SIGNIFICANT CHANGE UP (ref 0–0)
PLATELET # BLD AUTO: 336 K/UL — SIGNIFICANT CHANGE UP (ref 130–400)
POTASSIUM SERPL-MCNC: 4.2 MMOL/L — SIGNIFICANT CHANGE UP (ref 3.5–5)
POTASSIUM SERPL-SCNC: 4.2 MMOL/L — SIGNIFICANT CHANGE UP (ref 3.5–5)
PROT SERPL-MCNC: 8.4 G/DL — HIGH (ref 6–8)
PROTHROM AB SERPL-ACNC: 12 SEC — SIGNIFICANT CHANGE UP (ref 9.95–12.87)
RBC # BLD: 4.66 M/UL — SIGNIFICANT CHANGE UP (ref 4.2–5.4)
RBC # FLD: 14.4 % — SIGNIFICANT CHANGE UP (ref 11.5–14.5)
SODIUM SERPL-SCNC: 137 MMOL/L — SIGNIFICANT CHANGE UP (ref 135–146)
WBC # BLD: 5.34 K/UL — SIGNIFICANT CHANGE UP (ref 4.8–10.8)
WBC # FLD AUTO: 5.34 K/UL — SIGNIFICANT CHANGE UP (ref 4.8–10.8)

## 2022-10-25 PROCEDURE — 99222 1ST HOSP IP/OBS MODERATE 55: CPT

## 2022-10-25 RX ORDER — METHADONE HYDROCHLORIDE 40 MG/1
40 TABLET ORAL ONCE
Refills: 0 | Status: DISCONTINUED | OUTPATIENT
Start: 2022-10-25 | End: 2022-10-25

## 2022-10-25 RX ORDER — METHADONE HYDROCHLORIDE 40 MG/1
190 TABLET ORAL ONCE
Refills: 0 | Status: DISCONTINUED | OUTPATIENT
Start: 2022-10-25 | End: 2022-10-25

## 2022-10-25 RX ORDER — GABAPENTIN 400 MG/1
300 CAPSULE ORAL ONCE
Refills: 0 | Status: COMPLETED | OUTPATIENT
Start: 2022-10-25 | End: 2022-10-25

## 2022-10-25 RX ORDER — ACETAMINOPHEN 500 MG
650 TABLET ORAL EVERY 6 HOURS
Refills: 0 | Status: DISCONTINUED | OUTPATIENT
Start: 2022-10-25 | End: 2022-11-02

## 2022-10-25 RX ORDER — KETOROLAC TROMETHAMINE 30 MG/ML
15 SYRINGE (ML) INJECTION EVERY 6 HOURS
Refills: 0 | Status: DISCONTINUED | OUTPATIENT
Start: 2022-10-25 | End: 2022-10-27

## 2022-10-25 RX ORDER — OXYCODONE HYDROCHLORIDE 5 MG/1
5 TABLET ORAL ONCE
Refills: 0 | Status: DISCONTINUED | OUTPATIENT
Start: 2022-10-25 | End: 2022-10-25

## 2022-10-25 RX ORDER — ALPRAZOLAM 0.25 MG
0.25 TABLET ORAL ONCE
Refills: 0 | Status: DISCONTINUED | OUTPATIENT
Start: 2022-10-25 | End: 2022-10-25

## 2022-10-25 RX ORDER — MORPHINE SULFATE 50 MG/1
4 CAPSULE, EXTENDED RELEASE ORAL ONCE
Refills: 0 | Status: DISCONTINUED | OUTPATIENT
Start: 2022-10-25 | End: 2022-10-25

## 2022-10-25 RX ORDER — ALPRAZOLAM 0.25 MG
2 TABLET ORAL EVERY 8 HOURS
Refills: 0 | Status: DISCONTINUED | OUTPATIENT
Start: 2022-10-25 | End: 2022-11-01

## 2022-10-25 RX ORDER — LANOLIN ALCOHOL/MO/W.PET/CERES
3 CREAM (GRAM) TOPICAL AT BEDTIME
Refills: 0 | Status: DISCONTINUED | OUTPATIENT
Start: 2022-10-25 | End: 2022-11-02

## 2022-10-25 RX ORDER — ONDANSETRON 8 MG/1
4 TABLET, FILM COATED ORAL EVERY 8 HOURS
Refills: 0 | Status: DISCONTINUED | OUTPATIENT
Start: 2022-10-25 | End: 2022-11-02

## 2022-10-25 RX ADMIN — METHADONE HYDROCHLORIDE 40 MILLIGRAM(S): 40 TABLET ORAL at 23:53

## 2022-10-25 RX ADMIN — MORPHINE SULFATE 4 MILLIGRAM(S): 50 CAPSULE, EXTENDED RELEASE ORAL at 09:54

## 2022-10-25 RX ADMIN — Medication 2 MILLIGRAM(S): at 23:35

## 2022-10-25 RX ADMIN — OXYCODONE HYDROCHLORIDE 5 MILLIGRAM(S): 5 TABLET ORAL at 23:53

## 2022-10-25 RX ADMIN — Medication 30 MILLILITER(S): at 19:00

## 2022-10-25 NOTE — H&P ADULT - ATTENDING COMMENTS
# Thoracic vertebral osteomyelitis/discitis   - IR for biopsy  - ESR, CRP and blood cultures  - ID evaluation, may hold abx for biopsy if pt remains clinically stable    # Polysubstance Abuse  # h/o IVDU  # Chronic opioid use on methadone therapy  - resume methadone after verification with methadone clinic    # Anxiety  - c/w Alprazolam 2mg TID PRN for anxiety    # Suspected undiagnosed COPD - c/w albuterol prn for wheezing  # GERD, h/o gastritis - c/w PPI  # HTN - controlled off meds  # HLD - on diet  # Hypothyroidism - resolved, outpatient f/u   # DVT prophylaxis - chemical prophylaxis on hold for possible biopsy with IR  # Code status - Full Code

## 2022-10-25 NOTE — H&P ADULT - NSHPLABSRESULTS_GEN_ALL_CORE
12.1   7.15  )-----------( 413      ( 24 Oct 2022 20:51 )             36.7       10-24    138  |  98  |  9<L>  ----------------------------<  82  4.8   |  26  |  0.8    Ca    9.4      24 Oct 2022 20:51    TPro  8.4<H>  /  Alb  4.3  /  TBili  0.2  /  DBili  x   /  AST  35  /  ALT  23  /  AlkPhos  106  10-24                      Lactate Trend  10-24 @ 20:51 Lactate:1.5             CAPILLARY BLOOD GLUCOSE            Culture Results:   No Growth Final (10-15 @ 04:30)  Culture Results:   No Growth Final (10-13 @ 09:02)  Culture Results:   No Growth Final (10-13 @ 09:02)  Culture Results:   <10,000 CFU/mL Normal Urogenital Glenna (10-13 @ 01:20)

## 2022-10-25 NOTE — PATIENT PROFILE ADULT - FUNCTIONAL ASSESSMENT - DAILY ACTIVITY 4.
[address]   Cardiac Followup    Referring Provider:  Inna Solis     Chief Complaint   Patient presents with    6 Month Follow-Up    Shortness of Breath      Subjective: Mr Kwame Pink presents today for cardiology follow up of CAD s/p stents, hx CABG, HTN, HLD; no complaints     History of Present Illness:    Mr. Kwame Pink is a 59yo male with hx of CABG. He also has history of CAD, HTN, and hyperlipidemia. He is s/p LAD and OM stent with PTCA only of the diagonal branch in 2003. On 5/12/09 he underwent repeat intervention with ALLYN of the OM branch overlapping a prior placed stent in 2003. His EF was vigorous and estimated at 70% per LVG. His small nondominant RCA was subtotally occluded, which was unchanged from 2003. Note cath on 9/30/11 for c/o new-onset exertional chest pain during office visit 9/23/11. Cath showed widely patent stents previously placed to the left anterior descending stent and obtuse marginal branches. There is mild diffuse disease noted throughout, but no critical focal stenoses. The right coronary artery is nondominant and has severe mid-vessel disease, but this is unchanged from May 2009 study. There are some L-R collaterals to the distal RCA noted as well. Most recent 11/22/2013 [employer] [facility] normal myocardial perfusion study. Hyperdynamic LV systolic function with TL>01% with uniform wall motion and normal perfusion with EF=71%. Due to exertional CP and SOB symptoms I referred him for [address] on 3/17/17 which showed 70-80% LAD, 99% D1, 70% OM1, 90% OM2, 100% mid RCA; normal LVEF; normal hemodynamics. Currently, hemodynamically stable. Referred for surgical revascularization and on 3/21/17 he underwent 4V CABG by Dr. Sanders Officer with LIMA to LAD and reverse SVG to ramus OM distal to the stent and PDA. Most recent  carotid Duplex showed 3/20/17 showed minimal plaque <50% bilaterally. Note ECHO on 6/63/39 showed LV systolic function is normal EF=60-65%;  Grade I diastolic dysfunction with normal filling pressure. Mild aortic stenosis (AV velocity=2.23m/s with mean pressure gradient=10mmHg). Note EKG 3/17/17 showed NSR 70bpm.    Most recent stress test 7/20/17 ABNORMAL HIGH risk stress test; LVEF of  71%. There is a large severe defect in the anterior and  anterolateral walls at stress that completely reversible with rest consistent with ischemia. He underwent RHC, LHC, LVG on 7/5/17 showing severe disease left and right coronary systems with patent LIMA graft but disease of remaining grafts and one occluded. Subsequently, he underwent most recent [facility] 8/7/17 PCI Complex LAD, diag Rt groin and on again 8/9/17 had PCI of complex circumflex, RCA  with Dr. Tyler Diaz at [facility]. Amlodipine was discontinued by Dr. Tyler Diaz. Most recent Limited ECHO 3/3/00 showed LV systolic function was normal. He states his Plavix has been increased to 150 mg daily by Dr. Tl Almeida at [facility] (he went for 2nd opinion) based on genetic testing results. His blood work showed systemic inflammation with elevated CRP,  lipoprotein A, ADP response 42,.indidicating a lack of full response. At this time plavix was increased and niacin was added. Imdur was increased 12/18/17 to 60 mg bid. Today he reports feeling \"great\" overall. He is able to perform daily activities with no difficulty. He recently did significant yard work (weeding) and tolerated it well. Since last visit he underwent LHC at [facility] per Dr. Tyler Diaz on 1/20/18 which showed LAD: Mid-vessel lesion 50% in-stent restenosis, Proximal vessel lesion: 70% stenosis, 1st diagonal: Proximal vessel lesion: 100% in-stent stenosis, Right coronary: Mid-vessel [id number]% stenosis; patent LIMA-LAD; patent small SVG-small distal RCA branch; patent SVG to the lateral 1st obtuse marginal, from the aorta: Proximal anastomosis lesion: 60% stenosis, Distal anastomosis lesion: 60% stenosis. No PCI placed. EECP therapy discussed with patient but doing well medically. oriented in all spheres  · Moves all extremities well  · Exhibits normal gait balance and coordination  · No abnormalities of mood, affect, memory, mentation, or behavior are noted    GXT stress Test 7/20/17   ABNORMAL HIGH risk stress test  Normal LV size and systolic function. Left ventricular ejection fraction of  71%. Normal wall motion. There is a large severe defect in the anterior and  anterolateral walls at stress that completely reveres with rest consistent  with ischemia. Lab Results   Component Value Date    CHOL 156 07/05/2017    CHOL 148 03/21/2017    CHOL 164 03/17/2017     Lab Results   Component Value Date    TRIG 152 (H) 07/05/2017    TRIG 129 03/21/2017    TRIG 60 03/17/2017     Lab Results   Component Value Date    HDL 52 07/05/2017    HDL 63 (H) 03/21/2017    HDL 65 (H) 03/17/2017     Lab Results   Component Value Date    LDLCALC 74 07/05/2017    LDLCALC 59 03/21/2017    LDLCALC 87 03/17/2017     Lab Results   Component Value Date    LABVLDL 30 07/05/2017    LABVLDL 26 03/21/2017    LABVLDL 12 03/17/2017 6/2018 NAb 137, BUN/Creat 22/0.96, Pot 5.0    Assessment:     1. CAD (coronary artery disease):  Due to exertional CP and SOB symptoms I referred him for LHC on 3/17/17 which showed multi-vessel CAD. Referred for surgical revascularization and on 3/21/17 he underwent 4V CABG by Dr. Steve Abrams with LIMA to LAD and reverse SVG to ramus OM distal to the stent and PDA. Most recent LHC at [facility] per Dr. Niki Prince on 1/20/18 which showed LAD: Mid-vessel lesion 50% in-stent restenosis, Proximal vessel lesion: 70% stenosis, 1st diagonal: Proximal vessel lesion: 100% in-stent stenosis, Right coronary: Mid-vessel [id number]% stenosis; patent LIMA-LAD; patent small SVG-small distal RCA branch; patent SVG to the lateral 1st obtuse marginal, from the aorta: Proximal anastomosis lesion: 60% stenosis, Distal anastomosis lesion: 60% stenosis. No PCI placed.  EECP therapy discussed with patient but 4 = No assist / stand by assistance

## 2022-10-25 NOTE — H&P ADULT - NSHPREVIEWOFSYSTEMS_GEN_ALL_CORE
REVIEW OF SYSTEMS:    CONSTITUTIONAL:  +fevers  EYES/ENT:  No visual changes  NECK:  No pain or stiffness  RESPIRATORY:  No cough, wheezing; No shortness of breath  CARDIOVASCULAR:  No chest pain   GASTROINTESTINAL:  + epigastric pain. No nausea, vomiting. No diarrhea or constipation.   MUSCULOSKELETAL:  FROM all extremities, normal strength  NEUROLOGICAL:  No numbness or weakness  SKIN:  No itching, rashes

## 2022-10-25 NOTE — H&P ADULT - HISTORY OF PRESENT ILLNESS
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 Patient was admitted with the same issue and left AMA on 10/19. Pt reports the pain is sharp, intermittent, 9/10 in severity radiates across the back and epigastric area. Pt reports the pain persists and denies eliciting factors (eating, physical exertion). Pt reports fever two days ago and today. No n/v, trauma.     In the ED  · BP Systolic	135 mm Hg  · BP Diastolic	76 mm Hg  · Heart Rate	64 /min  · Respiration Rate (breaths/min)	18 /min  · Temp (F)	97.2 Degrees F  · Temp (C)	36.2 Degrees C  · Temp site	oral  · SpO2 (%)	99 %  · O2 Delivery/Oxygen Delivery Method	room air      CT abdomen/pelvis: Inferior T7 and superior T8 endplate erosive changes with adjacent soft tissue prominence suspicious for an infectious discitis/osteomyelitis osteomyelitis.T10 superior endplate Schmorl's node. 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 Patient was admitted with the same issue and left AMA on 10/19. Pt reports the pain is sharp, intermittent, 9/10 in severity radiates across the back and epigastric area. Pt reports the pain persists and denies eliciting factors (eating, physical exertion). Pt reports fever two days ago and today. No n/v, trauma.     In the ED  · BP Systolic	135 mm Hg  · BP Diastolic	76 mm Hg  · Heart Rate	64 /min  · Respiration Rate (breaths/min)	18 /min  · Temp (F)	97.2 Degrees F  · Temp (C)	36.2 Degrees C  · Temp site	oral  · SpO2 (%)	99 %  · O2 Delivery/Oxygen Delivery Method	room air      From previous admission CT abdomen/pelvis: Inferior T7 and superior T8 endplate erosive changes with adjacent soft tissue prominence suspicious for an infectious discitis/osteomyelitis osteomyelitis.T10 superior endplate Schmorl's node. This has a benign appearance

## 2022-10-25 NOTE — PATIENT PROFILE ADULT - HOME ACCESSIBILITY CONCERNS
Bedside and Verbal shift change report given to [address] Facundo RN (oncoming nurse) by Lance Zuñiga RN (offgoing nurse). Report included the following information SBAR, Kardex, MAR, Accordion and Recent Results. none

## 2022-10-25 NOTE — H&P ADULT - NSHPPHYSICALEXAM_GEN_ALL_CORE
T(C): 37.1 (10-25-22 @ 00:27), Max: 37.1 (10-25-22 @ 00:27)  HR: 73 (10-25-22 @ 00:27) (64 - 73)  BP: 112/57 (10-25-22 @ 00:27) (112/57 - 135/76)  RR: 18 (10-25-22 @ 00:27) (18 - 18)  SpO2: 97% (10-25-22 @ 00:27) (97% - 99%)    PHYSICAL EXAM:  GENERAL: In pain, fatigued, well-developed  HEAD:  Atraumatic, Normocephalic  EYES: conjunctiva and sclera clear  NECK: Supple  CHEST/LUNG: Clear to auscultation bilaterally; No wheeze  HEART: Regular rate and rhythm  ABDOMEN: Soft, Nontender, Nondistended  EXTREMITIES: No clubbing, cyanosis, or edema  PSYCH: AAOx3  NEUROLOGY: non-focal  SKIN: No rashes or lesions

## 2022-10-25 NOTE — CONSULT NOTE ADULT - ASSESSMENT
ASSESSMENT  43 y/o F pt w/ PMH/o HTN, HLD, GERD, HCV, remote IVDU, Opioid abuse, active smoker, hypothyroidism represents to the ED w/ RT sided epigastric pain radiating across to her mid-back, MRI concerning for thoracic discitis, left AMA on 10/19, now returning with the same complaint    IMPRESSION  #Thoracic T7-8 discitis  No fever  No leukocytosis  hx trauma 2 weeks ago  remote hx IVDU, denies any recent use    10/15 BCX NGTD     10/13 BCX NG x2    Quantiferon TB Plus: NEGATIVE (10.18.22 @ 09:01)    Sedimentation Rate, Erythrocyte: 74 mm/Hr (10-15-22 @ 04:30)    C-Reactive Protein, Serum: 10.3 mg/L (10-15-22 @ 04:30)  < from: MR Thoracic Spine w/wo IV Cont (10.14.22 @ 16:44) >  1.  Findings compatible with T7-8 discitis osteomyelitis, with erosive changes across the opposing endplates (greater at T7), and edema and enhancement within the vertebral bodies and intervertebral discs.  2.  Enhancing inflammatory soft tissue within the paraspinal space surrounding the T7 and T8 vertebral bodies, within the T7-8 neural   foramen greater on the right and within the ventral and lateral epidural space greater on the right.  3.  No evidence of spinal cord compression or edema.  < from: CT Abdomen and Pelvis w/ IV Cont (10.13.22 @ 05:33) >  Inferior T7 and superior T8 endplate erosive changes with adjacent soft tissue prominence suspicious for an infectious discitis/osteomyelitis osteomyelitis.  #Sepsis ruled out on admission  #MMTP   #HCV      RECOMMENDATIONS  This is a preliminary incomplete pended note, all final recommendations to follow after interview and examination of the patient.   - UTOX  - TTE  - Monitor off antibiotics  - ESR, CRP   - Recommend IR biopsy- Histopathology, G/S & Culture  - HIV Ab/Ag, HCV Ab    If any questions, please call or send a message on [employer] Teams  Please continue to update ID with any pertinent new laboratory or radiographic findings  Spectra [phone]  ASSESSMENT  41 y/o F pt w/ PMH/o HTN, HLD, GERD, HCV, remote IVDU, Opioid abuse, active smoker, hypothyroidism represents to the ED w/ RT sided epigastric pain radiating across to her mid-back, MRI concerning for thoracic discitis, left AMA on 10/19, now returning with the same complaint    IMPRESSION  #Thoracic T7-8 discitis  No fever  No leukocytosis  hx trauma 2 weeks ago  remote hx IVDU, denies any recent use    10/15 BCX NGTD     10/13 BCX NG x2    Quantiferon TB Plus: NEGATIVE (10.18.22 @ 09:01)    Sedimentation Rate, Erythrocyte: 74 mm/Hr (10-15-22 @ 04:30)    C-Reactive Protein, Serum: 10.3 mg/L (10-15-22 @ 04:30)  < from: MR Thoracic Spine w/wo IV Cont (10.14.22 @ 16:44) >  1.  Findings compatible with T7-8 discitis osteomyelitis, with erosive changes across the opposing endplates (greater at T7), and edema and enhancement within the vertebral bodies and intervertebral discs.  2.  Enhancing inflammatory soft tissue within the paraspinal space surrounding the T7 and T8 vertebral bodies, within the T7-8 neural   foramen greater on the right and within the ventral and lateral epidural space greater on the right.  3.  No evidence of spinal cord compression or edema.  < from: CT Abdomen and Pelvis w/ IV Cont (10.13.22 @ 05:33) >  Inferior T7 and superior T8 endplate erosive changes with adjacent soft tissue prominence suspicious for an infectious discitis/osteomyelitis osteomyelitis.  #Sepsis ruled out on admission  #MMTP   #HCV      RECOMMENDATIONS  - UTOX  - TTE  - Monitor off antibiotics  - ESR, CRP   - Recommend IR biopsy- Histopathology, G/S & Culture  - HIV Ab/Ag, HCV Ab  - Pt does not appear to be very reliable - previously left AMA, currently nowhere to be found in the ER (confirmed with nursing that she is not at a test). Later I saw her walking outside the hospital coming from [facility]. Need to determine if she is a good candidate for a PICC line.    If any questions, please call or send a message on [employer] Teams  Please continue to update ID with any pertinent new laboratory or radiographic findings  Spectra [phone]

## 2022-10-25 NOTE — H&P ADULT - SOCIAL HISTORY: SUBSTANCE USE
hx of IVDU heroin about 7 years ago  hx of opioid abuse  currently taking 190mg of methadone at clinic

## 2022-10-25 NOTE — PATIENT PROFILE ADULT - NSPRESCRUSEDDRG_GEN_A_NUR
Pt states Dr. Herrera tried to call him regarding his blood work. Spoke with Dr. Herrera, flow cytometry shows pt is negative for lymphoma cells. Informed pt. Pt v/u  
patient smokes marijuana and admits to sniffing cocaine 4 weeks ago/Yes
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
## 2022-10-26 LAB
ALBUMIN SERPL ELPH-MCNC: 4 G/DL — SIGNIFICANT CHANGE UP (ref 3.5–5.2)
ALP SERPL-CCNC: 99 U/L — SIGNIFICANT CHANGE UP (ref 30–115)
ALT FLD-CCNC: 21 U/L — SIGNIFICANT CHANGE UP (ref 0–41)
AMPHET UR-MCNC: NEGATIVE — SIGNIFICANT CHANGE UP
ANION GAP SERPL CALC-SCNC: 10 MMOL/L — SIGNIFICANT CHANGE UP (ref 7–14)
AST SERPL-CCNC: 25 U/L — SIGNIFICANT CHANGE UP (ref 0–41)
BARBITURATES UR SCN-MCNC: NEGATIVE — SIGNIFICANT CHANGE UP
BENZODIAZ UR-MCNC: POSITIVE
BILIRUB DIRECT SERPL-MCNC: <0.2 MG/DL — SIGNIFICANT CHANGE UP (ref 0–0.3)
BILIRUB INDIRECT FLD-MCNC: >0 MG/DL — LOW (ref 0.2–1.2)
BILIRUB SERPL-MCNC: 0.2 MG/DL — SIGNIFICANT CHANGE UP (ref 0.2–1.2)
BUN SERPL-MCNC: 8 MG/DL — LOW (ref 10–20)
CALCIUM SERPL-MCNC: 9.2 MG/DL — SIGNIFICANT CHANGE UP (ref 8.4–10.5)
CHLORIDE SERPL-SCNC: 102 MMOL/L — SIGNIFICANT CHANGE UP (ref 98–110)
CO2 SERPL-SCNC: 26 MMOL/L — SIGNIFICANT CHANGE UP (ref 17–32)
COCAINE METAB.OTHER UR-MCNC: POSITIVE
CREAT SERPL-MCNC: 0.8 MG/DL — SIGNIFICANT CHANGE UP (ref 0.7–1.5)
CRP SERPL-MCNC: 5.7 MG/L — HIGH
EGFR: 95 ML/MIN/1.73M2 — SIGNIFICANT CHANGE UP
ERYTHROCYTE [SEDIMENTATION RATE] IN BLOOD: 60 MM/HR — HIGH (ref 0–20)
GLUCOSE SERPL-MCNC: 84 MG/DL — SIGNIFICANT CHANGE UP (ref 70–99)
HCT VFR BLD CALC: 37.6 % — SIGNIFICANT CHANGE UP (ref 37–47)
HCV AB S/CO SERPL IA: 61.59 COI — HIGH
HCV AB SERPL-IMP: REACTIVE
HGB BLD-MCNC: 12.4 G/DL — SIGNIFICANT CHANGE UP (ref 12–16)
HIV 1+2 AB+HIV1 P24 AG SERPL QL IA: SIGNIFICANT CHANGE UP
MAGNESIUM SERPL-MCNC: 2 MG/DL — SIGNIFICANT CHANGE UP (ref 1.8–2.4)
MCHC RBC-ENTMCNC: 26.1 PG — LOW (ref 27–31)
MCHC RBC-ENTMCNC: 33 G/DL — SIGNIFICANT CHANGE UP (ref 32–37)
MCV RBC AUTO: 79 FL — LOW (ref 81–99)
METHADONE UR-MCNC: POSITIVE
NRBC # BLD: 0 /100 WBCS — SIGNIFICANT CHANGE UP (ref 0–0)
OPIATES UR-MCNC: POSITIVE
PCP SPEC-MCNC: SIGNIFICANT CHANGE UP
PLATELET # BLD AUTO: 325 K/UL — SIGNIFICANT CHANGE UP (ref 130–400)
POTASSIUM SERPL-MCNC: 4.2 MMOL/L — SIGNIFICANT CHANGE UP (ref 3.5–5)
POTASSIUM SERPL-SCNC: 4.2 MMOL/L — SIGNIFICANT CHANGE UP (ref 3.5–5)
PROPOXYPHENE QUALITATIVE URINE RESULT: NEGATIVE — SIGNIFICANT CHANGE UP
PROT SERPL-MCNC: 7.7 G/DL — SIGNIFICANT CHANGE UP (ref 6–8)
RBC # BLD: 4.76 M/UL — SIGNIFICANT CHANGE UP (ref 4.2–5.4)
RBC # FLD: 14 % — SIGNIFICANT CHANGE UP (ref 11.5–14.5)
SODIUM SERPL-SCNC: 138 MMOL/L — SIGNIFICANT CHANGE UP (ref 135–146)
WBC # BLD: 6.68 K/UL — SIGNIFICANT CHANGE UP (ref 4.8–10.8)
WBC # FLD AUTO: 6.68 K/UL — SIGNIFICANT CHANGE UP (ref 4.8–10.8)

## 2022-10-26 PROCEDURE — 99233 SBSQ HOSP IP/OBS HIGH 50: CPT

## 2022-10-26 PROCEDURE — 93306 TTE W/DOPPLER COMPLETE: CPT | Mod: 26

## 2022-10-26 PROCEDURE — 93010 ELECTROCARDIOGRAM REPORT: CPT

## 2022-10-26 RX ORDER — ENOXAPARIN SODIUM 100 MG/ML
40 INJECTION SUBCUTANEOUS EVERY 24 HOURS
Refills: 0 | Status: COMPLETED | OUTPATIENT
Start: 2022-10-26 | End: 2022-10-27

## 2022-10-26 RX ORDER — MORPHINE SULFATE 50 MG/1
4 CAPSULE, EXTENDED RELEASE ORAL EVERY 8 HOURS
Refills: 0 | Status: DISCONTINUED | OUTPATIENT
Start: 2022-10-26 | End: 2022-10-27

## 2022-10-26 RX ORDER — METHADONE HYDROCHLORIDE 40 MG/1
190 TABLET ORAL DAILY
Refills: 0 | Status: DISCONTINUED | OUTPATIENT
Start: 2022-10-26 | End: 2022-11-02

## 2022-10-26 RX ADMIN — METHADONE HYDROCHLORIDE 190 MILLIGRAM(S): 40 TABLET ORAL at 10:24

## 2022-10-26 RX ADMIN — MORPHINE SULFATE 4 MILLIGRAM(S): 50 CAPSULE, EXTENDED RELEASE ORAL at 15:07

## 2022-10-26 RX ADMIN — Medication 15 MILLIGRAM(S): at 09:17

## 2022-10-26 RX ADMIN — OXYCODONE HYDROCHLORIDE 5 MILLIGRAM(S): 5 TABLET ORAL at 00:30

## 2022-10-26 RX ADMIN — ENOXAPARIN SODIUM 40 MILLIGRAM(S): 100 INJECTION SUBCUTANEOUS at 16:15

## 2022-10-26 RX ADMIN — Medication 15 MILLIGRAM(S): at 08:47

## 2022-10-26 RX ADMIN — MORPHINE SULFATE 4 MILLIGRAM(S): 50 CAPSULE, EXTENDED RELEASE ORAL at 15:37

## 2022-10-26 RX ADMIN — Medication 2 MILLIGRAM(S): at 08:47
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
## 2022-10-27 ENCOUNTER — TRANSCRIPTION ENCOUNTER (OUTPATIENT)
Age: 42
End: 2022-10-27

## 2022-10-27 DIAGNOSIS — M46.20 OSTEOMYELITIS OF VERTEBRA, SITE UNSPECIFIED: ICD-10-CM

## 2022-10-27 LAB
ALBUMIN SERPL ELPH-MCNC: 3.7 G/DL — SIGNIFICANT CHANGE UP (ref 3.5–5.2)
ALP SERPL-CCNC: 112 U/L — SIGNIFICANT CHANGE UP (ref 30–115)
ALT FLD-CCNC: 25 U/L — SIGNIFICANT CHANGE UP (ref 0–41)
ANION GAP SERPL CALC-SCNC: 14 MMOL/L — SIGNIFICANT CHANGE UP (ref 7–14)
AST SERPL-CCNC: 32 U/L — SIGNIFICANT CHANGE UP (ref 0–41)
BASOPHILS # BLD AUTO: 0.06 K/UL — SIGNIFICANT CHANGE UP (ref 0–0.2)
BASOPHILS NFR BLD AUTO: 1 % — SIGNIFICANT CHANGE UP (ref 0–1)
BILIRUB SERPL-MCNC: <0.2 MG/DL — SIGNIFICANT CHANGE UP (ref 0.2–1.2)
BUN SERPL-MCNC: 14 MG/DL — SIGNIFICANT CHANGE UP (ref 10–20)
CALCIUM SERPL-MCNC: 9.2 MG/DL — SIGNIFICANT CHANGE UP (ref 8.4–10.5)
CHLORIDE SERPL-SCNC: 100 MMOL/L — SIGNIFICANT CHANGE UP (ref 98–110)
CO2 SERPL-SCNC: 24 MMOL/L — SIGNIFICANT CHANGE UP (ref 17–32)
CREAT SERPL-MCNC: 1 MG/DL — SIGNIFICANT CHANGE UP (ref 0.7–1.5)
EGFR: 73 ML/MIN/1.73M2 — SIGNIFICANT CHANGE UP
EOSINOPHIL # BLD AUTO: 0.43 K/UL — SIGNIFICANT CHANGE UP (ref 0–0.7)
EOSINOPHIL NFR BLD AUTO: 7.3 % — SIGNIFICANT CHANGE UP (ref 0–8)
GLUCOSE SERPL-MCNC: 85 MG/DL — SIGNIFICANT CHANGE UP (ref 70–99)
HCT VFR BLD CALC: 41 % — SIGNIFICANT CHANGE UP (ref 37–47)
HCV RNA FLD QL NAA+PROBE: SIGNIFICANT CHANGE UP
HCV RNA SPEC QL PROBE+SIG AMP: SIGNIFICANT CHANGE UP
HGB BLD-MCNC: 13 G/DL — SIGNIFICANT CHANGE UP (ref 12–16)
IMM GRANULOCYTES NFR BLD AUTO: 0.3 % — SIGNIFICANT CHANGE UP (ref 0.1–0.3)
INR BLD: 0.95 RATIO — SIGNIFICANT CHANGE UP (ref 0.65–1.3)
LYMPHOCYTES # BLD AUTO: 2.49 K/UL — SIGNIFICANT CHANGE UP (ref 1.2–3.4)
LYMPHOCYTES # BLD AUTO: 42.3 % — SIGNIFICANT CHANGE UP (ref 20.5–51.1)
MAGNESIUM SERPL-MCNC: 2 MG/DL — SIGNIFICANT CHANGE UP (ref 1.8–2.4)
MCHC RBC-ENTMCNC: 25.9 PG — LOW (ref 27–31)
MCHC RBC-ENTMCNC: 31.7 G/DL — LOW (ref 32–37)
MCV RBC AUTO: 81.7 FL — SIGNIFICANT CHANGE UP (ref 81–99)
MONOCYTES # BLD AUTO: 0.88 K/UL — HIGH (ref 0.1–0.6)
MONOCYTES NFR BLD AUTO: 14.9 % — HIGH (ref 1.7–9.3)
NEUTROPHILS # BLD AUTO: 2.01 K/UL — SIGNIFICANT CHANGE UP (ref 1.4–6.5)
NEUTROPHILS NFR BLD AUTO: 34.2 % — LOW (ref 42.2–75.2)
NRBC # BLD: 0 /100 WBCS — SIGNIFICANT CHANGE UP (ref 0–0)
PLATELET # BLD AUTO: 339 K/UL — SIGNIFICANT CHANGE UP (ref 130–400)
POTASSIUM SERPL-MCNC: 4.9 MMOL/L — SIGNIFICANT CHANGE UP (ref 3.5–5)
POTASSIUM SERPL-SCNC: 4.9 MMOL/L — SIGNIFICANT CHANGE UP (ref 3.5–5)
PROT SERPL-MCNC: 7.7 G/DL — SIGNIFICANT CHANGE UP (ref 6–8)
PROTHROM AB SERPL-ACNC: 10.8 SEC — SIGNIFICANT CHANGE UP (ref 9.95–12.87)
RBC # BLD: 5.02 M/UL — SIGNIFICANT CHANGE UP (ref 4.2–5.4)
RBC # FLD: 14.1 % — SIGNIFICANT CHANGE UP (ref 11.5–14.5)
SODIUM SERPL-SCNC: 138 MMOL/L — SIGNIFICANT CHANGE UP (ref 135–146)
WBC # BLD: 5.89 K/UL — SIGNIFICANT CHANGE UP (ref 4.8–10.8)
WBC # FLD AUTO: 5.89 K/UL — SIGNIFICANT CHANGE UP (ref 4.8–10.8)

## 2022-10-27 PROCEDURE — 99233 SBSQ HOSP IP/OBS HIGH 50: CPT

## 2022-10-27 RX ADMIN — Medication 2 MILLIGRAM(S): at 05:39

## 2022-10-27 RX ADMIN — MORPHINE SULFATE 4 MILLIGRAM(S): 50 CAPSULE, EXTENDED RELEASE ORAL at 05:39

## 2022-10-27 RX ADMIN — MORPHINE SULFATE 4 MILLIGRAM(S): 50 CAPSULE, EXTENDED RELEASE ORAL at 16:04

## 2022-10-27 RX ADMIN — MORPHINE SULFATE 4 MILLIGRAM(S): 50 CAPSULE, EXTENDED RELEASE ORAL at 15:34

## 2022-10-27 RX ADMIN — ENOXAPARIN SODIUM 40 MILLIGRAM(S): 100 INJECTION SUBCUTANEOUS at 15:34

## 2022-10-27 RX ADMIN — METHADONE HYDROCHLORIDE 190 MILLIGRAM(S): 40 TABLET ORAL at 12:12

## 2022-10-27 RX ADMIN — Medication 15 MILLIGRAM(S): at 23:05

## 2022-10-27 RX ADMIN — MORPHINE SULFATE 4 MILLIGRAM(S): 50 CAPSULE, EXTENDED RELEASE ORAL at 06:20

## 2022-10-27 RX ADMIN — Medication 2 MILLIGRAM(S): at 23:09
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
## 2022-10-27 NOTE — DISCHARGE NOTE NURSING/CASE MANAGEMENT/SOCIAL WORK - NSDCPEFALRISK_GEN_ALL_CORE
For information on Fall & Injury Prevention, visit: [email or web address] OR  [email or web address] OR  https://www.cdc.gov/steadi/patient.html

## 2022-10-27 NOTE — DISCHARGE NOTE NURSING/CASE MANAGEMENT/SOCIAL WORK - PATIENT PORTAL LINK FT
You can access the FollowMyHealth Patient Portal offered by [facility] by registering at the following website: [email or web address]. By joining [facility]’s FollowMyHealth portal, you will also be able to view your health information using other applications (apps) compatible with our system.

## 2022-10-27 NOTE — CONSULT NOTE ADULT - ASSESSMENT
Patient is a 43 y/o woman with history of HTN, HL, GERD, opioid use disorder on methadone for MAT, HCV, and chronic low back pain who was admitted on 10/24/2022 with back pain radiating to the epigastric region after she was recently admitted from 10/13 - 10/19 with osteomyelitis of the thoracic spine.

## 2022-10-27 NOTE — CONSULT NOTE ADULT - PROBLEM SELECTOR RECOMMENDATION 9
Longstanding history of chronic opioid therapy, likely to have significant opioid tolerance. High risk of opioid abuse per ORT.  1) Continue methadone 190mg daily  2) Continue morphine IV 4mg Q8h prn  3) When closer to discharge, can stop morphine IV and start oxycodone 10mg Q6h prn; would not discharge with >5 day supply  4) Continue ketorolac; may transition to diclofenac 50mg TID standing  5) Use caution with co-administration of opioids and benzodiazepines 2/2 risk of respiratory depression

## 2022-10-28 LAB
6-ACETYLMORPHINE, UR RESULT: NEGATIVE NG/ML — SIGNIFICANT CHANGE UP
6MAM UR CFM-MCNC: NEGATIVE NG/ML — SIGNIFICANT CHANGE UP
ALBUMIN SERPL ELPH-MCNC: 4 G/DL — SIGNIFICANT CHANGE UP (ref 3.5–5.2)
ALP SERPL-CCNC: 107 U/L — SIGNIFICANT CHANGE UP (ref 30–115)
ALT FLD-CCNC: 26 U/L — SIGNIFICANT CHANGE UP (ref 0–41)
ANION GAP SERPL CALC-SCNC: 13 MMOL/L — SIGNIFICANT CHANGE UP (ref 7–14)
APTT BLD: 37.9 SEC — SIGNIFICANT CHANGE UP (ref 27–39.2)
AST SERPL-CCNC: 27 U/L — SIGNIFICANT CHANGE UP (ref 0–41)
BENZOYLEGONINE, UR RESULT: SIGNIFICANT CHANGE UP NG/ML
BILIRUB SERPL-MCNC: <0.2 MG/DL — SIGNIFICANT CHANGE UP (ref 0.2–1.2)
BLD GP AB SCN SERPL QL: SIGNIFICANT CHANGE UP
BUN SERPL-MCNC: 15 MG/DL — SIGNIFICANT CHANGE UP (ref 10–20)
BZE UR QL SCN: SIGNIFICANT CHANGE UP NG/ML
CALCIUM SERPL-MCNC: 9.4 MG/DL — SIGNIFICANT CHANGE UP (ref 8.4–10.5)
CHLORIDE SERPL-SCNC: 100 MMOL/L — SIGNIFICANT CHANGE UP (ref 98–110)
CO2 SERPL-SCNC: 28 MMOL/L — SIGNIFICANT CHANGE UP (ref 17–32)
COCAINE IN-HOUSE INTERPRETATION: POSITIVE
COCAINE UR QL SCN: POSITIVE
CODEINE UR CFM-MCNC: NEGATIVE NG/ML — SIGNIFICANT CHANGE UP
CODEINE, UR RESULT: NEGATIVE NG/ML — SIGNIFICANT CHANGE UP
CREAT SERPL-MCNC: 1.1 MG/DL — SIGNIFICANT CHANGE UP (ref 0.7–1.5)
EDDP UR QL CFM: SIGNIFICANT CHANGE UP NG/ML
EDDP, UR RESULT: SIGNIFICANT CHANGE UP NG/ML
EGFR: 65 ML/MIN/1.73M2 — SIGNIFICANT CHANGE UP
GLUCOSE SERPL-MCNC: 68 MG/DL — LOW (ref 70–99)
HCV RNA FLD QL NAA+PROBE: SIGNIFICANT CHANGE UP
HCV RNA SPEC NAA+PROBE-LOG IU: SIGNIFICANT CHANGE UP IU/ML
HCV RNA SPEC NAA+PROBE-LOG IU: SIGNIFICANT CHANGE UP LOGIU/ML
HCV RNA SPEC QL PROBE+SIG AMP: SIGNIFICANT CHANGE UP
HYDROCODONE UR QL CFM: NEGATIVE NG/ML — SIGNIFICANT CHANGE UP
HYDROCODONE, UR RESULT: NEGATIVE NG/ML — SIGNIFICANT CHANGE UP
HYDROMORPHONE UR QL CFM: NEGATIVE NG/ML — SIGNIFICANT CHANGE UP
HYDROMORPHONE, UR RESULT: NEGATIVE NG/ML — SIGNIFICANT CHANGE UP
MAGNESIUM SERPL-MCNC: 1.9 MG/DL — SIGNIFICANT CHANGE UP (ref 1.8–2.4)
METHADONE IN-HOUSE INTERPRETATION: POSITIVE
METHADONE UR CFM-MCNC: POSITIVE
MORPHINE UR QL CFM: 1624 NG/ML — SIGNIFICANT CHANGE UP
MORPHINE, UR RESULT: 1624 NG/ML — SIGNIFICANT CHANGE UP
NOROXYCODONE (OPIATES), UR RESULT: 827 NG/ML — SIGNIFICANT CHANGE UP
NOROXYCODONE UR CFM-MCNC: 827 NG/ML — SIGNIFICANT CHANGE UP
OPIATES IN-HOUSE INTERPRETATION: POSITIVE
OPIATES UR QL CFM: POSITIVE
OXYCODONE (OPIATES), UR RESULT: 100 NG/ML — SIGNIFICANT CHANGE UP
OXYCODONE UR-MCNC: 100 NG/ML — SIGNIFICANT CHANGE UP
OXYMORPHONE (OPIATES), UR RESULT: NEGATIVE NG/ML — SIGNIFICANT CHANGE UP
OXYMORPHONE UR CFM-MCNC: NEGATIVE NG/ML — SIGNIFICANT CHANGE UP
POTASSIUM SERPL-MCNC: 4.6 MMOL/L — SIGNIFICANT CHANGE UP (ref 3.5–5)
POTASSIUM SERPL-SCNC: 4.6 MMOL/L — SIGNIFICANT CHANGE UP (ref 3.5–5)
PROT SERPL-MCNC: 8.1 G/DL — HIGH (ref 6–8)
SODIUM SERPL-SCNC: 141 MMOL/L — SIGNIFICANT CHANGE UP (ref 135–146)

## 2022-10-28 PROCEDURE — 62267 INTERDISCAL PERQ ASPIR DX: CPT

## 2022-10-28 PROCEDURE — 99233 SBSQ HOSP IP/OBS HIGH 50: CPT

## 2022-10-28 PROCEDURE — 77012 CT SCAN FOR NEEDLE BIOPSY: CPT | Mod: 26

## 2022-10-28 RX ORDER — ENOXAPARIN SODIUM 100 MG/ML
40 INJECTION SUBCUTANEOUS EVERY 24 HOURS
Refills: 0 | Status: DISCONTINUED | OUTPATIENT
Start: 2022-10-28 | End: 2022-11-02

## 2022-10-28 RX ORDER — VANCOMYCIN HCL 1 G
VIAL (EA) INTRAVENOUS
Refills: 0 | Status: DISCONTINUED | OUTPATIENT
Start: 2022-10-28 | End: 2022-10-30

## 2022-10-28 RX ORDER — CEFTRIAXONE 500 MG/1
2000 INJECTION, POWDER, FOR SOLUTION INTRAMUSCULAR; INTRAVENOUS EVERY 24 HOURS
Refills: 0 | Status: DISCONTINUED | OUTPATIENT
Start: 2022-10-28 | End: 2022-11-02

## 2022-10-28 RX ORDER — VANCOMYCIN HCL 1 G
1250 VIAL (EA) INTRAVENOUS ONCE
Refills: 0 | Status: COMPLETED | OUTPATIENT
Start: 2022-10-28 | End: 2022-10-28

## 2022-10-28 RX ORDER — VANCOMYCIN HCL 1 G
1250 VIAL (EA) INTRAVENOUS EVERY 12 HOURS
Refills: 0 | Status: DISCONTINUED | OUTPATIENT
Start: 2022-10-29 | End: 2022-10-30

## 2022-10-28 RX ORDER — MORPHINE SULFATE 50 MG/1
4 CAPSULE, EXTENDED RELEASE ORAL EVERY 8 HOURS
Refills: 0 | Status: DISCONTINUED | OUTPATIENT
Start: 2022-10-28 | End: 2022-10-31

## 2022-10-28 RX ADMIN — Medication 166.67 MILLIGRAM(S): at 20:40

## 2022-10-28 RX ADMIN — MORPHINE SULFATE 4 MILLIGRAM(S): 50 CAPSULE, EXTENDED RELEASE ORAL at 20:39

## 2022-10-28 RX ADMIN — MORPHINE SULFATE 4 MILLIGRAM(S): 50 CAPSULE, EXTENDED RELEASE ORAL at 21:00

## 2022-10-28 RX ADMIN — CEFTRIAXONE 100 MILLIGRAM(S): 500 INJECTION, POWDER, FOR SOLUTION INTRAMUSCULAR; INTRAVENOUS at 20:39

## 2022-10-28 RX ADMIN — METHADONE HYDROCHLORIDE 190 MILLIGRAM(S): 40 TABLET ORAL at 16:09

## 2022-10-28 NOTE — CHART NOTE - NSCHARTNOTEFT_GEN_A_CORE
PACU ANESTHESIA ADMISSION NOTE      Procedure:   Post op diagnosis:      ____  Intubated  TV:______       Rate: ______      FiO2: ______    _x___  Patent Airway    _x___  Full return of protective reflexes    _x___  Full recovery from anesthesia / back to baseline status    Vitals:    See anesthesia record      Mental Status:  _x___ Awake   _____ Alert   _____ Drowsy   _____ Sedated    Nausea/Vomiting:  _x___  NO       ______Yes,   See Post - Op Orders         Pain Scale (0-10):  __0___    Treatment: _x___ None    ____ See Post - Op/PCA Orders    Post - Operative Fluids:   __x__ Oral   ____ See Post - Op Orders    Plan: Discharge:   ____Home       __x___Floor     _____Critical Care    _____  Other:_________________    Comments:  No anesthesia issues or complications noted.  Discharge when criteria met.

## 2022-10-28 NOTE — PRE-ANESTHESIA EVALUATION ADULT - NSANTHOSAYNRD_GEN_A_CORE
No. URSULA screening performed.  STOP BANG Legend: 0-2 = LOW Risk; 3-4 = INTERMEDIATE Risk; 5-8 = HIGH Risk
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
## 2022-10-29 LAB
ALBUMIN SERPL ELPH-MCNC: 3.5 G/DL — SIGNIFICANT CHANGE UP (ref 3.5–5.2)
ALP SERPL-CCNC: 100 U/L — SIGNIFICANT CHANGE UP (ref 30–115)
ALT FLD-CCNC: 23 U/L — SIGNIFICANT CHANGE UP (ref 0–41)
ANION GAP SERPL CALC-SCNC: 15 MMOL/L — HIGH (ref 7–14)
AST SERPL-CCNC: 30 U/L — SIGNIFICANT CHANGE UP (ref 0–41)
BASOPHILS # BLD AUTO: 0.04 K/UL — SIGNIFICANT CHANGE UP (ref 0–0.2)
BASOPHILS NFR BLD AUTO: 0.8 % — SIGNIFICANT CHANGE UP (ref 0–1)
BILIRUB SERPL-MCNC: <0.2 MG/DL — SIGNIFICANT CHANGE UP (ref 0.2–1.2)
BUN SERPL-MCNC: 14 MG/DL — SIGNIFICANT CHANGE UP (ref 10–20)
CALCIUM SERPL-MCNC: 8.8 MG/DL — SIGNIFICANT CHANGE UP (ref 8.4–10.5)
CHLORIDE SERPL-SCNC: 105 MMOL/L — SIGNIFICANT CHANGE UP (ref 98–110)
CO2 SERPL-SCNC: 23 MMOL/L — SIGNIFICANT CHANGE UP (ref 17–32)
CREAT SERPL-MCNC: 0.8 MG/DL — SIGNIFICANT CHANGE UP (ref 0.7–1.5)
EGFR: 95 ML/MIN/1.73M2 — SIGNIFICANT CHANGE UP
EOSINOPHIL # BLD AUTO: 0.39 K/UL — SIGNIFICANT CHANGE UP (ref 0–0.7)
EOSINOPHIL NFR BLD AUTO: 7.4 % — SIGNIFICANT CHANGE UP (ref 0–8)
GAMMA INTERFERON BACKGROUND BLD IA-ACNC: 0.08 IU/ML — SIGNIFICANT CHANGE UP
GLUCOSE SERPL-MCNC: 108 MG/DL — HIGH (ref 70–99)
GRAM STN FLD: SIGNIFICANT CHANGE UP
HCT VFR BLD CALC: 37.2 % — SIGNIFICANT CHANGE UP (ref 37–47)
HGB BLD-MCNC: 12 G/DL — SIGNIFICANT CHANGE UP (ref 12–16)
IMM GRANULOCYTES NFR BLD AUTO: 0.4 % — HIGH (ref 0.1–0.3)
LYMPHOCYTES # BLD AUTO: 2.03 K/UL — SIGNIFICANT CHANGE UP (ref 1.2–3.4)
LYMPHOCYTES # BLD AUTO: 38.5 % — SIGNIFICANT CHANGE UP (ref 20.5–51.1)
M TB IFN-G BLD-IMP: NEGATIVE — SIGNIFICANT CHANGE UP
M TB IFN-G CD4+ BCKGRND COR BLD-ACNC: 0.01 IU/ML — SIGNIFICANT CHANGE UP
M TB IFN-G CD4+CD8+ BCKGRND COR BLD-ACNC: 0.03 IU/ML — SIGNIFICANT CHANGE UP
MAGNESIUM SERPL-MCNC: 1.9 MG/DL — SIGNIFICANT CHANGE UP (ref 1.8–2.4)
MCHC RBC-ENTMCNC: 26 PG — LOW (ref 27–31)
MCHC RBC-ENTMCNC: 32.3 G/DL — SIGNIFICANT CHANGE UP (ref 32–37)
MCV RBC AUTO: 80.5 FL — LOW (ref 81–99)
MONOCYTES # BLD AUTO: 0.64 K/UL — HIGH (ref 0.1–0.6)
MONOCYTES NFR BLD AUTO: 12.1 % — HIGH (ref 1.7–9.3)
NEUTROPHILS # BLD AUTO: 2.15 K/UL — SIGNIFICANT CHANGE UP (ref 1.4–6.5)
NEUTROPHILS NFR BLD AUTO: 40.8 % — LOW (ref 42.2–75.2)
NRBC # BLD: 0 /100 WBCS — SIGNIFICANT CHANGE UP (ref 0–0)
PLATELET # BLD AUTO: 321 K/UL — SIGNIFICANT CHANGE UP (ref 130–400)
POTASSIUM SERPL-MCNC: 4.9 MMOL/L — SIGNIFICANT CHANGE UP (ref 3.5–5)
POTASSIUM SERPL-SCNC: 4.9 MMOL/L — SIGNIFICANT CHANGE UP (ref 3.5–5)
PROT SERPL-MCNC: 7.2 G/DL — SIGNIFICANT CHANGE UP (ref 6–8)
QUANT TB PLUS MITOGEN MINUS NIL: 7.14 IU/ML — SIGNIFICANT CHANGE UP
RBC # BLD: 4.62 M/UL — SIGNIFICANT CHANGE UP (ref 4.2–5.4)
RBC # FLD: 14.1 % — SIGNIFICANT CHANGE UP (ref 11.5–14.5)
SODIUM SERPL-SCNC: 143 MMOL/L — SIGNIFICANT CHANGE UP (ref 135–146)
SPECIMEN SOURCE: SIGNIFICANT CHANGE UP
VANCOMYCIN TROUGH SERPL-MCNC: 8.9 UG/ML — SIGNIFICANT CHANGE UP (ref 5–10)
WBC # BLD: 5.27 K/UL — SIGNIFICANT CHANGE UP (ref 4.8–10.8)
WBC # FLD AUTO: 5.27 K/UL — SIGNIFICANT CHANGE UP (ref 4.8–10.8)

## 2022-10-29 PROCEDURE — 99233 SBSQ HOSP IP/OBS HIGH 50: CPT

## 2022-10-29 RX ADMIN — MORPHINE SULFATE 4 MILLIGRAM(S): 50 CAPSULE, EXTENDED RELEASE ORAL at 22:52

## 2022-10-29 RX ADMIN — METHADONE HYDROCHLORIDE 190 MILLIGRAM(S): 40 TABLET ORAL at 12:44

## 2022-10-29 RX ADMIN — MORPHINE SULFATE 4 MILLIGRAM(S): 50 CAPSULE, EXTENDED RELEASE ORAL at 14:11

## 2022-10-29 RX ADMIN — Medication 166.67 MILLIGRAM(S): at 05:51

## 2022-10-29 RX ADMIN — MORPHINE SULFATE 4 MILLIGRAM(S): 50 CAPSULE, EXTENDED RELEASE ORAL at 05:51

## 2022-10-29 RX ADMIN — Medication 166.67 MILLIGRAM(S): at 21:08

## 2022-10-29 RX ADMIN — ENOXAPARIN SODIUM 40 MILLIGRAM(S): 100 INJECTION SUBCUTANEOUS at 05:52

## 2022-10-29 RX ADMIN — Medication 2 MILLIGRAM(S): at 09:42

## 2022-10-29 RX ADMIN — MORPHINE SULFATE 4 MILLIGRAM(S): 50 CAPSULE, EXTENDED RELEASE ORAL at 13:56

## 2022-10-29 RX ADMIN — MORPHINE SULFATE 4 MILLIGRAM(S): 50 CAPSULE, EXTENDED RELEASE ORAL at 06:49

## 2022-10-29 RX ADMIN — Medication 2 MILLIGRAM(S): at 17:52

## 2022-10-29 RX ADMIN — CEFTRIAXONE 100 MILLIGRAM(S): 500 INJECTION, POWDER, FOR SOLUTION INTRAMUSCULAR; INTRAVENOUS at 16:43
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
## 2022-10-30 LAB
CULTURE RESULTS: SIGNIFICANT CHANGE UP
CULTURE RESULTS: SIGNIFICANT CHANGE UP
SPECIMEN SOURCE: SIGNIFICANT CHANGE UP
SPECIMEN SOURCE: SIGNIFICANT CHANGE UP

## 2022-10-30 PROCEDURE — 99233 SBSQ HOSP IP/OBS HIGH 50: CPT

## 2022-10-30 RX ORDER — VANCOMYCIN HCL 1 G
1500 VIAL (EA) INTRAVENOUS EVERY 12 HOURS
Refills: 0 | Status: DISCONTINUED | OUTPATIENT
Start: 2022-10-30 | End: 2022-11-02

## 2022-10-30 RX ORDER — VANCOMYCIN HCL 1 G
1500 VIAL (EA) INTRAVENOUS EVERY 12 HOURS
Refills: 0 | Status: DISCONTINUED | OUTPATIENT
Start: 2022-10-30 | End: 2022-10-30

## 2022-10-30 RX ADMIN — MORPHINE SULFATE 4 MILLIGRAM(S): 50 CAPSULE, EXTENDED RELEASE ORAL at 17:48

## 2022-10-30 RX ADMIN — MORPHINE SULFATE 4 MILLIGRAM(S): 50 CAPSULE, EXTENDED RELEASE ORAL at 17:33

## 2022-10-30 RX ADMIN — Medication 300 MILLIGRAM(S): at 17:33

## 2022-10-30 RX ADMIN — Medication 166.67 MILLIGRAM(S): at 08:36

## 2022-10-30 RX ADMIN — ENOXAPARIN SODIUM 40 MILLIGRAM(S): 100 INJECTION SUBCUTANEOUS at 05:33

## 2022-10-30 RX ADMIN — Medication 2 MILLIGRAM(S): at 10:29

## 2022-10-30 RX ADMIN — METHADONE HYDROCHLORIDE 190 MILLIGRAM(S): 40 TABLET ORAL at 11:29

## 2022-10-30 RX ADMIN — MORPHINE SULFATE 4 MILLIGRAM(S): 50 CAPSULE, EXTENDED RELEASE ORAL at 06:54

## 2022-10-30 RX ADMIN — CEFTRIAXONE 100 MILLIGRAM(S): 500 INJECTION, POWDER, FOR SOLUTION INTRAMUSCULAR; INTRAVENOUS at 16:26

## 2022-10-30 RX ADMIN — Medication 30 MILLILITER(S): at 19:58
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
## 2022-10-31 LAB
ALBUMIN SERPL ELPH-MCNC: 3.8 G/DL — SIGNIFICANT CHANGE UP (ref 3.5–5.2)
ALP SERPL-CCNC: 113 U/L — SIGNIFICANT CHANGE UP (ref 30–115)
ALT FLD-CCNC: 24 U/L — SIGNIFICANT CHANGE UP (ref 0–41)
ANION GAP SERPL CALC-SCNC: 8 MMOL/L — SIGNIFICANT CHANGE UP (ref 7–14)
AST SERPL-CCNC: 25 U/L — SIGNIFICANT CHANGE UP (ref 0–41)
BASOPHILS # BLD AUTO: 0.04 K/UL — SIGNIFICANT CHANGE UP (ref 0–0.2)
BASOPHILS NFR BLD AUTO: 0.6 % — SIGNIFICANT CHANGE UP (ref 0–1)
BILIRUB SERPL-MCNC: <0.2 MG/DL — SIGNIFICANT CHANGE UP (ref 0.2–1.2)
BUN SERPL-MCNC: 14 MG/DL — SIGNIFICANT CHANGE UP (ref 10–20)
CALCIUM SERPL-MCNC: 9.1 MG/DL — SIGNIFICANT CHANGE UP (ref 8.4–10.5)
CHLORIDE SERPL-SCNC: 100 MMOL/L — SIGNIFICANT CHANGE UP (ref 98–110)
CO2 SERPL-SCNC: 30 MMOL/L — SIGNIFICANT CHANGE UP (ref 17–32)
CREAT SERPL-MCNC: 0.8 MG/DL — SIGNIFICANT CHANGE UP (ref 0.7–1.5)
EGFR: 95 ML/MIN/1.73M2 — SIGNIFICANT CHANGE UP
EOSINOPHIL # BLD AUTO: 0.53 K/UL — SIGNIFICANT CHANGE UP (ref 0–0.7)
EOSINOPHIL NFR BLD AUTO: 8.2 % — HIGH (ref 0–8)
GLUCOSE SERPL-MCNC: 83 MG/DL — SIGNIFICANT CHANGE UP (ref 70–99)
HCT VFR BLD CALC: 39 % — SIGNIFICANT CHANGE UP (ref 37–47)
HCV RNA SPEC NAA+PROBE-LOG IU: SIGNIFICANT CHANGE UP IU/ML
HCV RNA SPEC NAA+PROBE-LOG IU: SIGNIFICANT CHANGE UP LOGIU/ML
HGB BLD-MCNC: 12 G/DL — SIGNIFICANT CHANGE UP (ref 12–16)
IMM GRANULOCYTES NFR BLD AUTO: 0.3 % — SIGNIFICANT CHANGE UP (ref 0.1–0.3)
LYMPHOCYTES # BLD AUTO: 2.57 K/UL — SIGNIFICANT CHANGE UP (ref 1.2–3.4)
LYMPHOCYTES # BLD AUTO: 39.8 % — SIGNIFICANT CHANGE UP (ref 20.5–51.1)
MCHC RBC-ENTMCNC: 25.4 PG — LOW (ref 27–31)
MCHC RBC-ENTMCNC: 30.8 G/DL — LOW (ref 32–37)
MCV RBC AUTO: 82.6 FL — SIGNIFICANT CHANGE UP (ref 81–99)
MONOCYTES # BLD AUTO: 0.92 K/UL — HIGH (ref 0.1–0.6)
MONOCYTES NFR BLD AUTO: 14.3 % — HIGH (ref 1.7–9.3)
NEUTROPHILS # BLD AUTO: 2.37 K/UL — SIGNIFICANT CHANGE UP (ref 1.4–6.5)
NEUTROPHILS NFR BLD AUTO: 36.8 % — LOW (ref 42.2–75.2)
NRBC # BLD: 0 /100 WBCS — SIGNIFICANT CHANGE UP (ref 0–0)
PLATELET # BLD AUTO: 371 K/UL — SIGNIFICANT CHANGE UP (ref 130–400)
POTASSIUM SERPL-MCNC: 4.6 MMOL/L — SIGNIFICANT CHANGE UP (ref 3.5–5)
POTASSIUM SERPL-SCNC: 4.6 MMOL/L — SIGNIFICANT CHANGE UP (ref 3.5–5)
PROT SERPL-MCNC: 7.7 G/DL — SIGNIFICANT CHANGE UP (ref 6–8)
RBC # BLD: 4.72 M/UL — SIGNIFICANT CHANGE UP (ref 4.2–5.4)
RBC # FLD: 14.1 % — SIGNIFICANT CHANGE UP (ref 11.5–14.5)
SARS-COV-2 RNA SPEC QL NAA+PROBE: DETECTED
SODIUM SERPL-SCNC: 138 MMOL/L — SIGNIFICANT CHANGE UP (ref 135–146)
WBC # BLD: 6.45 K/UL — SIGNIFICANT CHANGE UP (ref 4.8–10.8)
WBC # FLD AUTO: 6.45 K/UL — SIGNIFICANT CHANGE UP (ref 4.8–10.8)

## 2022-10-31 PROCEDURE — 99221 1ST HOSP IP/OBS SF/LOW 40: CPT

## 2022-10-31 PROCEDURE — 99251: CPT

## 2022-10-31 PROCEDURE — 99233 SBSQ HOSP IP/OBS HIGH 50: CPT

## 2022-10-31 RX ORDER — MORPHINE SULFATE 50 MG/1
4 CAPSULE, EXTENDED RELEASE ORAL EVERY 8 HOURS
Refills: 0 | Status: DISCONTINUED | OUTPATIENT
Start: 2022-10-31 | End: 2022-11-01

## 2022-10-31 RX ORDER — OXYCODONE HYDROCHLORIDE 5 MG/1
10 TABLET ORAL EVERY 6 HOURS
Refills: 0 | Status: DISCONTINUED | OUTPATIENT
Start: 2022-11-01 | End: 2022-11-02

## 2022-10-31 RX ADMIN — Medication 2 MILLIGRAM(S): at 11:37

## 2022-10-31 RX ADMIN — CEFTRIAXONE 100 MILLIGRAM(S): 500 INJECTION, POWDER, FOR SOLUTION INTRAMUSCULAR; INTRAVENOUS at 16:01

## 2022-10-31 RX ADMIN — Medication 300 MILLIGRAM(S): at 06:27

## 2022-10-31 RX ADMIN — Medication 300 MILLIGRAM(S): at 17:28

## 2022-10-31 RX ADMIN — Medication 2 MILLIGRAM(S): at 21:35

## 2022-10-31 RX ADMIN — MORPHINE SULFATE 4 MILLIGRAM(S): 50 CAPSULE, EXTENDED RELEASE ORAL at 06:29

## 2022-10-31 RX ADMIN — MORPHINE SULFATE 4 MILLIGRAM(S): 50 CAPSULE, EXTENDED RELEASE ORAL at 22:05

## 2022-10-31 RX ADMIN — MORPHINE SULFATE 4 MILLIGRAM(S): 50 CAPSULE, EXTENDED RELEASE ORAL at 14:53

## 2022-10-31 RX ADMIN — ENOXAPARIN SODIUM 40 MILLIGRAM(S): 100 INJECTION SUBCUTANEOUS at 05:13

## 2022-10-31 RX ADMIN — MORPHINE SULFATE 4 MILLIGRAM(S): 50 CAPSULE, EXTENDED RELEASE ORAL at 14:22

## 2022-10-31 RX ADMIN — METHADONE HYDROCHLORIDE 190 MILLIGRAM(S): 40 TABLET ORAL at 11:27

## 2022-10-31 RX ADMIN — MORPHINE SULFATE 4 MILLIGRAM(S): 50 CAPSULE, EXTENDED RELEASE ORAL at 21:35
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
## 2022-11-01 LAB
ALBUMIN SERPL ELPH-MCNC: 3.7 G/DL — SIGNIFICANT CHANGE UP (ref 3.5–5.2)
ALP SERPL-CCNC: 103 U/L — SIGNIFICANT CHANGE UP (ref 30–115)
ALT FLD-CCNC: 23 U/L — SIGNIFICANT CHANGE UP (ref 0–41)
ANION GAP SERPL CALC-SCNC: 8 MMOL/L — SIGNIFICANT CHANGE UP (ref 7–14)
AST SERPL-CCNC: 21 U/L — SIGNIFICANT CHANGE UP (ref 0–41)
BASOPHILS # BLD AUTO: 0.04 K/UL — SIGNIFICANT CHANGE UP (ref 0–0.2)
BASOPHILS NFR BLD AUTO: 0.8 % — SIGNIFICANT CHANGE UP (ref 0–1)
BILIRUB SERPL-MCNC: <0.2 MG/DL — SIGNIFICANT CHANGE UP (ref 0.2–1.2)
BUN SERPL-MCNC: 12 MG/DL — SIGNIFICANT CHANGE UP (ref 10–20)
CALCIUM SERPL-MCNC: 9 MG/DL — SIGNIFICANT CHANGE UP (ref 8.4–10.5)
CHLORIDE SERPL-SCNC: 100 MMOL/L — SIGNIFICANT CHANGE UP (ref 98–110)
CO2 SERPL-SCNC: 28 MMOL/L — SIGNIFICANT CHANGE UP (ref 17–32)
CREAT SERPL-MCNC: 0.8 MG/DL — SIGNIFICANT CHANGE UP (ref 0.7–1.5)
EGFR: 95 ML/MIN/1.73M2 — SIGNIFICANT CHANGE UP
EOSINOPHIL # BLD AUTO: 0.47 K/UL — SIGNIFICANT CHANGE UP (ref 0–0.7)
EOSINOPHIL NFR BLD AUTO: 9.9 % — HIGH (ref 0–8)
GLUCOSE SERPL-MCNC: 120 MG/DL — HIGH (ref 70–99)
HCT VFR BLD CALC: 35 % — LOW (ref 37–47)
HGB BLD-MCNC: 10.8 G/DL — LOW (ref 12–16)
IMM GRANULOCYTES NFR BLD AUTO: 0.4 % — HIGH (ref 0.1–0.3)
LYMPHOCYTES # BLD AUTO: 2.1 K/UL — SIGNIFICANT CHANGE UP (ref 1.2–3.4)
LYMPHOCYTES # BLD AUTO: 44 % — SIGNIFICANT CHANGE UP (ref 20.5–51.1)
MAGNESIUM SERPL-MCNC: 1.8 MG/DL — SIGNIFICANT CHANGE UP (ref 1.8–2.4)
MCHC RBC-ENTMCNC: 25.5 PG — LOW (ref 27–31)
MCHC RBC-ENTMCNC: 30.9 G/DL — LOW (ref 32–37)
MCV RBC AUTO: 82.5 FL — SIGNIFICANT CHANGE UP (ref 81–99)
MONOCYTES # BLD AUTO: 0.59 K/UL — SIGNIFICANT CHANGE UP (ref 0.1–0.6)
MONOCYTES NFR BLD AUTO: 12.4 % — HIGH (ref 1.7–9.3)
NEUTROPHILS # BLD AUTO: 1.55 K/UL — SIGNIFICANT CHANGE UP (ref 1.4–6.5)
NEUTROPHILS NFR BLD AUTO: 32.5 % — LOW (ref 42.2–75.2)
NRBC # BLD: 0 /100 WBCS — SIGNIFICANT CHANGE UP (ref 0–0)
PLATELET # BLD AUTO: 340 K/UL — SIGNIFICANT CHANGE UP (ref 130–400)
POTASSIUM SERPL-MCNC: 4.1 MMOL/L — SIGNIFICANT CHANGE UP (ref 3.5–5)
POTASSIUM SERPL-SCNC: 4.1 MMOL/L — SIGNIFICANT CHANGE UP (ref 3.5–5)
PROT SERPL-MCNC: 7.4 G/DL — SIGNIFICANT CHANGE UP (ref 6–8)
RBC # BLD: 4.24 M/UL — SIGNIFICANT CHANGE UP (ref 4.2–5.4)
RBC # FLD: 14.2 % — SIGNIFICANT CHANGE UP (ref 11.5–14.5)
SODIUM SERPL-SCNC: 136 MMOL/L — SIGNIFICANT CHANGE UP (ref 135–146)
VANCOMYCIN TROUGH SERPL-MCNC: 13.8 UG/ML — HIGH (ref 5–10)
VANCOMYCIN TROUGH SERPL-MCNC: 18.7 UG/ML — HIGH (ref 5–10)
WBC # BLD: 4.77 K/UL — LOW (ref 4.8–10.8)
WBC # FLD AUTO: 4.77 K/UL — LOW (ref 4.8–10.8)

## 2022-11-01 PROCEDURE — 99233 SBSQ HOSP IP/OBS HIGH 50: CPT

## 2022-11-01 RX ORDER — HYDROMORPHONE HYDROCHLORIDE 2 MG/ML
4 INJECTION INTRAMUSCULAR; INTRAVENOUS; SUBCUTANEOUS EVERY 4 HOURS
Refills: 0 | Status: DISCONTINUED | OUTPATIENT
Start: 2022-11-01 | End: 2022-11-02

## 2022-11-01 RX ADMIN — Medication 2 MILLIGRAM(S): at 15:15

## 2022-11-01 RX ADMIN — HYDROMORPHONE HYDROCHLORIDE 4 MILLIGRAM(S): 2 INJECTION INTRAMUSCULAR; INTRAVENOUS; SUBCUTANEOUS at 15:13

## 2022-11-01 RX ADMIN — METHADONE HYDROCHLORIDE 190 MILLIGRAM(S): 40 TABLET ORAL at 11:55

## 2022-11-01 RX ADMIN — MORPHINE SULFATE 4 MILLIGRAM(S): 50 CAPSULE, EXTENDED RELEASE ORAL at 05:48

## 2022-11-01 RX ADMIN — HYDROMORPHONE HYDROCHLORIDE 4 MILLIGRAM(S): 2 INJECTION INTRAMUSCULAR; INTRAVENOUS; SUBCUTANEOUS at 14:56

## 2022-11-01 RX ADMIN — Medication 300 MILLIGRAM(S): at 10:21

## 2022-11-01 RX ADMIN — CEFTRIAXONE 100 MILLIGRAM(S): 500 INJECTION, POWDER, FOR SOLUTION INTRAMUSCULAR; INTRAVENOUS at 16:47

## 2022-11-01 RX ADMIN — ENOXAPARIN SODIUM 40 MILLIGRAM(S): 100 INJECTION SUBCUTANEOUS at 05:47

## 2022-11-01 RX ADMIN — HYDROMORPHONE HYDROCHLORIDE 4 MILLIGRAM(S): 2 INJECTION INTRAMUSCULAR; INTRAVENOUS; SUBCUTANEOUS at 22:10
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
## 2022-11-01 NOTE — CONSULT NOTE ADULT - CONSULT REASON
Thoracic osteomyelitis; mid back pain
Can patient be safely sent home with PICC line for IV abx if she has IVDU history?
PICC line placement
discitis

## 2022-11-01 NOTE — PROGRESS NOTE ADULT - REASON FOR ADMISSION
Back Pain, Osteomyelitis
Fall
Patient is a 42y old  Female who presents with a chief complaint of osteomyelitis discitis (01 Nov 2022 13:05)
Patient is a 42y old  Female who presents with a chief complaint of Osteomyelitis (27 Oct 2022 14:10)
Patient is a 42y old  Female who presents with a chief complaint of Patient is a 42y old  Female who presents with a chief complaint of Osteomyelitis (27 Oct 2022 14:10) (27 Oct 2022 15:28)

## 2022-11-01 NOTE — CONSULT NOTE ADULT - SUBJECTIVE AND OBJECTIVE BOX
Spoke to resident taking care of the patient. Patient is currently not experiencing withdrawal symptoms and is enrolled in a methadone program. PICC line for antibiotic use that is medically indicated... CATCH Team Social workers and counselor’s will follow.   Spoke to resident taking care of the patient. Patient is currently not experiencing withdrawal symptoms and is enrolled in a methadone program. PICC line planned for discharge is for antibiotic use that is medically indicated and should not be withheld for substance use concerns (there is still risk it can be misused, but if the patient wants to relapse, she can with or without the PICC line). CATCH Team Social workers and counselor’s will follow.

## 2022-11-02 ENCOUNTER — TRANSCRIPTION ENCOUNTER (OUTPATIENT)
Age: 42
End: 2022-11-02

## 2022-11-02 VITALS
SYSTOLIC BLOOD PRESSURE: 102 MMHG | DIASTOLIC BLOOD PRESSURE: 65 MMHG | RESPIRATION RATE: 18 BRPM | TEMPERATURE: 97 F | HEART RATE: 83 BPM | OXYGEN SATURATION: 99 %

## 2022-11-02 LAB
ALBUMIN SERPL ELPH-MCNC: 3.6 G/DL — SIGNIFICANT CHANGE UP (ref 3.5–5.2)
ALP SERPL-CCNC: 103 U/L — SIGNIFICANT CHANGE UP (ref 30–115)
ALT FLD-CCNC: 21 U/L — SIGNIFICANT CHANGE UP (ref 0–41)
ANION GAP SERPL CALC-SCNC: 10 MMOL/L — SIGNIFICANT CHANGE UP (ref 7–14)
AST SERPL-CCNC: 22 U/L — SIGNIFICANT CHANGE UP (ref 0–41)
BASOPHILS # BLD AUTO: 0.02 K/UL — SIGNIFICANT CHANGE UP (ref 0–0.2)
BASOPHILS NFR BLD AUTO: 0.3 % — SIGNIFICANT CHANGE UP (ref 0–1)
BILIRUB SERPL-MCNC: <0.2 MG/DL — SIGNIFICANT CHANGE UP (ref 0.2–1.2)
BUN SERPL-MCNC: 8 MG/DL — LOW (ref 10–20)
CALCIUM SERPL-MCNC: 8.5 MG/DL — SIGNIFICANT CHANGE UP (ref 8.4–10.5)
CHLORIDE SERPL-SCNC: 99 MMOL/L — SIGNIFICANT CHANGE UP (ref 98–110)
CO2 SERPL-SCNC: 24 MMOL/L — SIGNIFICANT CHANGE UP (ref 17–32)
CREAT SERPL-MCNC: 0.7 MG/DL — SIGNIFICANT CHANGE UP (ref 0.7–1.5)
EGFR: 111 ML/MIN/1.73M2 — SIGNIFICANT CHANGE UP
EOSINOPHIL # BLD AUTO: 0.23 K/UL — SIGNIFICANT CHANGE UP (ref 0–0.7)
EOSINOPHIL NFR BLD AUTO: 2.9 % — SIGNIFICANT CHANGE UP (ref 0–8)
GLUCOSE SERPL-MCNC: 95 MG/DL — SIGNIFICANT CHANGE UP (ref 70–99)
HCT VFR BLD CALC: 38 % — SIGNIFICANT CHANGE UP (ref 37–47)
HGB BLD-MCNC: 11.9 G/DL — LOW (ref 12–16)
IMM GRANULOCYTES NFR BLD AUTO: 0.4 % — HIGH (ref 0.1–0.3)
LYMPHOCYTES # BLD AUTO: 2.48 K/UL — SIGNIFICANT CHANGE UP (ref 1.2–3.4)
LYMPHOCYTES # BLD AUTO: 31.2 % — SIGNIFICANT CHANGE UP (ref 20.5–51.1)
MAGNESIUM SERPL-MCNC: 1.8 MG/DL — SIGNIFICANT CHANGE UP (ref 1.8–2.4)
MCHC RBC-ENTMCNC: 25.6 PG — LOW (ref 27–31)
MCHC RBC-ENTMCNC: 31.3 G/DL — LOW (ref 32–37)
MCV RBC AUTO: 81.7 FL — SIGNIFICANT CHANGE UP (ref 81–99)
MONOCYTES # BLD AUTO: 0.74 K/UL — HIGH (ref 0.1–0.6)
MONOCYTES NFR BLD AUTO: 9.3 % — SIGNIFICANT CHANGE UP (ref 1.7–9.3)
NEUTROPHILS # BLD AUTO: 4.44 K/UL — SIGNIFICANT CHANGE UP (ref 1.4–6.5)
NEUTROPHILS NFR BLD AUTO: 55.9 % — SIGNIFICANT CHANGE UP (ref 42.2–75.2)
NRBC # BLD: 0 /100 WBCS — SIGNIFICANT CHANGE UP (ref 0–0)
PLATELET # BLD AUTO: 348 K/UL — SIGNIFICANT CHANGE UP (ref 130–400)
POTASSIUM SERPL-MCNC: 4.4 MMOL/L — SIGNIFICANT CHANGE UP (ref 3.5–5)
POTASSIUM SERPL-SCNC: 4.4 MMOL/L — SIGNIFICANT CHANGE UP (ref 3.5–5)
PROT SERPL-MCNC: 7.3 G/DL — SIGNIFICANT CHANGE UP (ref 6–8)
RBC # BLD: 4.65 M/UL — SIGNIFICANT CHANGE UP (ref 4.2–5.4)
RBC # FLD: 14 % — SIGNIFICANT CHANGE UP (ref 11.5–14.5)
SODIUM SERPL-SCNC: 133 MMOL/L — LOW (ref 135–146)
VANCOMYCIN TROUGH SERPL-MCNC: 17.8 UG/ML — HIGH (ref 5–10)
WBC # BLD: 7.94 K/UL — SIGNIFICANT CHANGE UP (ref 4.8–10.8)
WBC # FLD AUTO: 7.94 K/UL — SIGNIFICANT CHANGE UP (ref 4.8–10.8)

## 2022-11-02 PROCEDURE — 99239 HOSP IP/OBS DSCHRG MGMT >30: CPT

## 2022-11-02 RX ORDER — HYDROMORPHONE HYDROCHLORIDE 2 MG/ML
1 INJECTION INTRAMUSCULAR; INTRAVENOUS; SUBCUTANEOUS
Qty: 30 | Refills: 0
Start: 2022-11-02 | End: 2022-11-06

## 2022-11-02 RX ORDER — HYDROMORPHONE HYDROCHLORIDE 2 MG/ML
1 INJECTION INTRAMUSCULAR; INTRAVENOUS; SUBCUTANEOUS
Qty: 25 | Refills: 0
Start: 2022-11-02 | End: 2022-11-06

## 2022-11-02 RX ORDER — ALPRAZOLAM 0.25 MG
2 TABLET ORAL EVERY 8 HOURS
Refills: 0 | Status: DISCONTINUED | OUTPATIENT
Start: 2022-11-02 | End: 2022-11-02

## 2022-11-02 RX ORDER — HYDROMORPHONE HYDROCHLORIDE 2 MG/ML
4 INJECTION INTRAMUSCULAR; INTRAVENOUS; SUBCUTANEOUS EVERY 4 HOURS
Refills: 0 | Status: DISCONTINUED | OUTPATIENT
Start: 2022-11-02 | End: 2022-11-02

## 2022-11-02 RX ADMIN — HYDROMORPHONE HYDROCHLORIDE 4 MILLIGRAM(S): 2 INJECTION INTRAMUSCULAR; INTRAVENOUS; SUBCUTANEOUS at 13:19

## 2022-11-02 RX ADMIN — ENOXAPARIN SODIUM 40 MILLIGRAM(S): 100 INJECTION SUBCUTANEOUS at 06:32

## 2022-11-02 RX ADMIN — Medication 2 MILLIGRAM(S): at 04:46

## 2022-11-02 RX ADMIN — Medication 300 MILLIGRAM(S): at 06:32

## 2022-11-02 RX ADMIN — HYDROMORPHONE HYDROCHLORIDE 4 MILLIGRAM(S): 2 INJECTION INTRAMUSCULAR; INTRAVENOUS; SUBCUTANEOUS at 06:32

## 2022-11-02 RX ADMIN — METHADONE HYDROCHLORIDE 190 MILLIGRAM(S): 40 TABLET ORAL at 11:11

## 2022-11-02 RX ADMIN — HYDROMORPHONE HYDROCHLORIDE 4 MILLIGRAM(S): 2 INJECTION INTRAMUSCULAR; INTRAVENOUS; SUBCUTANEOUS at 11:39

## 2022-11-02 NOTE — DISCHARGE NOTE PROVIDER - NSDCFUADDINST_GEN_ALL_CORE_FT
Please follow up with your doctors to trend blood work (ESR + CRP + BMP) next week, then every 2 weeks until you complete the antibiotics course on 1/8/2023.

## 2022-11-02 NOTE — PROGRESS NOTE ADULT - SUBJECTIVE AND OBJECTIVE BOX
CANDE DIETZ  42y, Female  Allergy: No Known Allergies    Hospital Day: 7d    Patient seen and examined earlier today. Feeling well, discussed in detail the need for IV abx on discharge given high clinical suspicion for discitis and OM, but will discuss with ID.     PMH/PSH:  PAST MEDICAL & SURGICAL HISTORY:  H/O discitis      No significant past surgical history          LAST 24-Hr EVENTS:    VITALS:  T(F): 96.6 (10-31-22 @ 09:30), Max: 97.4 (10-31-22 @ 05:02)  HR: 73 (10-31-22 @ 09:30)  BP: 111/56 (10-31-22 @ 09:30) (107/56 - 111/74)  RR: 18 (10-31-22 @ 09:30)  SpO2: 95% (10-31-22 @ 09:30)        TESTS & MEASUREMENTS:  Weight (Kg):   BMI (kg/m2): 33.8 (10-28)                          12.0   6.45  )-----------( 371      ( 31 Oct 2022 05:43 )             39.0       10-31    138  |  100  |  14  ----------------------------<  83  4.6   |  30  |  0.8    Ca    9.1      31 Oct 2022 05:43    TPro  7.7  /  Alb  3.8  /  TBili  <0.2  /  DBili  x   /  AST  25  /  ALT  24  /  AlkPhos  113  10-31    LIVER FUNCTIONS - ( 31 Oct 2022 05:43 )  Alb: 3.8 g/dL / Pro: 7.7 g/dL / ALK PHOS: 113 U/L / ALT: 24 U/L / AST: 25 U/L / GGT: x                 Culture - Tissue with Gram Stain (collected 10-28-22 @ 12:30)  Source: .Tissue Other, T7 aspirate  Gram Stain (10-29-22 @ 01:23):    No polymorphonuclear cells seen per low power field    No organisms seen per oil power field  Preliminary Report (10-29-22 @ 20:02):    No growth    Culture - Blood (collected 10-24-22 @ 21:18)  Source: .Blood Blood  Final Report (10-30-22 @ 03:00):    No Growth Final    Culture - Blood (collected 10-24-22 @ 21:18)  Source: .Blood Blood  Final Report (10-30-22 @ 03:00):    No Growth Final                COVID-19 PCR: NotDetec (10-24-22 @ 21:02)      RADIOLOGY, ECG, & ADDITIONAL TESTS:      RECENT DIAGNOSTIC ORDERS:  IR PICC: Routine  Transport: Stret[facility]-C[facility]  Note: "COMPLETED" Status indicates the request has been reviewed by Interventional Radiology. (10-31-22 @ 14:26)  COVID-19 PCR: STAT  Specimen Source: Nasopharyngeal (10-31-22 @ 13:38)  Magnesium, Serum: AM Sched. Collection: 01-Nov-2022 04:30 (10-31-22 @ 09:36)  Comprehensive Metabolic Panel: AM Sched. Collection: 01-Nov-2022 04:30 (10-31-22 @ 09:36)  Complete Blood Count + Automated Diff: AM Sched. Collection: 01-Nov-2022 04:30 (10-31-22 @ 09:36)  COVID-19 PCR: AM Sched. Collection: 31-Oct-2022 04:30  Specimen Source: Nasopharyngeal (10-30-22 @ 20:19)      MEDICATIONS:  MEDICATIONS  (STANDING):  cefTRIAXone   IVPB 2000 milliGRAM(s) IV Intermittent every 24 hours  enoxaparin Injectable 40 milliGRAM(s) SubCutaneous every 24 hours  methadone    Tablet 190 milliGRAM(s) Oral daily  vancomycin  IVPB 1500 milliGRAM(s) IV Intermittent every 12 hours    MEDICATIONS  (PRN):  acetaminophen     Tablet .. 650 milliGRAM(s) Oral every 6 hours PRN Temp greater or equal to 38C (100.4F), Mild Pain (1 - 3)  ALPRAZolam 2 milliGRAM(s) Oral every 8 hours PRN anxiety  aluminum hydroxide/magnesium hydroxide/simethicone Suspension 30 milliLiter(s) Oral every 4 hours PRN Dyspepsia  melatonin 3 milliGRAM(s) Oral at bedtime PRN Insomnia  morphine  - Injectable 4 milliGRAM(s) IV Push every 8 hours PRN Severe Pain (7 - 10)  ondansetron Injectable 4 milliGRAM(s) IV Push every 8 hours PRN Nausea and/or Vomiting      HOME MEDICATIONS:  albuterol 90 mcg/inh inhalation aerosol with adapter (10-25)  ALPRAZolam 2 mg oral tablet (10-25)  methadone (10-25)  pantoprazole 40 mg oral delayed release tablet (10-25)  Symbicort 160 mcg-4.5 mcg/inh inhalation aerosol (10-25)      PHYSICAL EXAM:  GENERAL: comfortable, ambulating around the room, NAD   HEAD:  Atraumatic, Normocephalic  EYES: conjunctiva and sclera clear  NECK: Supple  CHEST/LUNG: Clear to auscultation bilaterally; No wheeze  HEART: Regular rate and rhythm  ABDOMEN: Soft, Nontender, Nondistended  EXTREMITIES: No clubbing, cyanosis, or edema  PSYCH: AAOx3  NEUROLOGY: non-focal  SKIN: LE varicose veins     
  CANDE DIETZ  [facility] [address] [facility] [place]      Patient is a 42y old  Female who presents with a chief complaint of Patient is a 42y old  Female who presents with a chief complaint of osteomyelitis discitis (01 Nov 2022 13:05) (01 Nov 2022 15:00)        Interval events:  Patient seen and examined at bedside. Says pain in back is stable. Had a loose BM this morning, but no fevers or abdominal pain.     -PMHx: H/O discitis      -PSHx:No significant past surgical history            REVIEW OF SYSTEMS:  CONSTITUTIONAL: No fever, weight loss, or fatigue  RESPIRATORY: No cough, wheezing, chills or hemoptysis; No shortness of breath  CARDIOVASCULAR: No chest pain, palpitations, dizziness, or leg swelling  GASTROINTESTINAL: as above   NEUROLOGICAL: No headaches  LYMPH NODES: No enlarged glands  MUSCULOSKELETAL: as above       T(C): , Max: 36.8 (11-02-22 @ 05:13)  HR: 83 (11-02-22 @ 05:13)  BP: 106/58 (11-02-22 @ 05:13)  RR: 18 (11-02-22 @ 05:13)  SpO2: 96% (11-02-22 @ 05:13)  CAPILLARY BLOOD GLUCOSE          PHYSICAL EXAM:  GENERAL: comfortable, ambulating around the room, NAD   HEAD:  Atraumatic, Normocephalic  EYES: conjunctiva and sclera clear  NECK: Supple  CHEST/LUNG: Clear to auscultation bilaterally; No wheeze  HEART: Regular rate and rhythm  ABDOMEN: Soft, Nontender, Nondistended  EXTREMITIES: No clubbing, cyanosis, or edema  PSYCH: AAOx3  NEUROLOGY: non-focal  SKIN: LE varicose veins       LABS:          11.9  7.94  )-------(348          38.0  N=55.9  L=31.2  MCV=81.7    133<L>|99|8<L>  ------------------<95  4.4|24|0.7  eGFR:--  Ca:8.5            Microbiology:    Culture - Tissue with Gram Stain (collected 10-28-22 @ 12:30)  Source: .Tissue Other, T7 aspirate  Gram Stain (10-29-22 @ 01:23):    No polymorphonuclear cells seen per low power field    No organisms seen per oil power field  Preliminary Report (10-29-22 @ 20:02):    No growth    Culture - Blood (collected 10-24-22 @ 21:18)  Source: .Blood Blood  Final Report (10-30-22 @ 03:00):    No Growth Final    Culture - Blood (collected 10-24-22 @ 21:18)  Source: .Blood Blood  Final Report (10-30-22 @ 03:00):    No Growth Final        RADIOLOGY & ADDITIONAL TESTS:        Medications:  acetaminophen     Tablet .. 650 milliGRAM(s) Oral every 6 hours PRN  ALPRAZolam 2 milliGRAM(s) Oral every 8 hours PRN  aluminum hydroxide/magnesium hydroxide/simethicone Suspension 30 milliLiter(s) Oral every 4 hours PRN  cefTRIAXone   IVPB 2000 milliGRAM(s) IV Intermittent every 24 hours  enoxaparin Injectable 40 milliGRAM(s) SubCutaneous every 24 hours  HYDROmorphone   Tablet 4 milliGRAM(s) Oral every 4 hours PRN  melatonin 3 milliGRAM(s) Oral at bedtime PRN  ondansetron Injectable 4 milliGRAM(s) IV Push every 8 hours PRN  oxyCODONE    IR 10 milliGRAM(s) Oral every 6 hours PRN  vancomycin  IVPB 1500 milliGRAM(s) IV Intermittent every 12 hours      
CANDE DIETZ  42y, Female  Allergy: No Known Allergies    Hospital Day: 3d    Patient seen and examined earlier today.  Reports pain is better controlled. Pt requesting biopsy under anesthesia.     PMH/PSH:  PAST MEDICAL & SURGICAL HISTORY:  H/O discitis      No significant past surgical history          LAST 24-Hr EVENTS:    VITALS:  T(F): 96.4 (10-27-22 @ 08:12), Max: 97 (10-27-22 @ 05:00)  HR: 70 (10-27-22 @ 08:12)  BP: 109/58 (10-27-22 @ 08:12) (104/62 - 118/63)  RR: 18 (10-27-22 @ 08:12)  SpO2: 98% (10-27-22 @ 08:12)          TESTS & MEASUREMENTS:  Weight/BMI  89.4 (10-24-22 @ 19:13)  33.8 (10-24-22 @ 19:13)                          13.0   5.89  )-----------( 339      ( 27 Oct 2022 06:20 )             41.0       INR: 1.05 ratio (10-25-22 @ 08:05)    10-27    138  |  100  |  14  ----------------------------<  85  4.9   |  24  |  1.0    Ca    9.2      27 Oct 2022 06:20  Mg     2.0     10-27    TPro  7.7  /  Alb  3.7  /  TBili  <0.2  /  DBili  x   /  AST  32  /  ALT  25  /  AlkPhos  112  10-27    LIVER FUNCTIONS - ( 27 Oct 2022 06:20 )  Alb: 3.7 g/dL / Pro: 7.7 g/dL / ALK PHOS: 112 U/L / ALT: 25 U/L / AST: 32 U/L / GGT: x                 Culture - Blood (collected 10-24-22 @ 21:18)  Source: .Blood Blood  Preliminary Report (10-26-22 @ 03:01):    No growth to date.    Culture - Blood (collected 10-24-22 @ 21:18)  Source: .Blood Blood  Preliminary Report (10-26-22 @ 03:01):    No growth to date.                COVID-19 PCR: NotDetec (10-24-22 @ 21:02)        A1C with Estimated Average Glucose Result: 5.3 % (10-14-22 @ 07:53)          RADIOLOGY, ECG, & ADDITIONAL TESTS:  12 Lead ECG:   Ventricular Rate 66 BPM    Atrial Rate 66 BPM    P-R Interval 164 ms    QRS Duration 82 ms    Q-T Interval 422 ms    QTC Calculation(Bazett) 442 ms    P Axis 40 degrees    R Axis 33 degrees    T Axis 22 degrees    Diagnosis Line Normal sinus rhythm  Normal ECG    Confirmed by SHAYNA MARTELL MD ([id number]) on 10/26/2022 1:38:18 PM (10-26-22 @ 08:05)      RECENT DIAGNOSTIC ORDERS:  Diet, NPO after Midnight:      NPO Start Date: 27-Oct-2022,   NPO Start Time: 23:59 (10-27-22 @ 12:54)  Hepatitis C RNA, Qualitative: 16:00 (10-27-22 @ 11:36)  Hepatitis C Virus RNA Detection by PCR: 16:00 (10-27-22 @ 11:35)  Methadone In-House Drug Confirmation, Urine: 17:22 (10-26-22 @ 21:40)  Cocaine In-House Drug Confirmation, Urine: 17:22 (10-26-22 @ 21:40)  Opiates In-House Drug Confirmation, Urine: 17:22 (10-26-22 @ 20:43)      MEDICATIONS:  MEDICATIONS  (STANDING):  enoxaparin Injectable 40 milliGRAM(s) SubCutaneous every 24 hours  methadone    Tablet 190 milliGRAM(s) Oral daily    MEDICATIONS  (PRN):  acetaminophen     Tablet .. 650 milliGRAM(s) Oral every 6 hours PRN Temp greater or equal to 38C (100.4F), Mild Pain (1 - 3)  ALPRAZolam 2 milliGRAM(s) Oral every 8 hours PRN anxiety  aluminum hydroxide/magnesium hydroxide/simethicone Suspension 30 milliLiter(s) Oral every 4 hours PRN Dyspepsia  ketorolac   Injectable 15 milliGRAM(s) IV Push every 6 hours PRN Severe Pain (7 - 10)  melatonin 3 milliGRAM(s) Oral at bedtime PRN Insomnia  morphine  - Injectable 4 milliGRAM(s) IV Push every 8 hours PRN Severe Pain (7 - 10)  ondansetron Injectable 4 milliGRAM(s) IV Push every 8 hours PRN Nausea and/or Vomiting      HOME MEDICATIONS:  albuterol 90 mcg/inh inhalation aerosol with adapter (10-25)  ALPRAZolam 2 mg oral tablet (10-25)  methadone (10-25)  pantoprazole 40 mg oral delayed release tablet (10-25)  Symbicort 160 mcg-4.5 mcg/inh inhalation aerosol (10-25)      PHYSICAL EXAM:  GENERAL: In pain, fatigued, well-developed  HEAD:  Atraumatic, Normocephalic  EYES: conjunctiva and sclera clear  NECK: Supple  CHEST/LUNG: Clear to auscultation bilaterally; No wheeze  HEART: Regular rate and rhythm  ABDOMEN: Soft, Nontender, Nondistended  EXTREMITIES: No clubbing, cyanosis, or edema  PSYCH: AAOx3  NEUROLOGY: non-focal  SKIN: No rashes or lesions      
  CANDE DIETZ  [facility] [address] [place] [address] ([facility] [place]      Patient is a 42y old  Female who presents with a chief complaint of Back Pain, Osteomyelitis (29 Oct 2022 13:19)        Interval events:  Patient seen and examined at bedside. Says she has pain in left lower back, but stable. No other events.     -PMHx: H/O discitis      -PSHx:No significant past surgical history            REVIEW OF SYSTEMS:  CONSTITUTIONAL: No fever, weight loss, or fatigue  RESPIRATORY: No cough, wheezing, chills or hemoptysis; No shortness of breath  CARDIOVASCULAR: No chest pain, palpitations, dizziness, or leg swelling  GASTROINTESTINAL: No abdominal or epigastric pain. No nausea, vomiting, or hematemesis; No diarrhea or constipation. No melena or hematochezia.  NEUROLOGICAL: No headaches  LYMPH NODES: No enlarged glands  MUSCULOSKELETAL: as above       T(C): , Max: 36.2 (10-30-22 @ 12:37)  HR: 89 (10-30-22 @ 12:37)  BP: 112/61 (10-30-22 @ 12:37)  RR: 18 (10-30-22 @ 12:37)  SpO2: 96% (10-30-22 @ 12:37)  CAPILLARY BLOOD GLUCOSE          PHYSICAL EXAM:  GENERAL: In pain, fatigued, well-developed  HEAD:  Atraumatic, Normocephalic  EYES: conjunctiva and sclera clear  NECK: Supple  CHEST/LUNG: Clear to auscultation bilaterally; No wheeze  HEART: Regular rate and rhythm  ABDOMEN: Soft, Nontender, Nondistended  EXTREMITIES: No clubbing, cyanosis, or edema  PSYCH: AAOx3  NEUROLOGY: non-focal  SKIN: No rashes or lesions      LABS:              Microbiology:    Culture - Tissue with Gram Stain (collected 10-28-22 @ 12:30)  Source: .Tissue Other, T7 aspirate  Gram Stain (10-29-22 @ 01:23):    No polymorphonuclear cells seen per low power field    No organisms seen per oil power field  Preliminary Report (10-29-22 @ 20:02):    No growth    Culture - Blood (collected 10-24-22 @ 21:18)  Source: .Blood Blood  Final Report (10-30-22 @ 03:00):    No Growth Final    Culture - Blood (collected 10-24-22 @ 21:18)  Source: .Blood Blood  Final Report (10-30-22 @ 03:00):    No Growth Final        RADIOLOGY & ADDITIONAL TESTS:        Medications:  acetaminophen     Tablet .. 650 milliGRAM(s) Oral every 6 hours PRN  ALPRAZolam 2 milliGRAM(s) Oral every 8 hours PRN  aluminum hydroxide/magnesium hydroxide/simethicone Suspension 30 milliLiter(s) Oral every 4 hours PRN  cefTRIAXone   IVPB 2000 milliGRAM(s) IV Intermittent every 24 hours  enoxaparin Injectable 40 milliGRAM(s) SubCutaneous every 24 hours  ketorolac   Injectable 15 milliGRAM(s) IV Push every 6 hours PRN  melatonin 3 milliGRAM(s) Oral at bedtime PRN  methadone    Tablet 190 milliGRAM(s) Oral daily  morphine  - Injectable 4 milliGRAM(s) IV Push every 8 hours PRN  ondansetron Injectable 4 milliGRAM(s) IV Push every 8 hours PRN  vancomycin  IVPB 1500 milliGRAM(s) IV Intermittent every 12 hours      
CANDE DIETZ  42y, Female  Allergy: No Known Allergies    Hospital Day: 2d    Patient seen and examined earlier today. Reports having back pain.     PMH/PSH:  PAST MEDICAL & SURGICAL HISTORY:  H/O discitis      No significant past surgical history          LAST 24-Hr EVENTS:    VITALS:  T(F): 96.4 (10-26-22 @ 08:47), Max: 97.6 (10-25-22 @ 18:22)  HR: 78 (10-26-22 @ 08:47)  BP: 117/74 (10-26-22 @ 08:47) (112/71 - 129/85)  RR: 18 (10-26-22 @ 08:47)  SpO2: 96% (10-26-22 @ 08:47)          TESTS & MEASUREMENTS:  Weight/BMI  89.4 (10-24-22 @ 19:13)  33.8 (10-24-22 @ 19:13)                          12.4   6.68  )-----------( 325      ( 26 Oct 2022 05:13 )             37.6     PT/INR - ( 25 Oct 2022 08:05 )   PT: 12.00 sec;   INR: 1.05 ratio           INR: 1.05 ratio (10-25-22 @ 08:05)    10-26    138  |  102  |  8<L>  ----------------------------<  84  4.2   |  26  |  0.8    Ca    9.2      26 Oct 2022 05:13  Mg     2.0     10-26    TPro  7.7  /  Alb  4.0  /  TBili  0.2  /  DBili  <0.2  /  AST  25  /  ALT  21  /  AlkPhos  99  10-26    LIVER FUNCTIONS - ( 26 Oct 2022 05:13 )  Alb: 4.0 g/dL / Pro: 7.7 g/dL / ALK PHOS: 99 U/L / ALT: 21 U/L / AST: 25 U/L / GGT: x                 Culture - Blood (collected 10-24-22 @ 21:18)  Source: .Blood Blood  Preliminary Report (10-26-22 @ 03:01):    No growth to date.    Culture - Blood (collected 10-24-22 @ 21:18)  Source: .Blood Blood  Preliminary Report (10-26-22 @ 03:01):    No growth to date.                COVID-19 PCR: NotDetec (10-24-22 @ 21:02)        A1C with Estimated Average Glucose Result: 5.3 % (10-14-22 @ 07:53)          RADIOLOGY, ECG, & ADDITIONAL TESTS:  12 Lead ECG:   Ventricular Rate 66 BPM    Atrial Rate 66 BPM    P-R Interval 164 ms    QRS Duration 82 ms    Q-T Interval 422 ms    QTC Calculation(Bazett) 442 ms    P Axis 40 degrees    R Axis 33 degrees    T Axis 22 degrees    Diagnosis Line Normal sinus rhythm  Normal ECG    Confirmed by SHAYNA MARTELL MD ([id number]) on 10/26/2022 1:38:18 PM (10-26-22 @ 08:05)      RECENT DIAGNOSTIC ORDERS:  Hepatitis C Virus RNA Detection by PCR: AM Sched. Collection: 27-Oct-2022 04:30 (10-26-22 @ 15:24)  Hepatitis C RNA, Qualitative: AM Sched. Collection: 27-Oct-2022 04:30 (10-26-22 @ 15:24)  Hepatitis C RNA, Qualitative: 05:13 (10-26-22 @ 15:16)  Activated Partial Thromboplastin Time:  Start Date:27-Oct-2022. 20:00 (10-26-22 @ 15:07)  Prothrombin Time and INR, Plasma:  Start Date:27-Oct-2022. 20:00 (10-26-22 @ 15:07)  Comprehensive Metabolic Panel: AM Sched. Collection: 27-Oct-2022 04:30 (10-26-22 @ 15:07)  Complete Blood Count + Automated Diff: AM Sched. Collection: 27-Oct-2022 04:30 (10-26-22 @ 15:07)  Magnesium, Serum: 20:00 (10-26-22 @ 15:07)  Magnesium, Serum: AM Sched. Collection: 27-Oct-2022 04:30 (10-26-22 @ 15:07)  Comprehensive Metabolic Panel: 20:00 (10-26-22 @ 15:07)  Complete Blood Count + Automated Diff: 20:00 (10-26-22 @ 15:07)  Drug Screen W/PCP, Urine: Routine (10-25-22 @ 20:34)  Quantiferon Plus TB: AM Sched. Collection: 26-Oct-2022 04:30 (10-25-22 @ 20:33)  HIV-1/2 Antigen/Antibody Screen by [facility]: RENETTA Baum. Collection: 26-Oct-2022 04:30 (10-25-22 @ 20:32)  Diet, Regular (10-25-22 @ 20:28)      MEDICATIONS:  MEDICATIONS  (STANDING):  enoxaparin Injectable 40 milliGRAM(s) SubCutaneous every 24 hours  methadone    Tablet 190 milliGRAM(s) Oral daily    MEDICATIONS  (PRN):  acetaminophen     Tablet .. 650 milliGRAM(s) Oral every 6 hours PRN Temp greater or equal to 38C (100.4F), Mild Pain (1 - 3)  ALPRAZolam 2 milliGRAM(s) Oral every 8 hours PRN anxiety  aluminum hydroxide/magnesium hydroxide/simethicone Suspension 30 milliLiter(s) Oral every 4 hours PRN Dyspepsia  ketorolac   Injectable 15 milliGRAM(s) IV Push every 6 hours PRN Severe Pain (7 - 10)  melatonin 3 milliGRAM(s) Oral at bedtime PRN Insomnia  morphine  - Injectable 4 milliGRAM(s) IV Push every 8 hours PRN Severe Pain (7 - 10)  ondansetron Injectable 4 milliGRAM(s) IV Push every 8 hours PRN Nausea and/or Vomiting      HOME MEDICATIONS:  albuterol 90 mcg/inh inhalation aerosol with adapter (10-25)  ALPRAZolam 2 mg oral tablet (10-25)  methadone (10-25)  pantoprazole 40 mg oral delayed release tablet (10-25)  Symbicort 160 mcg-4.5 mcg/inh inhalation aerosol (10-25)      PHYSICAL EXAM:  GENERAL: In pain, fatigued, well-developed  HEAD:  Atraumatic, Normocephalic  EYES: conjunctiva and sclera clear  NECK: Supple  CHEST/LUNG: Clear to auscultation bilaterally; No wheeze  HEART: Regular rate and rhythm  ABDOMEN: Soft, Nontender, Nondistended  EXTREMITIES: No clubbing, cyanosis, or edema  PSYCH: AAOx3  NEUROLOGY: non-focal  SKIN: No rashes or lesions      
CANDE DIETZ  42y, Female  Allergy: No Known Allergies    Hospital Day: 4d    Patient seen and examined earlier today.  No acute events overnight.  s/p biopsy.     ROS: neg except as noted above    PMH/PSH:  PAST MEDICAL & SURGICAL HISTORY:  H/O discitis      No significant past surgical history          LAST 24-Hr EVENTS:    VITALS:  T(F): 98.1 (10-28-22 @ 13:02), Max: 98.1 (10-28-22 @ 09:50)  HR: 73 (10-28-22 @ 13:12)  BP: 101/59 (10-28-22 @ 13:32) (101/59 - 121/74)  RR: 20 (10-28-22 @ 13:32)  SpO2: 96% (10-28-22 @ 13:32)          TESTS & MEASUREMENTS:  Weight/BMI  89.4 (10-28-22 @ 10:19)  33.8 (10-28-22 @ 10:19)    10-27-22 @ 07:01  -  10-28-22 @ 07:00  --------------------------------------------------------  IN: 0 mL / OUT: 300 mL / NET: -300 mL    10-28-22 @ 07:01  -  10-28-22 @ 15:46  --------------------------------------------------------  IN: 340 mL / OUT: 300 mL / NET: 40 mL                            13.0   5.89  )-----------( 339      ( 27 Oct 2022 06:20 )             41.0     PT/INR - ( 27 Oct 2022 23:21 )   PT: 10.80 sec;   INR: 0.95 ratio         PTT - ( 27 Oct 2022 23:21 )  PTT:37.9 sec  INR: 0.95 ratio (10-27-22 @ 23:21)  INR: 1.05 ratio (10-25-22 @ 08:05)    10-27    141  |  100  |  15  ----------------------------<  68<L>  4.6   |  28  |  1.1    Ca    9.4      27 Oct 2022 23:21  Mg     1.9     10-27    TPro  8.1<H>  /  Alb  4.0  /  TBili  <0.2  /  DBili  x   /  AST  27  /  ALT  26  /  AlkPhos  107  10-27    LIVER FUNCTIONS - ( 27 Oct 2022 23:21 )  Alb: 4.0 g/dL / Pro: 8.1 g/dL / ALK PHOS: 107 U/L / ALT: 26 U/L / AST: 27 U/L / GGT: x                 Culture - Blood (collected 10-24-22 @ 21:18)  Source: .Blood Blood  Preliminary Report (10-26-22 @ 03:01):    No growth to date.    Culture - Blood (collected 10-24-22 @ 21:18)  Source: .Blood Blood  Preliminary Report (10-26-22 @ 03:01):    No growth to date.                COVID-19 PCR: NotDetec (10-24-22 @ 21:02)        A1C with Estimated Average Glucose Result: 5.3 % (10-14-22 @ 07:53)          RADIOLOGY, ECG, & ADDITIONAL TESTS:  12 Lead ECG:   Ventricular Rate 66 BPM    Atrial Rate 66 BPM    P-R Interval 164 ms    QRS Duration 82 ms    Q-T Interval 422 ms    QTC Calculation(Bazett) 442 ms    P Axis 40 degrees    R Axis 33 degrees    T Axis 22 degrees    Diagnosis Line Normal sinus rhythm  Normal ECG    Confirmed by SHAYNA MARTELL MD ([id number]) on 10/26/2022 1:38:18 PM (10-26-22 @ 08:05)      RECENT DIAGNOSTIC ORDERS:  Culture - Tissue with Gram Stain: Routine  Specimen Source: T7 aspirate  Addl Info: T7 aspirate. r/o osteomyelitis (10-28-22 @ 12:20)  Culture - Other: Routine  Specimen Source: Other  Addl Info: bone biopsy T7 aspirate (10-28-22 @ 12:19)  Culture - Aspirate with Gram Stain: Routine  Specimen Source: Aspirate  Addl Info: bone T7 aspirate (10-28-22 @ 12:19)  ABO Rh Confirmatory Specimen: 20:00  Addl Info: Conditional: ABO Rh Confirmatory Specimen (10-27-22 @ 16:08)      MEDICATIONS:  MEDICATIONS  (STANDING):  methadone    Tablet 190 milliGRAM(s) Oral daily    MEDICATIONS  (PRN):  acetaminophen     Tablet .. 650 milliGRAM(s) Oral every 6 hours PRN Temp greater or equal to 38C (100.4F), Mild Pain (1 - 3)  ALPRAZolam 2 milliGRAM(s) Oral every 8 hours PRN anxiety  aluminum hydroxide/magnesium hydroxide/simethicone Suspension 30 milliLiter(s) Oral every 4 hours PRN Dyspepsia  ketorolac   Injectable 15 milliGRAM(s) IV Push every 6 hours PRN Severe Pain (7 - 10)  melatonin 3 milliGRAM(s) Oral at bedtime PRN Insomnia  morphine  - Injectable 4 milliGRAM(s) IV Push every 8 hours PRN Severe Pain (7 - 10)  ondansetron Injectable 4 milliGRAM(s) IV Push every 8 hours PRN Nausea and/or Vomiting      HOME MEDICATIONS:  albuterol 90 mcg/inh inhalation aerosol with adapter (10-25)  ALPRAZolam 2 mg oral tablet (10-25)  methadone (10-25)  pantoprazole 40 mg oral delayed release tablet (10-25)  Symbicort 160 mcg-4.5 mcg/inh inhalation aerosol (10-25)      PHYSICAL EXAM:  GENERAL: In pain, fatigued, well-developed  HEAD:  Atraumatic, Normocephalic  EYES: conjunctiva and sclera clear  NECK: Supple  CHEST/LUNG: Clear to auscultation bilaterally; No wheeze  HEART: Regular rate and rhythm  ABDOMEN: Soft, Nontender, Nondistended  EXTREMITIES: No clubbing, cyanosis, or edema  PSYCH: AAOx3  NEUROLOGY: non-focal  SKIN: No rashes or lesions      
CANDE DIETZ  42y, Female  Allergy: No Known Allergies    Hospital Day: 5d    Patient seen and examined earlier today.  reports mild pain in the back after procedure. No acute events overnight.     ROS: neg except as noted above    PMH/PSH:  PAST MEDICAL & SURGICAL HISTORY:  H/O discitis      No significant past surgical history          LAST 24-Hr EVENTS:    VITALS:  T(F): 97.7 (10-29-22 @ 08:43), Max: 98.8 (10-28-22 @ 21:42)  HR: 71 (10-29-22 @ 08:43)  BP: 109/74 (10-29-22 @ 08:43) (101/59 - 118/62)  RR: 18 (10-29-22 @ 08:43)  SpO2: 96% (10-29-22 @ 08:43)          TESTS & MEASUREMENTS:  Weight/BMI  89.4 (10-28-22 @ 10:19)  33.8 (10-28-22 @ 10:19)    10-27-22 @ 07:01  -  10-28-22 @ 07:00  --------------------------------------------------------  IN: 0 mL / OUT: 300 mL / NET: -300 mL    10-28-22 @ 07:01  -  10-29-22 @ 07:00  --------------------------------------------------------  IN: 340 mL / OUT: 300 mL / NET: 40 mL                            12.0   5.27  )-----------( 321      ( 29 Oct 2022 07:05 )             37.2     PT/INR - ( 27 Oct 2022 23:21 )   PT: 10.80 sec;   INR: 0.95 ratio         PTT - ( 27 Oct 2022 23:21 )  PTT:37.9 sec  INR: 0.95 ratio (10-27-22 @ 23:21)  INR: 1.05 ratio (10-25-22 @ 08:05)    10-29    143  |  105  |  14  ----------------------------<  108<H>  4.9   |  23  |  0.8    Ca    8.8      29 Oct 2022 07:05  Mg     1.9     10-29    TPro  7.2  /  Alb  3.5  /  TBili  <0.2  /  DBili  x   /  AST  30  /  ALT  23  /  AlkPhos  100  10-29    LIVER FUNCTIONS - ( 29 Oct 2022 07:05 )  Alb: 3.5 g/dL / Pro: 7.2 g/dL / ALK PHOS: 100 U/L / ALT: 23 U/L / AST: 30 U/L / GGT: x                 Culture - Tissue with Gram Stain (collected 10-28-22 @ 12:30)  Source: .Tissue Other, T7 aspirate  Gram Stain (10-29-22 @ 01:23):    No polymorphonuclear cells seen per low power field    No organisms seen per oil power field    Culture - Blood (collected 10-24-22 @ 21:18)  Source: .Blood Blood  Preliminary Report (10-26-22 @ 03:01):    No growth to date.    Culture - Blood (collected 10-24-22 @ 21:18)  Source: .Blood Blood  Preliminary Report (10-26-22 @ 03:01):    No growth to date.                COVID-19 PCR: NotDetec (10-24-22 @ 21:02)        A1C with Estimated Average Glucose Result: 5.3 % (10-14-22 @ 07:53)          RADIOLOGY, ECG, & ADDITIONAL TESTS:  12 Lead ECG:   Ventricular Rate 66 BPM    Atrial Rate 66 BPM    P-R Interval 164 ms    QRS Duration 82 ms    Q-T Interval 422 ms    QTC Calculation(Bazett) 442 ms    P Axis 40 degrees    R Axis 33 degrees    T Axis 22 degrees    Diagnosis Line Normal sinus rhythm  Normal ECG    Confirmed by SHAYNA MARTELL MD ([id number]) on 10/26/2022 1:38:18 PM (10-26-22 @ 08:05)      RECENT DIAGNOSTIC ORDERS:  Vancomycin Level, Trough: 16:00 (10-29-22 @ 11:41)  Diet, DASH/TLC:   Sodium & Cholesterol Restricted (10-28-22 @ 15:46)      MEDICATIONS:  MEDICATIONS  (STANDING):  cefTRIAXone   IVPB 2000 milliGRAM(s) IV Intermittent every 24 hours  enoxaparin Injectable 40 milliGRAM(s) SubCutaneous every 24 hours  methadone    Tablet 190 milliGRAM(s) Oral daily  vancomycin  IVPB      vancomycin  IVPB 1250 milliGRAM(s) IV Intermittent every 12 hours    MEDICATIONS  (PRN):  acetaminophen     Tablet .. 650 milliGRAM(s) Oral every 6 hours PRN Temp greater or equal to 38C (100.4F), Mild Pain (1 - 3)  ALPRAZolam 2 milliGRAM(s) Oral every 8 hours PRN anxiety  aluminum hydroxide/magnesium hydroxide/simethicone Suspension 30 milliLiter(s) Oral every 4 hours PRN Dyspepsia  ketorolac   Injectable 15 milliGRAM(s) IV Push every 6 hours PRN Severe Pain (7 - 10)  melatonin 3 milliGRAM(s) Oral at bedtime PRN Insomnia  morphine  - Injectable 4 milliGRAM(s) IV Push every 8 hours PRN Severe Pain (7 - 10)  ondansetron Injectable 4 milliGRAM(s) IV Push every 8 hours PRN Nausea and/or Vomiting      HOME MEDICATIONS:  albuterol 90 mcg/inh inhalation aerosol with adapter (10-25)  ALPRAZolam 2 mg oral tablet (10-25)  methadone (10-25)  pantoprazole 40 mg oral delayed release tablet (10-25)  Symbicort 160 mcg-4.5 mcg/inh inhalation aerosol (10-25)      PHYSICAL EXAM:  GENERAL: In pain, fatigued, well-developed  HEAD:  Atraumatic, Normocephalic  EYES: conjunctiva and sclera clear  NECK: Supple  CHEST/LUNG: Clear to auscultation bilaterally; No wheeze  HEART: Regular rate and rhythm  ABDOMEN: Soft, Nontender, Nondistended  EXTREMITIES: No clubbing, cyanosis, or edema  PSYCH: AAOx3  NEUROLOGY: non-focal  SKIN: No rashes or lesions      
CANDE DIETZ  42y, Female  Allergy: No Known Allergies    Hospital Day: 8d    Patient seen and examined earlier today. No acute events overnight.  reports back pain not controlled with current pain regimen.      ROS: neg except as noted above    PMH/PSH:  PAST MEDICAL & SURGICAL HISTORY:  H/O discitis      No significant past surgical history          LAST 24-Hr EVENTS:    VITALS:  T(F): 97.8 (11-01-22 @ 08:49), Max: 97.8 (11-01-22 @ 08:49)  HR: 72 (11-01-22 @ 08:49)  BP: 101/60 (11-01-22 @ 08:49) (101/60 - 125/63)  RR: 18 (11-01-22 @ 08:49)  SpO2: 97% (11-01-22 @ 08:49)          TESTS & MEASUREMENTS:  Weight/BMI  89.4 (10-28-22 @ 10:19)  33.8 (10-28-22 @ 10:19)                          10.8   4.77  )-----------( 340      ( 01 Nov 2022 06:49 )             35.0       INR: 0.95 ratio (10-27-22 @ 23:21)    11-01    136  |  100  |  12  ----------------------------<  120<H>  4.1   |  28  |  0.8    Ca    9.0      01 Nov 2022 06:49  Mg     1.8     11-01    TPro  7.4  /  Alb  3.7  /  TBili  <0.2  /  DBili  x   /  AST  21  /  ALT  23  /  AlkPhos  103  11-01    LIVER FUNCTIONS - ( 01 Nov 2022 06:49 )  Alb: 3.7 g/dL / Pro: 7.4 g/dL / ALK PHOS: 103 U/L / ALT: 23 U/L / AST: 21 U/L / GGT: x                 Culture - Tissue with Gram Stain (collected 10-28-22 @ 12:30)  Source: .Tissue Other, T7 aspirate  Gram Stain (10-29-22 @ 01:23):    No polymorphonuclear cells seen per low power field    No organisms seen per oil power field  Preliminary Report (10-29-22 @ 20:02):    No growth                COVID-19 PCR: Detected (10-31-22 @ 13:38)      Vancomycin Level, Trough: 13.8 ug/mL (11-01-22 @ 06:49)  Vancomycin Level, Trough: 8.9 ug/mL (10-29-22 @ 20:44)    A1C with Estimated Average Glucose Result: 5.3 % (10-14-22 @ 07:53)          RADIOLOGY, ECG, & ADDITIONAL TESTS:  12 Lead ECG:   Ventricular Rate 66 BPM    Atrial Rate 66 BPM    P-R Interval 164 ms    QRS Duration 82 ms    Q-T Interval 422 ms    QTC Calculation(Bazett) 442 ms    P Axis 40 degrees    R Axis 33 degrees    T Axis 22 degrees    Diagnosis Line Normal sinus rhythm  Normal ECG    Confirmed by SHAYNA MARTELL MD ([id number]) on 10/26/2022 1:38:18 PM (10-26-22 @ 08:05)      RECENT DIAGNOSTIC ORDERS:  Vancomycin Level, Trough: 16:00 (11-01-22 @ 10:20)  Magnesium, Serum: AM Sched. Collection: 02-Nov-2022 04:30 (11-01-22 @ 09:01)  Comprehensive Metabolic Panel: AM Sched. Collection: 02-Nov-2022 04:30 (11-01-22 @ 09:01)  Complete Blood Count + Automated Diff: AM Sched. Collection: 02-Nov-2022 04:30 (11-01-22 @ 09:01)  Vancomycin Level, Trough: AM Sched. Collection: 02-Nov-2022 04:30 (11-01-22 @ 09:01)  Vancomycin Level, Trough: STAT (11-01-22 @ 05:15)      MEDICATIONS:  MEDICATIONS  (STANDING):  cefTRIAXone   IVPB 2000 milliGRAM(s) IV Intermittent every 24 hours  enoxaparin Injectable 40 milliGRAM(s) SubCutaneous every 24 hours  HYDROmorphone   Tablet 4 milliGRAM(s) Oral every 4 hours  methadone    Tablet 190 milliGRAM(s) Oral daily  vancomycin  IVPB 1500 milliGRAM(s) IV Intermittent every 12 hours    MEDICATIONS  (PRN):  acetaminophen     Tablet .. 650 milliGRAM(s) Oral every 6 hours PRN Temp greater or equal to 38C (100.4F), Mild Pain (1 - 3)  ALPRAZolam 2 milliGRAM(s) Oral every 8 hours PRN anxiety  aluminum hydroxide/magnesium hydroxide/simethicone Suspension 30 milliLiter(s) Oral every 4 hours PRN Dyspepsia  melatonin 3 milliGRAM(s) Oral at bedtime PRN Insomnia  ondansetron Injectable 4 milliGRAM(s) IV Push every 8 hours PRN Nausea and/or Vomiting  oxyCODONE    IR 10 milliGRAM(s) Oral every 6 hours PRN Severe Pain (7 - 10)      HOME MEDICATIONS:  albuterol 90 mcg/inh inhalation aerosol with adapter (10-25)  ALPRAZolam 2 mg oral tablet (10-25)  methadone (10-25)  pantoprazole 40 mg oral delayed release tablet (10-25)  Symbicort 160 mcg-4.5 mcg/inh inhalation aerosol (10-25)      PHYSICAL EXAM:  GENERAL: comfortable, ambulating around the room, NAD   HEAD:  Atraumatic, Normocephalic  EYES: conjunctiva and sclera clear  NECK: Supple  CHEST/LUNG: Clear to auscultation bilaterally; No wheeze  HEART: Regular rate and rhythm  ABDOMEN: Soft, Nontender, Nondistended  EXTREMITIES: No clubbing, cyanosis, or edema  PSYCH: AAOx3  NEUROLOGY: non-focal  SKIN: LE varicose veins       
CANDE DIETZ  42y, Female  Allergy: No Known Allergies      LOS  2d    CHIEF COMPLAINT: Fall (26 Oct 2022 16:39)      INTERVAL EVENTS/HPI  - No acute events overnight  - T(F): , Max: 97.6 (10-25-22 @ 18:22)  - Denies any worsening symptoms  - Tolerating medication  - WBC Count: 6.68 (10-26-22 @ 05:13)  WBC Count: 5.34 (10-25-22 @ 08:05)     - Creatinine, Serum: 0.8 (10-26-22 @ 05:13)  Creatinine, Serum: 0.8 (10-25-22 @ 08:05)       ROS  General: Denies rigors, nightsweats  HEENT: Denies headache, rhinorrhea, sore throat, eye pain  CV: Denies CP, palpitations  PULM: Denies wheezing, hemoptysis  GI: Denies hematemesis, hematochezia, melena  : Denies discharge, hematuria  MSK: Denies arthralgias, myalgias  SKIN: Denies rash, lesions  NEURO: Denies paresthesias, weakness  PSYCH: Denies depression, anxiety    VITALS:  T(F): 96.9, Max: 97.6 (10-25-22 @ 18:22)  HR: 82  BP: 104/62  RR: 18Vital Signs Last 24 Hrs  T(C): 36.1 (26 Oct 2022 17:18), Max: 36.4 (25 Oct 2022 18:22)  T(F): 96.9 (26 Oct 2022 17:18), Max: 97.6 (25 Oct 2022 18:22)  HR: 82 (26 Oct 2022 17:18) (71 - 102)  BP: 104/62 (26 Oct 2022 17:18) (104/62 - 129/85)  BP(mean): --  RR: 18 (26 Oct 2022 08:47) (17 - 19)  SpO2: 97% (26 Oct 2022 17:18) (96% - 98%)    Parameters below as of 26 Oct 2022 17:18  Patient On (Oxygen Delivery Method): room air        PHYSICAL EXAM:  Gen: NAD, resting in bed  HEENT: Normocephalic, atraumatic  Neck: supple, no lymphadenopathy  CV: Regular rate & regular rhythm  Lungs: decreased BS at bases, no fremitus  Abdomen: Soft, BS present  Ext: Warm, well perfused  Neuro: non focal, awake  Skin: no rash, no erythema  Lines: no phlebitis    FH: Non-contributory  Social Hx: Non-contributory    TESTS & MEASUREMENTS:                        12.4   6.68  )-----------( 325      ( 26 Oct 2022 05:13 )             37.6     10-26    138  |  102  |  8<L>  ----------------------------<  84  4.2   |  26  |  0.8    Ca    9.2      26 Oct 2022 05:13  Mg     2.0     10-26    TPro  7.7  /  Alb  4.0  /  TBili  0.2  /  DBili  <0.2  /  AST  25  /  ALT  21  /  AlkPhos  99  10-26      LIVER FUNCTIONS - ( 26 Oct 2022 05:13 )  Alb: 4.0 g/dL / Pro: 7.7 g/dL / ALK PHOS: 99 U/L / ALT: 21 U/L / AST: 25 U/L / GGT: x               Culture - Blood (collected 10-24-22 @ 21:18)  Source: .Blood Blood  Preliminary Report (10-26-22 @ 03:01):    No growth to date.    Culture - Blood (collected 10-24-22 @ 21:18)  Source: .Blood Blood  Preliminary Report (10-26-22 @ 03:01):    No growth to date.    Culture - Blood (collected 10-15-22 @ 04:30)  Source: .Blood Blood  Final Report (10-20-22 @ 21:00):    No Growth Final    Culture - Blood (collected 10-13-22 @ 09:02)  Source: .Blood Blood  Final Report (10-18-22 @ 18:01):    No Growth Final    Culture - Blood (collected 10-13-22 @ 09:02)  Source: .Blood Blood  Final Report (10-18-22 @ 18:01):    No Growth Final    Culture - Urine (collected 10-13-22 @ 01:20)  Source: Clean Catch Clean Catch (Midstream)  Final Report (10-14-22 @ 07:39):    <10,000 CFU/mL Normal Urogenital Glenna        Lactate, Blood: 1.5 mmol/L (10-24-22 @ 20:51)      INFECTIOUS DISEASES TESTING  Hepatitis C Virus Interpretation Result: Reactive (10-26-22 @ 05:13)  COVID-19 PCR: NotDetec (10-24-22 @ 21:02)  HIV-1/2 Combo Result: Nonreact (10-18-22 @ 09:01)  COVID-19 PCR: NotDetec (10-13-22 @ 23:15)      INFLAMMATORY MARKERS  Sedimentation Rate, Erythrocyte: 60 mm/Hr (10-26-22 @ 05:13)  C-Reactive Protein, Serum: 5.7 mg/L (10-26-22 @ 05:13)  Sedimentation Rate, Erythrocyte: 74 mm/Hr (10-15-22 @ 04:30)  C-Reactive Protein, Serum: 10.3 mg/L (10-15-22 @ 04:30)      RADIOLOGY & ADDITIONAL TESTS:  I have personally reviewed the last available Chest xray  CXR      CT      CARDIOLOGY TESTING  12 Lead ECG:   Ventricular Rate 66 BPM    Atrial Rate 66 BPM    P-R Interval 164 ms    QRS Duration 82 ms    Q-T Interval 422 ms    QTC Calculation(Bazett) 442 ms    P Axis 40 degrees    R Axis 33 degrees    T Axis 22 degrees    Diagnosis Line Normal sinus rhythm  Normal ECG    Confirmed by SHAYNA MARTELL MD ([id number]) on 10/26/2022 1:38:18 PM (10-26-22 @ 08:05)  12 Lead ECG:   Ventricular Rate 83 BPM    Atrial Rate 83 BPM    P-R Interval 154 ms    QRS Duration 82 ms    Q-T Interval 408 ms    QTC Calculation(Bazett) 479 ms    P Axis 58 degrees    R Axis 34 degrees    T Axis 28 degrees    Diagnosis Line Normal sinus rhythm  Normal ECG    Confirmed by VERONICA KIRKLAND MD ([id number]) on 10/19/2022 6:55:25 AM (10-18-22 @ 22:35)      MEDICATIONS  enoxaparin Injectable 40 SubCutaneous every 24 hours  methadone    Tablet 190 Oral daily      WEIGHT  Weight (kg): 89.4 (10-24-22 @ 19:13)  Creatinine, Serum: 0.8 mg/dL (10-26-22 @ 05:13)      ANTIBIOTICS:      All available historical records have been reviewed
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
## 2022-11-02 NOTE — DISCHARGE NOTE PROVIDER - CARE PROVIDER_API CALL
Alfredito Wang)  Internal Medicine  [address] [place] [address] [id number]  Phone: [phone]  Fax: [phone]  Follow Up Time: 1 month

## 2022-11-02 NOTE — DISCHARGE NOTE PROVIDER - NSDCCPCAREPLAN_GEN_ALL_CORE_FT
PRINCIPAL DISCHARGE DIAGNOSIS  Diagnosis: Osteomyelitis of thoracic spine  Assessment and Plan of Treatment: You presented to the hospital with back pain and were found to have inflammation in your thoracic spine on imaging. You had a biopsy of your spine done which unfortunately didn't reveal the specific micro-organism causing the infection. Infectious disease specialist was consulted and you received IV antibiotics at home, and you will be discharged on DS Bactrim twice/day until 12/8. Please continue taking the antibiotic even if you feel better to completely eradicate the infection. Please follow-up with your primary doctors and get blood work next week (ESR+CRP+BMP), and then every 2 weeks until you complete the antibiotic course to monitor your inflammatory markers and kidney function.

## 2022-11-02 NOTE — PROGRESS NOTE ADULT - TIME BILLING
I have personally seen and examined this patient.  I have reviewed all pertinent clinical information and reviewed all relevant imaging and diagnostic studies personally.   If possible, I counseled the patient about diagnostic testing and treatment plan.   I discussed my recommendations with the primary team.
Total time spent to complete patient's bedside assessment, physical examination, review medical chart including labs & imaging, discuss medical plan of care with housestaff was more than 35 minutes
Total time spent to complete patient's bedside assessment, physical examination, review medical chart including labs & imaging, discuss medical plan of care with housestaff was more than 35 minutes

## 2022-11-02 NOTE — CHART NOTE - NSCHARTNOTEFT_GEN_A_CORE
Pt with MRI concerning for discitis  Unclear hx IVDU- initially reported yes, then denied saying only snorted heroin  HCV + with UD VL   IR biopsy- CX NG  on MMTP    While afebrile, BCX NG, C-Reactive Protein not significant= Serum: 5.7 mg/L (10.26.22 @ 05:13), benefit of treating discitis outweighs risk  Recommend PICC with Vanc/Ceftriaxone as in previous notes  If no plan for PICC, per primary team/psych, bactrim 1 DS BID PO-  about low K diet and monitor Cr/K as outpatient  x 5 more weeks    This is not a billable note    - ID follow-up with Dr. Seng Calles for Telehealth. We will call the patient between 10:30-6:30      [id number]       [phone]       Fax [phone]

## 2022-11-02 NOTE — DISCHARGE NOTE PROVIDER - NSDCMRMEDTOKEN_GEN_ALL_CORE_FT
albuterol 90 mcg/inh inhalation aerosol with adapter: 1 puff(s) inhaled 2 times a day  ALPRAZolam 2 mg oral tablet: 1 tab(s) orally 3 times a day, As Needed  Bactrim  mg-160 mg oral tablet: 1 tab(s) orally 2 times a day until 12/8/2023  methadone: 190 milligram(s) orally once a day  pantoprazole 40 mg oral delayed release tablet: 1 tab(s) orally once a day  Symbicort 160 mcg-4.5 mcg/inh inhalation aerosol: 2 puff(s) inhaled 2 times a day   albuterol 90 mcg/inh inhalation aerosol with adapter: 1 puff(s) inhaled 2 times a day  ALPRAZolam 2 mg oral tablet: 1 tab(s) orally 3 times a day, As Needed  Bactrim  mg-160 mg oral tablet: 1 tab(s) orally 2 times a day until 12/8/2023  HYDROmorphone 4 mg oral tablet: 1 tab(s) orally every 4 hours, As needed, Severe Pain (7 - 10) MDD:6 tabs  methadone: 190 milligram(s) orally once a day  pantoprazole 40 mg oral delayed release tablet: 1 tab(s) orally once a day  Symbicort 160 mcg-4.5 mcg/inh inhalation aerosol: 2 puff(s) inhaled 2 times a day

## 2022-11-02 NOTE — DISCHARGE NOTE PROVIDER - ATTENDING DISCHARGE PHYSICAL EXAMINATION:
PHYSICAL EXAM:  GENERAL: comfortable, ambulating around the room, NAD   HEAD:  Atraumatic, Normocephalic  EYES: conjunctiva and sclera clear  NECK: Supple  CHEST/LUNG: Clear to auscultation bilaterally; No wheeze  HEART: Regular rate and rhythm  ABDOMEN: Soft, Nontender, Nondistended  EXTREMITIES: No clubbing, cyanosis, or edema  PSYCH: AAOx3  NEUROLOGY: non-focal  SKIN: LE varicose veins     Vital Signs Last 24 Hrs  T(C): 36.2 (02 Nov 2022 12:20), Max: 36.8 (02 Nov 2022 05:13)  T(F): 97.1 (02 Nov 2022 12:20), Max: 98.3 (02 Nov 2022 05:13)  HR: 83 (02 Nov 2022 12:20) (83 - 84)  BP: 102/65 (02 Nov 2022 12:20) (102/65 - 109/63)  BP(mean): --  RR: 18 (02 Nov 2022 12:20) (18 - 18)  SpO2: 99% (02 Nov 2022 12:20) (96% - 100%)    Parameters below as of 01 Nov 2022 17:47  Patient On (Oxygen Delivery Method): room air
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
## 2022-11-02 NOTE — PROGRESS NOTE ADULT - PROVIDER SPECIALTY LIST ADULT
Internal Medicine
Infectious Disease
Hospitalist
Hospitalist

## 2022-11-03 LAB
CULTURE RESULTS: SIGNIFICANT CHANGE UP
SPECIMEN SOURCE: SIGNIFICANT CHANGE UP

## 2022-11-03 NOTE — ED ADULT NURSE NOTE - NS ED NURSE DISCH DISPOSITION
Subjective:       Patient ID: Nadja Monae is a 76 y.o. female.    Referring physician: Dr. Brandon Samano  Reason for Referral: Breast Cancer    Left Breast Cancer Stage IA ([id number])--Diagnosed 22  Biopsy/pathology:  Left breast biopsy done 22--2:00 4CMFN core biopsies positive for invasive ductal carcinoma, mucinous type, intermediate grade, measuring at least 0.7cm, DCIS, cribriform pattern, intermediate grade, w/o necrosis, %, AZ 87%, Her2 1+ by IHC, negative by FISH, Ki67 5.3%.  Surgery/pathology: Left breast lumpectomy with SLN biopsy done 3/21/22--invasive mucinous carcinoma, well differentiated, Grade 1, measuring 0.9cm, clear margins, 4 negative SLNs.   Imagin. Screening Oscar MMG bilateral done at [facility] 22--an asymmetry anterior UOQ of left breast, measures 1.3cm, needs additional imaging, BIRADS 0.  2. Diagnostic MMG/US done at [facility] 2/15/22--lobulated mass in left breast at 2:00 4CMFN, measures 0.9X0.5X1cm, suspicious by US, focal asymmetry in UOQ of left breast does persist on MMG, BIRADS 4, biopsy recommeded.     [facility] DEXA 22--AP spine T-score 0.4, Total hip left -1.0, right -1.0, Femur left -1.9, right -1.7 c/w osteopoenia bilateral femurs.     Treatment history:  Left breast lumpectomy and SLN biopsy 3/21/22.  Adjuvant radiation completed 22.    Current treatment plan:  Femara X 5 years. Started on 6/15/22    Chief Complaint: Other Misc (Pt reports no new concerns today except soreness in sx site L side.)    HPI   Patient presents for follow-up of breast cancer. She started Femara in 2022. At her last appointment she had numerous complaints -  nausea, dizziness, diarrhea, hot flashes and fogginess. We were not sure if all her complaints were from the Femara so it was held. Symptoms continued after holding so she restarted the Femara. She states the dizziness and fogginess have improved. She does continue with GI issues - intermittent diarrhea. Recent colonoscopy last  week was good. Baseline DEXA from February revealed osteopenia to bilateral femurs. I recommended we try Prolia due to her GI issues, she is in agreement. Left MMG on 10/17/22 at [facility] benign. No other problems reported.      Past Medical History:   Diagnosis Date    Anxiety       Review of patient's allergies indicates:  No Known Allergies   Current Outpatient Medications on File Prior to Visit   Medication Sig Dispense Refill    allopurinoL (ZYLOPRIM) 300 MG tablet Take 300 mg by mouth once daily.      dapagliflozin (FARXIGA) 10 mg tablet   0 Refill(s)      EScitalopram oxalate (LEXAPRO) 20 MG tablet Take 20 mg by mouth once daily.      glimepiride (AMARYL) 4 MG tablet Take 4 mg by mouth once daily.      isosorbide mononitrate (IMDUR) 30 MG 24 hr tablet Take 15 mg by mouth once daily.      letrozole (FEMARA) 2.5 mg Tab TAKE 1 TABLET(2.5 MG) BY MOUTH EVERY DAY 30 tablet 3    metFORMIN (GLUCOPHAGE) 1000 MG tablet Take 1,000 mg by mouth 2 (two) times daily.      ONETOUCH ULTRA TEST Strp 2 (two) times a day. Test      pioglitazone (ACTOS) 30 MG tablet Take 15 mg by mouth once daily.      rosuvastatin (CRESTOR) 20 MG tablet Take 5 mg by mouth Daily.      diclofenac (VOLTAREN) 75 MG EC tablet Take 75 mg by mouth 2 (two) times daily.      ofloxacin (OCUFLOX) 0.3 % ophthalmic solution SMARTSIG:In Ear(s)      ondansetron (ZOFRAN) 8 MG tablet Take 1 tablet (8 mg total) by mouth every 8 (eight) hours as needed for Nausea. (Patient not taking: Reported on 11/9/2022) 30 tablet 1    VICTOZA 3-LINDSEY 0.6 mg/0.1 mL (18 mg/3 mL) PnIj pen Inject 3 mLs into the skin once daily.       No current facility-administered medications on file prior to visit.      Review of Systems   Constitutional:  Positive for fatigue. Negative for appetite change and fever.   HENT:  Negative for mouth sores.    Eyes: Negative.    Respiratory:  Negative for cough and shortness of breath.    Cardiovascular:  Negative for chest pain and leg swelling.    Gastrointestinal:  Positive for diarrhea and nausea. Negative for abdominal distention, abdominal pain, constipation, vomiting and reflux.   Endocrine:        Hot flashes   Genitourinary:  Negative for difficulty urinating, dysuria and hematuria.   Musculoskeletal:  Negative for arthralgias and back pain.   Integumentary:  Negative for rash.   Neurological:  Negative for weakness and headaches.   Hematological:  Negative for adenopathy.   Psychiatric/Behavioral:  Negative for sleep disturbance. The patient is not nervous/anxious.        Vitals:    11/09/22 0933   BP: 128/80   Pulse: 67   Resp: 14   Temp: 97.9 °F (36.6 °C)      Physical Exam  Constitutional:       Appearance: Normal appearance. She is normal weight.   HENT:      Head: Normocephalic.      Nose: Nose normal.      Mouth/Throat:      Mouth: Mucous membranes are moist.   Eyes:      Extraocular Movements: Extraocular movements intact.      Conjunctiva/sclera: Conjunctivae normal.   Cardiovascular:      Rate and Rhythm: Normal rate and regular rhythm.      Heart sounds: Normal heart sounds.   Pulmonary:      Effort: Pulmonary effort is normal.      Breath sounds: Normal breath sounds.   Chest:   Breasts:     Right: Normal.      Comments: Left breast with incision outer quadrant, left axillary incision, no palpable mass in either breast and no adenopathy  Abdominal:      General: Abdomen is flat. Bowel sounds are normal. There is no distension.      Palpations: Abdomen is soft.      Tenderness: There is no abdominal tenderness.   Musculoskeletal:         General: Normal range of motion.      Right lower leg: No edema.      Left lower leg: No edema.   Skin:     General: Skin is warm and dry.   Neurological:      General: No focal deficit present.      Mental Status: She is alert and oriented to person, place, and time.   Psychiatric:         Attention and Perception: Attention normal.         Mood and Affect: Mood normal.         Speech: Speech normal.          Behavior: Behavior is cooperative.         Judgment: Judgment normal.     No visits with results within 1 Day(s) from this visit.   Latest known visit with results is:   Lab Visit on 11/04/2022   Component Date Value Ref Range Status    Sodium Level 11/04/2022 142  136 - 145 mmol/L Final    Potassium Level 11/04/2022 3.8  3.5 - 5.1 mmol/L Final    Chloride 11/04/2022 103  98 - 107 mmol/L Final    Carbon Dioxide 11/04/2022 26  23 - 31 mmol/L Final    Glucose Level 11/04/2022 168 (H)  82 - 115 mg/dL Final    Blood Urea Nitrogen 11/04/2022 13.9  9.8 - 20.1 mg/dL Final    Creatinine 11/04/2022 0.72  0.55 - 1.02 mg/dL Final    Calcium Level Total 11/04/2022 9.2  8.4 - 10.2 mg/dL Final    Protein Total 11/04/2022 6.5  5.8 - 7.6 gm/dL Final    Albumin Level 11/04/2022 3.9  3.4 - 4.8 gm/dL Final    Globulin 11/04/2022 2.6  2.4 - 3.5 gm/dL Final    Albumin/Globulin Ratio 11/04/2022 1.5  1.1 - 2.0 ratio Final    Bilirubin Total 11/04/2022 0.8  <=1.5 mg/dL Final    Alkaline Phosphatase 11/04/2022 59  40 - 150 unit/L Final    Alanine Aminotransferase 11/04/2022 16  0 - 55 unit/L Final    Aspartate Aminotransferase 11/04/2022 19  5 - 34 unit/L Final    eGFR 11/04/2022 >60  mls/min/1.73/m2 Final    WBC 11/04/2022 4.0 (L)  4.5 - 11.5 x10(3)/mcL Final    RBC 11/04/2022 4.46  4.20 - 5.40 x10(6)/mcL Final    Hgb 11/04/2022 13.4  12.0 - 16.0 gm/dL Final    Hct 11/04/2022 41.8  37.0 - 47.0 % Final    MCV 11/04/2022 93.7  80.0 - 94.0 fL Final    MCH 11/04/2022 30.0  27.0 - 31.0 pg Final    MCHC 11/04/2022 32.1 (L)  33.0 - 36.0 mg/dL Final    RDW 11/04/2022 13.2  11.5 - 17.0 % Final    Platelet 11/04/2022 217  130 - 400 x10(3)/mcL Final    MPV 11/04/2022 8.6  7.4 - 10.4 fL Final    Neut % 11/04/2022 63.2  % Final    Lymph % 11/04/2022 24.4  % Final    Mono % 11/04/2022 9.3  % Final    Eos % 11/04/2022 2.8  % Final    Basophil % 11/04/2022 0.3  % Final    Lymph # 11/04/2022 0.97  0.6 - 4.6 x10(3)/mcL Final    Neut # 11/04/2022 2.5   2.1 - 9.2 x10(3)/mcL Final    Mono # 11/04/2022 0.37  0.1 - 1.3 x10(3)/mcL Final    Eos # 11/04/2022 0.11  0 - 0.9 x10(3)/mcL Final    Baso # 11/04/2022 0.01  0 - 0.2 x10(3)/mcL Final    IG# 11/04/2022 0.00  0 - 0.04 x10(3)/mcL Final    IG% 11/04/2022 0.0  % Final       Assessment:       1. Malignant neoplasm of upper-outer quadrant of left breast in female, estrogen receptor positive    2. Osteopenia of necks of both femurs            Plan:    Patient with Stage IA breast cancer ([id number]). S/p lumpectomy on 3/21/22. Tumor measured 9mm, Grade 1, invasive mucinous carcinoma, margins clear with 4 negative SLNs, strongly ER and NM positive and Her2 negative, with low Ki67.  Due to favorable histology, per NCCN, no chemotherapy recommended.  Treatment recommended with AI X 5 years.  Patient completed adjuvant radiation on 6/1/22.    Currently patient is without any clinical symptoms or laboratory evidence of recurrence.   Started Femara on 6/15/22. At her last appointment she had numerous complaints -  nausea, dizziness, diarrhea, hot flashes and fogginess. We were not sure if all her complaints were from the Femara so it was held. Symptoms continued after holding so she restarted the Femara.   Mentions her symptoms are better.   Recent labs all good. Mild leukopenia but normal differential. No recent infections.   Baseline DEXA in February revealed osteopenia. We discussed different options and she declined Prolia initially. However, given her current GI symptoms I am hesitant to start Fosamax or Boniva at this time. She is willing now to proceed with Prolia. Will work on insurance approval.   Continue Caltrate + D twice daily for now.   Left MMG on 10/17/22 at [facility] benign. Due for bilateral MMG in 4 months which has already been ordered by Dr. Samano.   Continue surveillance visits every 3 months.     All questions answered at this time.      Zena Arnold, [facility]                  Discharged

## 2022-11-10 DIAGNOSIS — Z79.899 OTHER LONG TERM (CURRENT) DRUG THERAPY: ICD-10-CM

## 2022-11-10 DIAGNOSIS — M46.24 OSTEOMYELITIS OF VERTEBRA, THORACIC REGION: ICD-10-CM

## 2022-11-10 DIAGNOSIS — K29.70 GASTRITIS, UNSPECIFIED, WITHOUT BLEEDING: ICD-10-CM

## 2022-11-10 DIAGNOSIS — F41.9 ANXIETY DISORDER, UNSPECIFIED: ICD-10-CM

## 2022-11-10 DIAGNOSIS — K21.9 GASTRO-ESOPHAGEAL REFLUX DISEASE WITHOUT ESOPHAGITIS: ICD-10-CM

## 2022-11-10 DIAGNOSIS — F14.90 COCAINE USE, UNSPECIFIED, UNCOMPLICATED: ICD-10-CM

## 2022-11-10 DIAGNOSIS — E78.5 HYPERLIPIDEMIA, UNSPECIFIED: ICD-10-CM

## 2022-11-10 DIAGNOSIS — F17.210 NICOTINE DEPENDENCE, CIGARETTES, UNCOMPLICATED: ICD-10-CM

## 2022-11-10 DIAGNOSIS — F11.10 OPIOID ABUSE, UNCOMPLICATED: ICD-10-CM

## 2022-11-10 DIAGNOSIS — F19.19 OTHER PSYCHOACTIVE SUBSTANCE ABUSE WITH UNSPECIFIED PSYCHOACTIVE SUBSTANCE-INDUCED DISORDER: ICD-10-CM

## 2022-11-10 DIAGNOSIS — J44.9 CHRONIC OBSTRUCTIVE PULMONARY DISEASE, UNSPECIFIED: ICD-10-CM

## 2022-11-10 DIAGNOSIS — U07.1 COVID-19: ICD-10-CM

## 2022-11-10 DIAGNOSIS — B19.20 UNSPECIFIED VIRAL HEPATITIS C WITHOUT HEPATIC COMA: ICD-10-CM

## 2022-11-10 DIAGNOSIS — I10 ESSENTIAL (PRIMARY) HYPERTENSION: ICD-10-CM

## 2022-11-10 DIAGNOSIS — E03.9 HYPOTHYROIDISM, UNSPECIFIED: ICD-10-CM

## 2023-03-09 ENCOUNTER — EMERGENCY (EMERGENCY)
Facility: HOSPITAL | Age: 43
LOS: 0 days | Discharge: LEFT BEFORE DISCHARGE | End: 2023-03-10
Attending: EMERGENCY MEDICINE
Payer: MEDICAID

## 2023-03-09 VITALS
SYSTOLIC BLOOD PRESSURE: 124 MMHG | HEART RATE: 84 BPM | DIASTOLIC BLOOD PRESSURE: 78 MMHG | TEMPERATURE: 98 F | RESPIRATION RATE: 84 BRPM | OXYGEN SATURATION: 98 %

## 2023-03-09 DIAGNOSIS — Z53.29 PROCEDURE AND TREATMENT NOT CARRIED OUT BECAUSE OF PATIENT'S DECISION FOR OTHER REASONS: ICD-10-CM

## 2023-03-09 DIAGNOSIS — F17.200 NICOTINE DEPENDENCE, UNSPECIFIED, UNCOMPLICATED: ICD-10-CM

## 2023-03-09 DIAGNOSIS — Z86.79 PERSONAL HISTORY OF OTHER DISEASES OF THE CIRCULATORY SYSTEM: ICD-10-CM

## 2023-03-09 DIAGNOSIS — M79.89 OTHER SPECIFIED SOFT TISSUE DISORDERS: ICD-10-CM

## 2023-03-09 DIAGNOSIS — E03.9 HYPOTHYROIDISM, UNSPECIFIED: ICD-10-CM

## 2023-03-09 DIAGNOSIS — I83.029 VARICOSE VEINS OF LEFT LOWER EXTREMITY WITH ULCER OF UNSPECIFIED SITE: ICD-10-CM

## 2023-03-09 DIAGNOSIS — M19.90 UNSPECIFIED OSTEOARTHRITIS, UNSPECIFIED SITE: ICD-10-CM

## 2023-03-09 DIAGNOSIS — I83.019 VARICOSE VEINS OF RIGHT LOWER EXTREMITY WITH ULCER OF UNSPECIFIED SITE: ICD-10-CM

## 2023-03-09 DIAGNOSIS — L03.116 CELLULITIS OF LEFT LOWER LIMB: ICD-10-CM

## 2023-03-09 PROCEDURE — 99282 EMERGENCY DEPT VISIT SF MDM: CPT

## 2023-03-09 PROCEDURE — 99284 EMERGENCY DEPT VISIT MOD MDM: CPT

## 2023-03-09 RX ORDER — CEFAZOLIN SODIUM 1 G
2000 VIAL (EA) INJECTION ONCE
Refills: 0 | Status: DISCONTINUED | OUTPATIENT
Start: 2023-03-09 | End: 2023-03-09

## 2023-03-09 NOTE — ED PROVIDER NOTE - OBJECTIVE STATEMENT
42 year old female with a history of hypothyroidism, varicose veins and lymphedema on Lasix presents to the ED with lower extremity edema, redness and ulcerations. Patient reports that varicosities have been worsening over the past few months now causing large ulcerations on her left foot, left calf, and bilateral anterior legs. Left leg has become red, warm and itchy. Denies fever, chills, chest pain, shortness of breath, nausea, vomiting, diarrhea, dysuria.

## 2023-03-09 NOTE — ED PROVIDER NOTE - CARE PLAN
Principal Discharge DX:	Cellulitis of left lower extremity  Secondary Diagnosis:	Varicose veins of legs   1

## 2023-03-09 NOTE — ED PROVIDER NOTE - PROGRESS NOTE DETAILS
Patient requires duplex, labs and abx but US closed and patient is not willing to stay, will return in am. offered oral abx and refused

## 2023-03-09 NOTE — ED PROVIDER NOTE - PHYSICAL EXAMINATION
Physical Exam    Constitutional: No acute distress.   Eyes: Conjunctiva pink, Sclera clear, PERRLA, EOMI.  ENT: No sinus tenderness. No nasal discharge. No oropharyngeal erythema, edema, or exudates. Uvula midline.   Cardiovascular: Regular rate, regular rhythm. No noted murmurs rubs or gallops.  Respiratory: unlabored respiratory effort, clear to auscultation bilaterally no wheezing, rales or rhonchi  Gastrointestinal: Normal bowel sounds. soft, non distended, non-tender abdomen.   Musculoskeletal: supple neck, no midline tenderness. No joint or bony deformity.   Integumentary: warm, dry.   Lower extremities: bilateral lower extremity varicosities with multiple ulcerations of various depths. left leg erythematous and warm,.   Neurologic: awake, alert, cranial nerves II-XII grossly intact, extremities’ motor and sensory functions grossly intact  Psychiatric: appropriate mood, appropriate affect

## 2023-03-09 NOTE — ED PROVIDER NOTE - ATTENDING APP SHARED VISIT CONTRIBUTION OF CARE
42-year-old female with history of osteoarthritis, hypothyroidism, smoker, presents with pain and ulceration to the left leg. Patient states it has been progressive for the past year. She does not want me to examine the leg and states she will return in the morning for an ultrasound and further evaluation. Denies fever, chest pain, and all other acute symptoms. On exam, NAD, well-appearing, walking comfortably in room, no WOB/tachypnea, left dorsal leg superficial ulceration with mild surrounding erythema localized to area. Patient requesting to leave AMA at this time. She does not want prescriptions for antibiotics sent due to insurance reasons, she is working on insurance, and will speak with  in the morning when she comes back to the ED. She also does not want to stay for labs and will get ultrasound in the morning. She is alert and oriented and displays appropriate decision-making capacity and continues to decline above work-up.

## 2023-03-09 NOTE — ED PROVIDER NOTE - PATIENT PORTAL LINK FT
You can access the FollowMyHealth Patient Portal offered by [facility] by registering at the following website: [email or web address]. By joining [facility]’s FollowMyHealth portal, you will also be able to view your health information using other applications (apps) compatible with our system.

## 2023-03-09 NOTE — ED PROVIDER NOTE - NSFOLLOWUPINSTRUCTIONS_ED_ALL_ED_FT
Please return to the ED for further workup and treatment.    Cellulitis    WHAT YOU NEED TO KNOW:    Cellulitis is a skin infection caused by bacteria. Cellulitis may go away on its own or you may need treatment. Your healthcare provider may draw a [place] around the outside edges of your cellulitis. If your cellulitis spreads, your healthcare provider will see it outside of the [place]. Cellulitis        DISCHARGE INSTRUCTIONS:    Call 911 if:     You have sudden trouble breathing or chest pain.        Return to the emergency department if:     Your wound gets larger and more painful.       You feel a crackling under your skin when you touch it.      You have purple dots or bumps on your skin, or you see bleeding under your skin.      You have new swelling and pain in your legs.      The red, warm, swollen area gets larger.      You see red streaks coming from the infected area.    Contact your healthcare provider if:     You have a fever.      Your fever or pain does not go away or gets worse.      The area does not get smaller after 2 days of antibiotics.      Your skin is flaking or peeling off.      You have questions or concerns about your condition or care.    Medicines:     Antibiotics help treat the bacterial infection.       NSAIDs, such as ibuprofen, help decrease swelling, pain, and fever. NSAIDs can cause stomach bleeding or kidney problems in certain people. If you take blood thinner medicine, always ask if NSAIDs are safe for you. Always read the medicine label and follow directions. Do not give these medicines to children under 6 months of age without direction from your child's healthcare provider.      Acetaminophen decreases pain and fever. It is available without a doctor's order. Ask how much to take and how often to take it. Follow directions. Read the labels of all other medicines you are using to see if they also contain acetaminophen, or ask your doctor or pharmacist. Acetaminophen can cause liver damage if not taken correctly. Do not use more than 4 grams (4,000 milligrams) total of acetaminophen in one day.       Take your medicine as directed. Contact your healthcare provider if you think your medicine is not helping or if you have side effects. Tell him or her if you are allergic to any medicine. Keep a list of the medicines, vitamins, and herbs you take. Include the amounts, and when and why you take them. Bring the list or the pill bottles to follow-up visits. Carry your medicine list with you in case of an emergency.    Self-care:     Elevate the area above the level of your heart as often as you can. This will help decrease swelling and pain. Prop the area on pillows or blankets to keep it elevated comfortably.       Clean the area daily until the wound scabs over. Gently wash the area with soap and water. Pat dry. Use dressings as directed.       Place cool or warm, wet cloths on the area as directed. Use clean cloths and clean water. Leave it on the area until the cloth is room temperature. Pat the area dry with a clean, dry cloth. The cloths may help decrease pain.     Prevent cellulitis:     Do not scratch bug bites or areas of injury. You increase your risk for cellulitis by scratching these areas.       Do not share personal items, such as towels, clothing, and razors.       Clean exercise equipment with germ-killing  before and after you use it.      Wash your hands often. Use soap and water. Wash your hands after you use the bathroom, change a child's diapers, or sneeze. Wash your hands before you prepare or eat food. Use lotion to prevent dry, cracked skin. Handwashing           Wear pressure stockings as directed. You may be told to wear the stockings if you have peripheral edema. The stockings improve blood flow and decrease swelling.      Treat athlete’s foot. This can help prevent the spread of a bacterial skin infection.    Follow up with your healthcare provider within 3 days, or as directed: Your healthcare provider will check if your cellulitis is getting better. You may need different medicine. Write down your questions so you remember to ask them during your visits.       © Copyright [employer] 2019 All illustrations and images included in CareNotes are the copyrighted property of [employer].A[employer]., Inc. or [employer]

## 2023-03-10 NOTE — ED ADULT NURSE NOTE - NEGATIVE SCREENING
She is on such a low dose I went expect this as a side effect.  Effect of taking every other day.  For now restart once new prescription available this may help current symptoms.   .

## 2023-03-31 ENCOUNTER — INPATIENT (INPATIENT)
Facility: HOSPITAL | Age: 43
LOS: 4 days | Discharge: HOME | DRG: 383 | End: 2023-04-05
Attending: INTERNAL MEDICINE | Admitting: INTERNAL MEDICINE
Payer: MEDICAID

## 2023-03-31 VITALS
RESPIRATION RATE: 20 BRPM | SYSTOLIC BLOOD PRESSURE: 124 MMHG | OXYGEN SATURATION: 99 % | DIASTOLIC BLOOD PRESSURE: 80 MMHG | HEART RATE: 84 BPM | TEMPERATURE: 98 F

## 2023-03-31 DIAGNOSIS — L03.116 CELLULITIS OF LEFT LOWER LIMB: ICD-10-CM

## 2023-03-31 LAB
ALBUMIN SERPL ELPH-MCNC: 3.5 G/DL — SIGNIFICANT CHANGE UP (ref 3.5–5.2)
ALP SERPL-CCNC: 107 U/L — SIGNIFICANT CHANGE UP (ref 30–115)
ALT FLD-CCNC: 23 U/L — SIGNIFICANT CHANGE UP (ref 0–41)
ANION GAP SERPL CALC-SCNC: 9 MMOL/L — SIGNIFICANT CHANGE UP (ref 7–14)
APPEARANCE UR: CLEAR — SIGNIFICANT CHANGE UP
APTT BLD: 34.1 SEC — SIGNIFICANT CHANGE UP (ref 27–39.2)
AST SERPL-CCNC: 22 U/L — SIGNIFICANT CHANGE UP (ref 0–41)
BASE EXCESS BLDV CALC-SCNC: 5.9 MMOL/L — HIGH (ref -2–3)
BASOPHILS # BLD AUTO: 0.02 K/UL — SIGNIFICANT CHANGE UP (ref 0–0.2)
BASOPHILS NFR BLD AUTO: 0.3 % — SIGNIFICANT CHANGE UP (ref 0–1)
BILIRUB SERPL-MCNC: <0.2 MG/DL — SIGNIFICANT CHANGE UP (ref 0.2–1.2)
BILIRUB UR-MCNC: NEGATIVE — SIGNIFICANT CHANGE UP
BUN SERPL-MCNC: 8 MG/DL — LOW (ref 10–20)
CA-I SERPL-SCNC: 1.21 MMOL/L — SIGNIFICANT CHANGE UP (ref 1.15–1.33)
CALCIUM SERPL-MCNC: 9 MG/DL — SIGNIFICANT CHANGE UP (ref 8.4–10.5)
CHLORIDE SERPL-SCNC: 99 MMOL/L — SIGNIFICANT CHANGE UP (ref 98–110)
CO2 SERPL-SCNC: 29 MMOL/L — SIGNIFICANT CHANGE UP (ref 17–32)
COLOR SPEC: SIGNIFICANT CHANGE UP
CREAT SERPL-MCNC: 0.7 MG/DL — SIGNIFICANT CHANGE UP (ref 0.7–1.5)
DIFF PNL FLD: NEGATIVE — SIGNIFICANT CHANGE UP
EGFR: 111 ML/MIN/1.73M2 — SIGNIFICANT CHANGE UP
EOSINOPHIL # BLD AUTO: 0.31 K/UL — SIGNIFICANT CHANGE UP (ref 0–0.7)
EOSINOPHIL NFR BLD AUTO: 4.6 % — SIGNIFICANT CHANGE UP (ref 0–8)
GAS PNL BLDV: 136 MMOL/L — SIGNIFICANT CHANGE UP (ref 136–145)
GAS PNL BLDV: SIGNIFICANT CHANGE UP
GAS PNL BLDV: SIGNIFICANT CHANGE UP
GLUCOSE SERPL-MCNC: 78 MG/DL — SIGNIFICANT CHANGE UP (ref 70–99)
GLUCOSE UR QL: NEGATIVE — SIGNIFICANT CHANGE UP
HCG SERPL QL: NEGATIVE — SIGNIFICANT CHANGE UP
HCO3 BLDV-SCNC: 32 MMOL/L — HIGH (ref 22–29)
HCT VFR BLD CALC: 37.1 % — SIGNIFICANT CHANGE UP (ref 37–47)
HCT VFR BLDA CALC: 36 % — LOW (ref 39–51)
HGB BLD CALC-MCNC: 12.1 G/DL — LOW (ref 12.6–17.4)
HGB BLD-MCNC: 11.8 G/DL — LOW (ref 12–16)
IMM GRANULOCYTES NFR BLD AUTO: 0.3 % — SIGNIFICANT CHANGE UP (ref 0.1–0.3)
INR BLD: 0.97 RATIO — SIGNIFICANT CHANGE UP (ref 0.65–1.3)
KETONES UR-MCNC: NEGATIVE — SIGNIFICANT CHANGE UP
LACTATE BLDV-MCNC: 1.1 MMOL/L — SIGNIFICANT CHANGE UP (ref 0.5–2)
LEUKOCYTE ESTERASE UR-ACNC: NEGATIVE — SIGNIFICANT CHANGE UP
LYMPHOCYTES # BLD AUTO: 2.57 K/UL — SIGNIFICANT CHANGE UP (ref 1.2–3.4)
LYMPHOCYTES # BLD AUTO: 37.7 % — SIGNIFICANT CHANGE UP (ref 20.5–51.1)
MCHC RBC-ENTMCNC: 25.1 PG — LOW (ref 27–31)
MCHC RBC-ENTMCNC: 31.8 G/DL — LOW (ref 32–37)
MCV RBC AUTO: 78.8 FL — LOW (ref 81–99)
MONOCYTES # BLD AUTO: 0.65 K/UL — HIGH (ref 0.1–0.6)
MONOCYTES NFR BLD AUTO: 9.5 % — HIGH (ref 1.7–9.3)
NEUTROPHILS # BLD AUTO: 3.24 K/UL — SIGNIFICANT CHANGE UP (ref 1.4–6.5)
NEUTROPHILS NFR BLD AUTO: 47.6 % — SIGNIFICANT CHANGE UP (ref 42.2–75.2)
NITRITE UR-MCNC: NEGATIVE — SIGNIFICANT CHANGE UP
NRBC # BLD: 0 /100 WBCS — SIGNIFICANT CHANGE UP (ref 0–0)
PCO2 BLDV: 50 MMHG — HIGH (ref 39–42)
PH BLDV: 7.41 — SIGNIFICANT CHANGE UP (ref 7.32–7.43)
PH UR: 7 — SIGNIFICANT CHANGE UP (ref 5–8)
PLATELET # BLD AUTO: 332 K/UL — SIGNIFICANT CHANGE UP (ref 130–400)
PO2 BLDV: 45 MMHG — SIGNIFICANT CHANGE UP
POTASSIUM BLDV-SCNC: 4.3 MMOL/L — SIGNIFICANT CHANGE UP (ref 3.5–5.1)
POTASSIUM SERPL-MCNC: 4.6 MMOL/L — SIGNIFICANT CHANGE UP (ref 3.5–5)
POTASSIUM SERPL-SCNC: 4.6 MMOL/L — SIGNIFICANT CHANGE UP (ref 3.5–5)
PROT SERPL-MCNC: 7.5 G/DL — SIGNIFICANT CHANGE UP (ref 6–8)
PROT UR-MCNC: NEGATIVE — SIGNIFICANT CHANGE UP
PROTHROM AB SERPL-ACNC: 11 SEC — SIGNIFICANT CHANGE UP (ref 9.95–12.87)
RBC # BLD: 4.71 M/UL — SIGNIFICANT CHANGE UP (ref 4.2–5.4)
RBC # FLD: 14.1 % — SIGNIFICANT CHANGE UP (ref 11.5–14.5)
SAO2 % BLDV: 81.9 % — SIGNIFICANT CHANGE UP
SARS-COV-2 RNA SPEC QL NAA+PROBE: SIGNIFICANT CHANGE UP
SODIUM SERPL-SCNC: 137 MMOL/L — SIGNIFICANT CHANGE UP (ref 135–146)
SP GR SPEC: 1.01 — SIGNIFICANT CHANGE UP (ref 1.01–1.03)
UROBILINOGEN FLD QL: SIGNIFICANT CHANGE UP
WBC # BLD: 6.81 K/UL — SIGNIFICANT CHANGE UP (ref 4.8–10.8)
WBC # FLD AUTO: 6.81 K/UL — SIGNIFICANT CHANGE UP (ref 4.8–10.8)

## 2023-03-31 PROCEDURE — 93971 EXTREMITY STUDY: CPT | Mod: 26,LT

## 2023-03-31 PROCEDURE — 85652 RBC SED RATE AUTOMATED: CPT

## 2023-03-31 PROCEDURE — 80202 ASSAY OF VANCOMYCIN: CPT

## 2023-03-31 PROCEDURE — 36415 COLL VENOUS BLD VENIPUNCTURE: CPT

## 2023-03-31 PROCEDURE — 85025 COMPLETE CBC W/AUTO DIFF WBC: CPT

## 2023-03-31 PROCEDURE — 84100 ASSAY OF PHOSPHORUS: CPT

## 2023-03-31 PROCEDURE — 99285 EMERGENCY DEPT VISIT HI MDM: CPT

## 2023-03-31 PROCEDURE — 86140 C-REACTIVE PROTEIN: CPT

## 2023-03-31 PROCEDURE — 87070 CULTURE OTHR SPECIMN AEROBIC: CPT

## 2023-03-31 PROCEDURE — 94640 AIRWAY INHALATION TREATMENT: CPT

## 2023-03-31 PROCEDURE — 73701 CT LOWER EXTREMITY W/DYE: CPT | Mod: 26,50,MA

## 2023-03-31 PROCEDURE — 83735 ASSAY OF MAGNESIUM: CPT

## 2023-03-31 PROCEDURE — 80053 COMPREHEN METABOLIC PANEL: CPT

## 2023-03-31 PROCEDURE — 93010 ELECTROCARDIOGRAM REPORT: CPT

## 2023-03-31 PROCEDURE — 87186 SC STD MICRODIL/AGAR DIL: CPT

## 2023-03-31 PROCEDURE — 87640 STAPH A DNA AMP PROBE: CPT

## 2023-03-31 PROCEDURE — 87077 CULTURE AEROBIC IDENTIFY: CPT

## 2023-03-31 PROCEDURE — 99222 1ST HOSP IP/OBS MODERATE 55: CPT

## 2023-03-31 PROCEDURE — 93925 LOWER EXTREMITY STUDY: CPT

## 2023-03-31 PROCEDURE — 85027 COMPLETE CBC AUTOMATED: CPT

## 2023-03-31 PROCEDURE — 87641 MR-STAPH DNA AMP PROBE: CPT

## 2023-03-31 RX ORDER — METHADONE HYDROCHLORIDE 40 MG/1
190 TABLET ORAL ONCE
Refills: 0 | Status: DISCONTINUED | OUTPATIENT
Start: 2023-03-31 | End: 2023-03-31

## 2023-03-31 RX ORDER — ALPRAZOLAM 0.25 MG
2 TABLET ORAL ONCE
Refills: 0 | Status: DISCONTINUED | OUTPATIENT
Start: 2023-03-31 | End: 2023-03-31

## 2023-03-31 RX ORDER — ALPRAZOLAM 0.25 MG
2 TABLET ORAL THREE TIMES A DAY
Refills: 0 | Status: DISCONTINUED | OUTPATIENT
Start: 2023-03-31 | End: 2023-04-05

## 2023-03-31 RX ORDER — ENOXAPARIN SODIUM 100 MG/ML
40 INJECTION SUBCUTANEOUS EVERY 24 HOURS
Refills: 0 | Status: DISCONTINUED | OUTPATIENT
Start: 2023-04-01 | End: 2023-04-05

## 2023-03-31 RX ORDER — SODIUM CHLORIDE 9 MG/ML
1000 INJECTION INTRAMUSCULAR; INTRAVENOUS; SUBCUTANEOUS ONCE
Refills: 0 | Status: COMPLETED | OUTPATIENT
Start: 2023-03-31 | End: 2023-03-31

## 2023-03-31 RX ORDER — ALBUTEROL 90 UG/1
2 AEROSOL, METERED ORAL EVERY 6 HOURS
Refills: 0 | Status: DISCONTINUED | OUTPATIENT
Start: 2023-03-31 | End: 2023-04-05

## 2023-03-31 RX ORDER — VANCOMYCIN HCL 1 G
1000 VIAL (EA) INTRAVENOUS ONCE
Refills: 0 | Status: COMPLETED | OUTPATIENT
Start: 2023-03-31 | End: 2023-03-31

## 2023-03-31 RX ORDER — ACETAMINOPHEN 500 MG
650 TABLET ORAL EVERY 6 HOURS
Refills: 0 | Status: DISCONTINUED | OUTPATIENT
Start: 2023-03-31 | End: 2023-04-01

## 2023-03-31 RX ORDER — BUDESONIDE AND FORMOTEROL FUMARATE DIHYDRATE 160; 4.5 UG/1; UG/1
2 AEROSOL RESPIRATORY (INHALATION)
Refills: 0 | Status: DISCONTINUED | OUTPATIENT
Start: 2023-03-31 | End: 2023-04-05

## 2023-03-31 RX ORDER — PIPERACILLIN AND TAZOBACTAM 4; .5 G/20ML; G/20ML
3.38 INJECTION, POWDER, LYOPHILIZED, FOR SOLUTION INTRAVENOUS ONCE
Refills: 0 | Status: DISCONTINUED | OUTPATIENT
Start: 2023-03-31 | End: 2023-03-31

## 2023-03-31 RX ORDER — METHADONE HYDROCHLORIDE 40 MG/1
190 TABLET ORAL DAILY
Refills: 0 | Status: DISCONTINUED | OUTPATIENT
Start: 2023-04-01 | End: 2023-04-01

## 2023-03-31 RX ORDER — KETOROLAC TROMETHAMINE 30 MG/ML
30 SYRINGE (ML) INJECTION ONCE
Refills: 0 | Status: DISCONTINUED | OUTPATIENT
Start: 2023-03-31 | End: 2023-03-31

## 2023-03-31 RX ORDER — MORPHINE SULFATE 50 MG/1
4 CAPSULE, EXTENDED RELEASE ORAL ONCE
Refills: 0 | Status: DISCONTINUED | OUTPATIENT
Start: 2023-03-31 | End: 2023-03-31

## 2023-03-31 RX ORDER — ACETAMINOPHEN 500 MG
650 TABLET ORAL ONCE
Refills: 0 | Status: COMPLETED | OUTPATIENT
Start: 2023-03-31 | End: 2023-03-31

## 2023-03-31 RX ORDER — LANOLIN ALCOHOL/MO/W.PET/CERES
3 CREAM (GRAM) TOPICAL AT BEDTIME
Refills: 0 | Status: DISCONTINUED | OUTPATIENT
Start: 2023-03-31 | End: 2023-04-05

## 2023-03-31 RX ADMIN — Medication 2 MILLIGRAM(S): at 14:09

## 2023-03-31 RX ADMIN — Medication 30 MILLIGRAM(S): at 13:45

## 2023-03-31 RX ADMIN — SODIUM CHLORIDE 1000 MILLILITER(S): 9 INJECTION INTRAMUSCULAR; INTRAVENOUS; SUBCUTANEOUS at 13:45

## 2023-03-31 RX ADMIN — Medication 250 MILLIGRAM(S): at 13:45

## 2023-03-31 RX ADMIN — METHADONE HYDROCHLORIDE 190 MILLIGRAM(S): 40 TABLET ORAL at 18:22

## 2023-03-31 RX ADMIN — Medication 2 MILLIGRAM(S): at 23:06

## 2023-03-31 NOTE — H&P ADULT - NSHPREVIEWOFSYSTEMS_GEN_ALL_CORE
Review of Systems:  •	CONSTITUTIONAL - No fever, No diaphoresis, No weight change  •	SKIN - lower extremity lesions   •	HEMATOLOGIC - No abnormal bleeding or bruising  •	EYES - No eye pain, No blurred vision  •	ENT - No change in hearing, No sore throat, No neck pain, No rhinorrhea, No ear pain  •	RESPIRATORY - No shortness of breath, No cough  •	CARDIAC -No chest pain, No palpitations  •	GI - No abdominal pain, No nausea, No vomiting, No diarrhea, No constipation, No bright red blood per rectum or melena. No flank pain  •                 - No dysuria, frequency, hematuria.   •	MUSCULOSKELETAL - Low Extremity Pain   •	NEUROLOGIC - No numbness, No focal weakness, No headache, No dizziness  All other systems negative, unless specified in HPI

## 2023-03-31 NOTE — H&P ADULT - NSHPPHYSICALEXAM_GEN_ALL_CORE
VITALS  T(C): 37.1 (03-31-23 @ 19:42), Max: 37.1 (03-31-23 @ 19:00)  HR: 82 (03-31-23 @ 19:42) (82 - 84)  BP: 128/72 (03-31-23 @ 19:42) (124/80 - 128/72)  RR: 18 (03-31-23 @ 19:42) (18 - 20)  SpO2: 99% (03-31-23 @ 11:56) (99% - 99%)    PHYSICAL EXAM  GENERAL: NAD, well-developed  NEURO: AO x3, PERRLA, EOMI, motor strength intact in 4/4 extremities, sensation intact  HEAD:  Atraumatic, Normocephalic  EYES: conjunctiva and sclera clear  NECK: Supple, No JVD, no lymphadenopathy, no thyromegaly  CHEST/LUNG: Clear to auscultation bilaterally; No wheezes, rales or rhonchi  HEART: Regular rate and rhythm; No murmurs, rubs, or gallops  ABDOMEN: Soft, Nontender, Nondistended; Bowel sounds present, no masses.  EXTREMITIES: chronic venous stasis changes to b/l LE; 2+ peripheral pulses  SKIN: Ulcer on dorsum of left foot (2 cm with surrounding erythema draining clear to purulent fluid); 2 venous stasis ulcers on medial calf (3 cm and 4 cm; 4 cm ulceration w/ surrounding fluctuance and erythema draining clear to purulent fluid)  PSYCH: affect appropriate

## 2023-03-31 NOTE — ED PROVIDER NOTE - PHYSICAL EXAMINATION
PHYSICAL EXAM: I have reviewed current vital signs.  GENERAL: NAD, well-nourished; well-developed.  HEAD:  Normocephalic, atraumatic.  EYES: EOMI, PERRL, conjunctiva and sclera clear.  ENT: MMM, no erythema/exudates.  NECK: Supple, no JVD.  CHEST/LUNG: Clear to auscultation bilaterally; no wheezes, rales, or rhonchi.  HEART: Regular rate and rhythm, normal S1 and S2; no murmurs, rubs, or gallops.  ABDOMEN: Soft, nontender, nondistended.  EXTREMITIES:  2+ peripheral pulses; no clubbing, cyanosis, or edema.  PSYCH: Cooperative, appropriate, normal mood and affect.  NEUROLOGY: A&O x 3. Motor 5/5. Sensory intact. No focal neurological deficits. CN II - XII intact. (-) dysmetria, facial droop, pronator drift.  SKIN: Left lower extremity with 3 times venous ulcers .  Ulcer on dorsum of left foot 2 cm with surrounding erythema draining clear to purulent fluid.  2 times venous stasis ulcers on medial calf 3 cm and 4 cm respectively 4 cm ulceration with surrounding fluctuance and erythema draining clear to purulent fluid.

## 2023-03-31 NOTE — ED PROVIDER NOTE - OBJECTIVE STATEMENT
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  Patient states for the past month she has had multiple venous ulcers and has for the past 2 weeks.  Patient saw primary care physician who prescribed Silvadene ointment with no improvement of symptoms.  Patient afebrile on exam able to ambulate on the leg with pain.

## 2023-03-31 NOTE — ED ADULT NURSE NOTE - OBJECTIVE STATEMENT
pt has closed wound on top of left foot. states it opened up yesterday in the shower. not actively bleeding at this time. pt also has left inner calf wound that has been nonhealing for 2 months. states her pmd gave her silvadene and it won't heal. also has wound on top of left lower leg. yellow drainage noted from both lower extremity wounds.

## 2023-03-31 NOTE — ED PROVIDER NOTE - PROGRESS NOTE DETAILS
melany- Burn consulted for leg wounds.  Patient currently using methadone called methadone center telephone number# [phone] spoke to nurse  confirmed patient has received no dose of methadone since March 23 and should have run out of her doses given on March 23 by March 28. ER: pt signed out to Dr. Felton.

## 2023-03-31 NOTE — ED ADULT NURSE NOTE - NSIMPLEMENTINTERV_GEN_ALL_ED
Implemented All Fall Risk Interventions:  [place] to call system. Call bell, personal items and telephone within reach. Instruct patient to call for assistance. Room bathroom lighting operational. Non-slip footwear when patient is off stretcher. Physically safe environment: no spills, clutter or unnecessary equipment. Stretcher in lowest position, wheels locked, appropriate side rails in place. Provide visual cue, wrist band, yellow gown, etc. Monitor gait and stability. Monitor for mental status changes and reorient to person, place, and time. Review medications for side effects contributing to fall risk. Reinforce activity limits and safety measures with patient and family.

## 2023-03-31 NOTE — ED PROVIDER NOTE - CLINICAL SUMMARY MEDICAL DECISION MAKING FREE TEXT BOX
42-year-old female past medical history of IV drug use in remission for past 15 years on methadone osteomyelitis of spine presents with left lower extremity pain and swelling had multiple venous ulcers patient afebrile able to ambulate without pain well appearing, NAD, non toxic. NCAT PERRLA EOMI neck supple non tender normal wob cta bl rrr abdomen s nt nd no rebound no guarding WWPx4 neuro non focal left lower extremity 3 times venous ulcers ulcer of the dorsum of left foot 2 cm with surrounding erythema 2 times venous stasis ulcers on medial.

## 2023-03-31 NOTE — ED PROVIDER NOTE - NS ED ROS FT
Constitutional:  No fevers or chills.  Eyes:  No visual changes, eye pain, or discharge.  ENT:  No hearing changes. No sore throat.  Neck:  No neck pain or stiffness.  Cardiac:  No CP or edema.  Resp:  No cough or SOB. No hemoptysis.   GI:  No nausea, vomiting, diarrhea, or abdominal pain.  :  No dysuria, frequency, or hematuria.  MSK:  (+) leg pain  Neuro:  No headache, dizziness, or weakness.  Skin: (+) ulcer

## 2023-03-31 NOTE — H&P ADULT - ASSESSMENT
42F w/ h/o opiate use disorder (on methadone), IVDU (reports last use 15yrs ago), anxiety, and chronic venous stasis p/w LLE pain, swelling, and ulcers. Admitted to medicine for LLE infected ulcers.    #LLE Infected Ulcers  Initially p/w LLE ulcers a/w pain, swelling, and erythema. No sepsis present on admission (SIRS negative). CT B/L LE demonstrated possible cutaneous abscess near focal skin ulceration at the posterior medial aspect of the left knee, but no evidence of necrotizing infection. Venous duplex LLE negative for DVT or superificial thrombophlebitis (pre-forbes read).  - c/w vancomycin 1g IV Q12H (adjust dose once pt's weight available)  - obtain MRSA, ESR, CRP  - f/u BCx  - c/w morphine 4mg IV Q8H PRN severe pain  - consider pain management consult if pain poorly controlled (ORT 10 suggess high risk of opioid abuse; likely to have very high opiate tolerance based on methadone dose)  - Burn consulted (evaluate for debridement and/or abscess drainage); f/u recs  - ID consulted; f/u recs    #Opiate Use Disorder  #IVDU, h/o  Reports h/o opiate/IVDU. Last IVDU over 15yrs ago per patient. Now on methadone 190mg qd. Dose verified by ED provider (methadone clinic number is [phone]). Of note, per ED provider documentation, pt last received methadone 3/23 and would have run out of her doses by 3/28.  - c/w methadone 190mg PO QD    #Anxiety  Managed via alprazolam 2 tid. Dose verified via I-STOP (Reference #: [id number])  - c/w alprazolam 2mg PO TID    DVT PPX: Lovenox 40mg SQ QD   GI PPX: none indicated  DIET: DASH/TLC  ACTIVITY: IAT  CODE STATUS: Full Code  DISPOSITION: From Home    PENDING: ID consult; Burn consult 42F w/ h/o opiate use disorder (on methadone), IVDU (reports last use 15yrs ago), anxiety, and chronic venous stasis p/w LLE pain, swelling, and ulcers. Admitted to medicine for LLE infected ulcers.    #LLE Infected Ulcers  Initially p/w LLE ulcers a/w pain, swelling, and erythema. No sepsis present on admission (SIRS negative). CT B/L LE demonstrated possible cutaneous abscess near focal skin ulceration at the posterior medial aspect of the left knee, but no evidence of necrotizing infection. Venous duplex LLE negative for DVT or superificial thrombophlebitis (pre-forbes read).  - c/w vancomycin 1g IV Q12H (adjust dose once pt's weight available)  - obtain MRSA, ESR, CRP  - f/u BCx  - consider pain management consult if pain poorly controlled (ORT 10 suggess high risk of opioid abuse; likely to have very high opiate tolerance based on methadone dose)  - Burn consulted (evaluate for debridement and/or abscess drainage); f/u recs  - ID consulted; f/u recs    #Opiate Use Disorder  #IVDU, h/o  Reports h/o opiate/IVDU. Last IVDU over 15yrs ago per patient. Now on methadone 190mg qd. Dose verified by ED provider (methadone clinic number is [phone]). Of note, per ED provider documentation, pt last received methadone 3/23 and would have run out of her doses by 3/28.  - c/w methadone 190mg PO QD    #Anxiety  Managed via alprazolam 2 tid. Dose verified via I-STOP (Reference #: [id number])  - c/w alprazolam 2mg PO TID    DVT PPX: Lovenox 40mg SQ QD   GI PPX: none indicated  DIET: DASH/TLC  ACTIVITY: IAT  CODE STATUS: Full Code  DISPOSITION: From Home    PENDING: ID consult; Burn consult 42F w/ h/o opiate use disorder (on methadone), IVDU (reports last use 15yrs ago), anxiety, and chronic venous stasis p/w LLE pain, swelling, and ulcers. Admitted to medicine for LLE infected ulcers.    #LLE Infected Ulcers  Initially p/w LLE ulcers a/w pain, swelling, and erythema. No sepsis present on admission (SIRS negative). CT B/L LE demonstrated possible cutaneous abscess near focal skin ulceration at the posterior medial aspect of the left knee, but no evidence of necrotizing infection. Venous duplex LLE negative for DVT or superificial thrombophlebitis (pre-forbes read).  - c/w vancomycin 1g IV Q12H (adjust dose once pt's weight available)  - c/w Tylenol 975mg PO Q8H  - c/w diclofenac 50mg PO Q8H PRN severe pain  - avoid narcotics if possible; consider pain management consult if pain poorly controlled (ORT 10 suggets high risk of opioid abuse; pt interested in minimizing opiate use if possible)  - obtain MRSA, ESR, CRP  - f/u BCx  - Burn consulted (evaluate for debridement and/or abscess drainage); f/u recs  - ID consulted; f/u recs    #Opiate Use Disorder  #IVDU, h/o  Reports h/o opiate/IVDU. Last IVDU over 15yrs ago per patient. Now on methadone 190mg qd. Dose verified by ED provider (methadone clinic number is [phone]). Of note, per ED provider documentation, pt last received methadone 3/23 and would have run out of her doses by 3/28.  - c/w methadone 190mg PO QD    #Anxiety  Managed via alprazolam 2 tid. Dose verified via I-STOP (Reference #: [id number])  - c/w alprazolam 2mg PO TID    DVT PPX: Lovenox 40mg SQ QD   GI PPX: none indicated  DIET: DASH/TLC  ACTIVITY: IAT  CODE STATUS: Full Code  DISPOSITION: From Home    PENDING: ID consult; Burn consult

## 2023-03-31 NOTE — ED ADULT NURSE REASSESSMENT NOTE - NS ED NURSE REASSESS COMMENT FT1
pt walked off unit after MD told pt not to leave unit. pt refused to have abx hooked back to IV until she saw MD. pt educated that she is delaying her care and infusion of IV abx. pt also educated multiple times she is not to get up without assistance due to medications given to her. MA in place. will wait for pt to return to administer methadone and complete iv vanco. pt still pending iv zosyn. MD mosley aware of situation.

## 2023-03-31 NOTE — H&P ADULT - ATTENDING COMMENTS
42F w/ h/o opiate use disorder (on methadone), IVDU (reports last use 15yrs ago), anxiety, and chronic venous stasis p/w LLE pain, swelling, and ulcers.  Admitted  for  Chronic  Venous Stasis Likely Ulcers  complicated by suspected  Cellulitis.  Start on  IV abx, Burn evaluation and ID evaluation.  Pain  control  with NSAIDs  avoid  Narcotics given recent relapse. Confirm Methadone  dosing  with clinic. Patient has been non compliant with  compression stockings and leg elevation. Education given at bedside.  recommend  decreasing the  Compression stocking poundage. continue methadone   as per  clinic  dosing. Patients home life is stressful  given recent divorces  that lead to relapse. Case management input will be appreciated. wound care as per burn.    VITAL SIGNS: I have reviewed nursing notes and confirm.  CONSTITUTIONAL: non-toxic, well appearing  SKIN: no rash, no petechiae.  EYES: EOMI, pink conjunctiva, anicteric  ENT: tongue midline, no exudates, MMM  NECK: Supple; no meningismus, no JVD  CARD: RRR, no murmurs, equal radial pulses bilaterally 2+  RESP: CTAB, no respiratory distress  ABD: Soft, non-tender, non-distended, no peritoneal signs, no HSM, no CVA tenderness  EXT: Normal ROM x4. lower extremity swelling. redness and pain on palpation.   dermatosclerotic changes   NEURO: Alert, oriented x3. , equal strength bilaterally    Seen on  03/31/23

## 2023-03-31 NOTE — ED PROVIDER NOTE - ATTENDING CONTRIBUTION TO CARE
42-year-old female with a past medical history significant for hypertension hyperlipidemia GERD opioid abuse history of IV drug abuse active smoker hypothyroidism hep C status posttreatment methadone dependent who presents to the ED with lower extremity ulcers.  Patient states that she has been having nose ulcers for couple of weeks however have gotten progressively worse.  Patient endorses chills and fevers during the past two weeks. Pt denies any numbness tingling or any trauma.     VITAL SIGNS: I have reviewed nursing notes and confirm.  CONSTITUTIONAL: non-toxic, well appearing  SKIN: no rash, no petechiae.  EYES: EOMI, pink conjunctiva, anicteric  ENT: tongue midline, no exudates, MMM  NECK: Supple; no meningismus, no JVD  CARD: RRR, no murmurs, equal radial pulses bilaterally 2+  RESP: CTAB, no respiratory distress  ABD: Soft, non-tender, non-distended, no peritoneal signs, no HSM, no CVA tenderness  EXT: Normal ROM x4. lower extremity swelling. redness and pain on palpation. dp +2. sensation intact.   NEURO: Alert, oriented x3. CN2-12 intact, equal strength bilaterally    42 y r old f that presents with concern for infection in the lower extremity. labs, imaging, pain management, iv antibiotics. reassess. dispo pending.

## 2023-03-31 NOTE — H&P ADULT - NSICDXPASTMEDICALHX_GEN_ALL_CORE_FT
PAST MEDICAL HISTORY:  H/O discitis     H/O intravenous drug use in remission     Opiate dependence

## 2023-03-31 NOTE — ED ADULT NURSE NOTE - NS ED NURSE REPORT GIVEN DT
Chart came to me closed. I see a podiatry appointment today, which she can keep, but I referred her to orthopedics not podiatry.    31-Mar-2023 18:26

## 2023-03-31 NOTE — ED PROVIDER NOTE - CARE PLAN
Principal Discharge DX:	Left leg cellulitis  Secondary Diagnosis:	Venous stasis ulcer of left lower leg with edema of left lower leg   1

## 2023-03-31 NOTE — H&P ADULT - HISTORY OF PRESENT ILLNESS
42F w/ h/o opiate use disorder (on methadone), IVDU (reports last use 15yrs ago), anxiety, and chronic venous stasis p/w LLE pain, swelling, and ulcers. Patient first noted pain and swelling 2 months ago. Ulcers gradually developed over these two months. Seen by PCP, who prescribed Silvadene ointment w/ no improvement in ulcers. No reported fevers, chills, CP, palpitations, SOB, abdominal pain, n/v/d, or dysuria.    In ED, pt HD stable on vitals. Labs unremarkable. Venous duplex of LLE negative for DVT (pre-forbes read). CT B/L LE demonstrates possible cutaneous abscess near focal skin ulceration at the posterior medial aspect of the left knee, but no evidence of necrotizing infection. S/P vancomycin 1g IV and morphine 4mg IV. Admitted to medicine for LLE infected ulcers.

## 2023-04-01 LAB
ALBUMIN SERPL ELPH-MCNC: 3.6 G/DL — SIGNIFICANT CHANGE UP (ref 3.5–5.2)
ALP SERPL-CCNC: 106 U/L — SIGNIFICANT CHANGE UP (ref 30–115)
ALT FLD-CCNC: 25 U/L — SIGNIFICANT CHANGE UP (ref 0–41)
ANION GAP SERPL CALC-SCNC: 14 MMOL/L — SIGNIFICANT CHANGE UP (ref 7–14)
AST SERPL-CCNC: 28 U/L — SIGNIFICANT CHANGE UP (ref 0–41)
BASOPHILS # BLD AUTO: 0.04 K/UL — SIGNIFICANT CHANGE UP (ref 0–0.2)
BASOPHILS NFR BLD AUTO: 0.6 % — SIGNIFICANT CHANGE UP (ref 0–1)
BILIRUB SERPL-MCNC: <0.2 MG/DL — SIGNIFICANT CHANGE UP (ref 0.2–1.2)
BUN SERPL-MCNC: 11 MG/DL — SIGNIFICANT CHANGE UP (ref 10–20)
CALCIUM SERPL-MCNC: 9.2 MG/DL — SIGNIFICANT CHANGE UP (ref 8.4–10.5)
CHLORIDE SERPL-SCNC: 99 MMOL/L — SIGNIFICANT CHANGE UP (ref 98–110)
CO2 SERPL-SCNC: 26 MMOL/L — SIGNIFICANT CHANGE UP (ref 17–32)
CREAT SERPL-MCNC: 0.8 MG/DL — SIGNIFICANT CHANGE UP (ref 0.7–1.5)
CRP SERPL-MCNC: 10.8 MG/L — HIGH
CRP SERPL-MCNC: 10.9 MG/L — HIGH
CULTURE RESULTS: SIGNIFICANT CHANGE UP
EGFR: 95 ML/MIN/1.73M2 — SIGNIFICANT CHANGE UP
EOSINOPHIL # BLD AUTO: 0.31 K/UL — SIGNIFICANT CHANGE UP (ref 0–0.7)
EOSINOPHIL NFR BLD AUTO: 4.9 % — SIGNIFICANT CHANGE UP (ref 0–8)
ERYTHROCYTE [SEDIMENTATION RATE] IN BLOOD: 44 MM/HR — HIGH (ref 0–20)
GLUCOSE SERPL-MCNC: 90 MG/DL — SIGNIFICANT CHANGE UP (ref 70–99)
HCT VFR BLD CALC: 39.2 % — SIGNIFICANT CHANGE UP (ref 37–47)
HGB BLD-MCNC: 12.2 G/DL — SIGNIFICANT CHANGE UP (ref 12–16)
IMM GRANULOCYTES NFR BLD AUTO: 0.5 % — HIGH (ref 0.1–0.3)
LYMPHOCYTES # BLD AUTO: 2.77 K/UL — SIGNIFICANT CHANGE UP (ref 1.2–3.4)
LYMPHOCYTES # BLD AUTO: 43.8 % — SIGNIFICANT CHANGE UP (ref 20.5–51.1)
MAGNESIUM SERPL-MCNC: 2 MG/DL — SIGNIFICANT CHANGE UP (ref 1.8–2.4)
MCHC RBC-ENTMCNC: 24.6 PG — LOW (ref 27–31)
MCHC RBC-ENTMCNC: 31.1 G/DL — LOW (ref 32–37)
MCV RBC AUTO: 79.2 FL — LOW (ref 81–99)
MONOCYTES # BLD AUTO: 0.6 K/UL — SIGNIFICANT CHANGE UP (ref 0.1–0.6)
MONOCYTES NFR BLD AUTO: 9.5 % — HIGH (ref 1.7–9.3)
NEUTROPHILS # BLD AUTO: 2.57 K/UL — SIGNIFICANT CHANGE UP (ref 1.4–6.5)
NEUTROPHILS NFR BLD AUTO: 40.7 % — LOW (ref 42.2–75.2)
NRBC # BLD: 0 /100 WBCS — SIGNIFICANT CHANGE UP (ref 0–0)
PHOSPHATE SERPL-MCNC: 4.8 MG/DL — SIGNIFICANT CHANGE UP (ref 2.1–4.9)
PLATELET # BLD AUTO: 329 K/UL — SIGNIFICANT CHANGE UP (ref 130–400)
POTASSIUM SERPL-MCNC: 4.7 MMOL/L — SIGNIFICANT CHANGE UP (ref 3.5–5)
POTASSIUM SERPL-SCNC: 4.7 MMOL/L — SIGNIFICANT CHANGE UP (ref 3.5–5)
PROT SERPL-MCNC: 7.8 G/DL — SIGNIFICANT CHANGE UP (ref 6–8)
RBC # BLD: 4.95 M/UL — SIGNIFICANT CHANGE UP (ref 4.2–5.4)
RBC # FLD: 14.3 % — SIGNIFICANT CHANGE UP (ref 11.5–14.5)
SODIUM SERPL-SCNC: 139 MMOL/L — SIGNIFICANT CHANGE UP (ref 135–146)
SPECIMEN SOURCE: SIGNIFICANT CHANGE UP
WBC # BLD: 6.32 K/UL — SIGNIFICANT CHANGE UP (ref 4.8–10.8)
WBC # FLD AUTO: 6.32 K/UL — SIGNIFICANT CHANGE UP (ref 4.8–10.8)

## 2023-04-01 PROCEDURE — 99232 SBSQ HOSP IP/OBS MODERATE 35: CPT

## 2023-04-01 PROCEDURE — 93925 LOWER EXTREMITY STUDY: CPT | Mod: 26

## 2023-04-01 PROCEDURE — 99252 IP/OBS CONSLTJ NEW/EST SF 35: CPT | Mod: GC

## 2023-04-01 RX ORDER — METHADONE HYDROCHLORIDE 40 MG/1
40 TABLET ORAL DAILY
Refills: 0 | Status: DISCONTINUED | OUTPATIENT
Start: 2023-04-01 | End: 2023-04-02

## 2023-04-01 RX ORDER — ACETAMINOPHEN 500 MG
975 TABLET ORAL EVERY 8 HOURS
Refills: 0 | Status: DISCONTINUED | OUTPATIENT
Start: 2023-04-01 | End: 2023-04-05

## 2023-04-01 RX ORDER — DICLOFENAC SODIUM 75 MG/1
50 TABLET, DELAYED RELEASE ORAL EVERY 8 HOURS
Refills: 0 | Status: DISCONTINUED | OUTPATIENT
Start: 2023-04-01 | End: 2023-04-03

## 2023-04-01 RX ORDER — ONDANSETRON 8 MG/1
4 TABLET, FILM COATED ORAL EVERY 6 HOURS
Refills: 0 | Status: DISCONTINUED | OUTPATIENT
Start: 2023-04-01 | End: 2023-04-05

## 2023-04-01 RX ORDER — MORPHINE SULFATE 50 MG/1
4 CAPSULE, EXTENDED RELEASE ORAL EVERY 8 HOURS
Refills: 0 | Status: DISCONTINUED | OUTPATIENT
Start: 2023-04-01 | End: 2023-04-01

## 2023-04-01 RX ORDER — METHADONE HYDROCHLORIDE 40 MG/1
190 TABLET ORAL DAILY
Refills: 0 | Status: DISCONTINUED | OUTPATIENT
Start: 2023-04-01 | End: 2023-04-05

## 2023-04-01 RX ORDER — HYDROXYZINE HCL 10 MG
25 TABLET ORAL EVERY 6 HOURS
Refills: 0 | Status: DISCONTINUED | OUTPATIENT
Start: 2023-04-01 | End: 2023-04-05

## 2023-04-01 RX ORDER — VANCOMYCIN HCL 1 G
1000 VIAL (EA) INTRAVENOUS EVERY 12 HOURS
Refills: 0 | Status: DISCONTINUED | OUTPATIENT
Start: 2023-04-01 | End: 2023-04-01

## 2023-04-01 RX ORDER — LOPERAMIDE HCL 2 MG
2 TABLET ORAL EVERY 6 HOURS
Refills: 0 | Status: DISCONTINUED | OUTPATIENT
Start: 2023-04-01 | End: 2023-04-05

## 2023-04-01 RX ORDER — MORPHINE SULFATE 50 MG/1
4 CAPSULE, EXTENDED RELEASE ORAL EVERY 4 HOURS
Refills: 0 | Status: DISCONTINUED | OUTPATIENT
Start: 2023-04-01 | End: 2023-04-01

## 2023-04-01 RX ORDER — MORPHINE SULFATE 50 MG/1
4 CAPSULE, EXTENDED RELEASE ORAL ONCE
Refills: 0 | Status: DISCONTINUED | OUTPATIENT
Start: 2023-04-01 | End: 2023-04-01

## 2023-04-01 RX ORDER — VANCOMYCIN HCL 1 G
1500 VIAL (EA) INTRAVENOUS EVERY 12 HOURS
Refills: 0 | Status: DISCONTINUED | OUTPATIENT
Start: 2023-04-01 | End: 2023-04-03

## 2023-04-01 RX ADMIN — ENOXAPARIN SODIUM 40 MILLIGRAM(S): 100 INJECTION SUBCUTANEOUS at 13:49

## 2023-04-01 RX ADMIN — Medication 2 MILLIGRAM(S): at 13:48

## 2023-04-01 RX ADMIN — MORPHINE SULFATE 4 MILLIGRAM(S): 50 CAPSULE, EXTENDED RELEASE ORAL at 12:22

## 2023-04-01 RX ADMIN — Medication 250 MILLIGRAM(S): at 05:14

## 2023-04-01 RX ADMIN — METHADONE HYDROCHLORIDE 40 MILLIGRAM(S): 40 TABLET ORAL at 17:47

## 2023-04-01 RX ADMIN — Medication 975 MILLIGRAM(S): at 13:48

## 2023-04-01 RX ADMIN — Medication 300 MILLIGRAM(S): at 17:31

## 2023-04-01 RX ADMIN — Medication 2 MILLIGRAM(S): at 05:14

## 2023-04-01 RX ADMIN — Medication 1 APPLICATION(S): at 22:35

## 2023-04-01 RX ADMIN — Medication 2 MILLIGRAM(S): at 21:41

## 2023-04-01 RX ADMIN — MORPHINE SULFATE 4 MILLIGRAM(S): 50 CAPSULE, EXTENDED RELEASE ORAL at 12:36

## 2023-04-01 RX ADMIN — MORPHINE SULFATE 4 MILLIGRAM(S): 50 CAPSULE, EXTENDED RELEASE ORAL at 06:06

## 2023-04-01 RX ADMIN — Medication 975 MILLIGRAM(S): at 14:31

## 2023-04-01 NOTE — PATIENT PROFILE ADULT - FALL HARM RISK - UNIVERSAL INTERVENTIONS
Bed in lowest position, wheels locked, appropriate side rails in place/Call bell, personal items and telephone in reach/Instruct patient to call for assistance before getting out of bed or chair/Non-slip footwear when patient is out of bed/[place] to call system/Physically safe environment - no spills, clutter or unnecessary equipment/Purposeful Proactive Rounding/Room/bathroom lighting operational, light cord in reach

## 2023-04-01 NOTE — CONSULT NOTE ADULT - ASSESSMENT
42F w/ h/o opiate use disorder (on methadone), IVDU (reports last use 15yrs ago), anxiety, and chronic venous stasis with multiple ulcers of LLE.    #ulcers of LLE  - local wound care with Silvadene and ace wrap  - Abx per ID 42F w/ h/o opiate use disorder (on methadone), IVDU (reports last use 15yrs ago), anxiety, and chronic venous stasis with multiple ulcers of LLE.    #ulcers of LLE  - local wound care with Silvadene and ace wrap  - no abscess noted at posterior knee; no surgical intervention necessary  - Abx per ID

## 2023-04-01 NOTE — PROGRESS NOTE ADULT - ATTENDING COMMENTS
42F w/ h/o opiate use disorder (on methadone), IVDU (reports last use 15yrs ago), anxiety, and chronic venous stasis p/w LLE pain, swelling, and ulcers. Admitted to medicine for LLE infected ulcers.    #LLE Infected Ulcers  Initially p/w LLE ulcers a/w pain, swelling, and erythema. No sepsis present on admission (SIRS negative). CT B/L LE demonstrated possible cutaneous abscess near focal skin ulceration at the posterior medial aspect of the left knee, but no evidence of necrotizing infection. Venous duplex LLE negative for DVT or superificial thrombophlebitis (pre-forbes read).  - c/w vancomycin 1g IV Q12H (adjust dose once pt's weight available)  - c/w Tylenol 975mg PO Q8H  - c/w diclofenac 50mg PO Q8H PRN severe pain  - avoid narcotics if possible; consider pain management consult if pain poorly controlled (ORT 10 suggets high risk of opioid abuse; pt interested in minimizing opiate use if possible)  - obtain MRSA, ESR, CRP  - f/u BCx  - Burn consulted (evaluate for debridement and/or abscess drainage); f/u recs  - ID consulted; f/u recs    #Opiate Use Disorder  #IVDU, h/o  Reports h/o opiate/IVDU. Last IVDU over 15yrs ago per patient. Now on methadone 190mg qd. Dose verified by ED provider (methadone clinic number is [phone]). Of note, per ED provider documentation, pt last received methadone 3/23 and would have run out of her doses by 3/28.  - c/w methadone 190mg PO QD    #Anxiety  Managed via alprazolam 2 tid. Dose verified via I-STOP (Reference #: [id number])  - c/w alprazolam 2mg PO TID    DVT PPX: Lovenox 40mg SQ QD   GI PPX: none indicated  DIET: DASH/TLC  ACTIVITY: IAT  CODE STATUS: Full Code  DISPOSITION: From Home    PENDING: ID consult; Burn consult    Chevy Patton MD  Attending Hospitalist

## 2023-04-01 NOTE — CONSULT NOTE ADULT - ASSESSMENT
ASSESSMENT  42F w/ h/o opiate use disorder (on methadone), IVDU (reports last use 15yrs ago), anxiety, and chronic venous stasis p/w LLE pain, swelling, and ulcers    IMPRESSION  #Chronic LLE ulcers with cellulitis  - CT Lower Extremity w/ IV Cont, Bilateral (03.31.23 @ 16:31): Possible 3 x 0.6 x 2 cm cutaneous abscess near focal skin ulceration at  the posterior medial aspect ofthe left knee. Adjacent superficial   thrombophlebitis is suspected. No evidence of necrotizing infection.    #Venous Stasis Ulcers  #Opioid use on methadone  #Hx of IVDU     #Abx allergy: Bactrim (Short breath)      RECOMMENDATIONS  - continue vancomycin - increase to 1500 mg q 12 hours -- check trough prior to 4th dose   - noted posterior knee area of induration -- possible phlebitis with induration, but will continue to monitor -- this lesion is not causing as much discomfort as two larger ulcers  - appreciate burn evaluation - local wound care - will follow-up cultures  - keep LLE elvated    Please call or message on Microsoft Teams if with any questions.  Spectra [id number]
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
## 2023-04-01 NOTE — CONSULT NOTE ADULT - SUBJECTIVE AND OBJECTIVE BOX
42F w/ h/o opiate use disorder (on methadone), IVDU (reports last use 15yrs ago), anxiety, and chronic venous stasis p/w LLE pain, swelling, and ulcers. Admitted to medicine for LLE infected ulcers. Addiction psychiatry consult placed for "methadone dose." Discussed with resident who placed consult. Consult was placed because patient reportedly gets methadone 190 mg qdaily but methadone clinic is closed over weekend and unable to verify dose at this time.    Recommend methadone 40 mg qdaily until dose can be confirmed with clinic on Monday 4/3. 42F w/ h/o opiate use disorder (on methadone), IVDU (reports last use 15yrs ago), anxiety, and chronic venous stasis p/w LLE pain, swelling, and ulcers. Admitted to medicine for LLE infected ulcers. Addiction psychiatry consult placed for "methadone dose." Discussed with resident who placed consult. Consult was placed because patient reportedly gets methadone 190 mg qdaily but methadone clinic is closed over weekend and unable to verify dose at this time.    Discussed with addiction psychiatry attending. Recommend methadone 40 mg qdaily until dose can be confirmed with clinic on Monday 4/3. 42F w/ h/o opiate use disorder (on methadone), IVDU (reports last use 15yrs ago), anxiety, and chronic venous stasis p/w LLE pain, swelling, and ulcers. Admitted to medicine for LLE infected ulcers. Addiction psychiatry consult placed for "methadone dose." Discussed with resident who placed consult. Consult was placed because patient reportedly gets methadone 190 mg qdaily but methadone clinic is closed over weekend and unable to verify dose at this time.    Discussed with addiction psychiatry attending. Recommend methadone 40 mg qdaily until dose can be confirmed with clinic on Monday 4/3. Once dose is confirmed with methadone clinic  (e.g. suspected to be 190 mg daily), may give the confirmed dose of methadone. 42F w/ h/o opiate use disorder (on methadone), IVDU (reports last use 15yrs ago), anxiety, and chronic venous stasis p/w LLE pain, swelling, and ulcers. Admitted to medicine for LLE infected ulcers. Addiction psychiatry consult placed for "methadone dose." Discussed with resident who placed consult. Consult was placed because patient reportedly gets methadone 190 mg qdaily but methadone clinic is closed over weekend and unable to verify dose at this time.    Discussed with addiction psychiatry attending. Recommend methadone 40 mg qdaily until dose can be confirmed with clinic on Monday 4/3. Once dose is confirmed with methadone clinic  (e.g. suspected to be 190 mg daily), may give the confirmed dose of methadone.    #Symptomatic Management of Opioid Withdrawals   -Nausea or vomiting: ondansetron (Zofran) 4mg PO or IV q6h PRN    -Muscle aches: Ibuprofen 400mg PO q6h PRN    -Autonomic Instability: Clonidine 0.1mg PO q6h PRN    -Diarrhea: Imodium 2-4mg PO q6h PRN for diarrhea (do not exceed 16 mg qdaily)    -Insomnia/anxiety: Atarax 50mg PO q6h PRN    -Nasal congestion: loratadine (Claritin) 10mg PO qdaily PRN
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
## 2023-04-01 NOTE — PROGRESS NOTE ADULT - ASSESSMENT
ASSESSMENT & PLAN:  42F w/ h/o opiate use disorder (on methadone), IVDU (reports last use 15yrs ago), anxiety, and chronic venous stasis p/w LLE pain, swelling, and ulcers. Admitted to medicine for LLE infected ulcers.    #LLE Infected Ulcers  Initially p/w LLE ulcers a/w pain, swelling, and erythema. No sepsis present on admission (SIRS negative). CT B/L LE demonstrated possible cutaneous abscess near focal skin ulceration at the posterior medial aspect of the left knee, but no evidence of necrotizing infection. Venous duplex LLE negative for DVT or superificial thrombophlebitis (pre-forbes read).  - c/w vancomycin 1g IV Q12H (adjust dose once pt's weight available)  - c/w Tylenol 975mg PO Q8H  - c/w diclofenac 50mg PO Q8H PRN severe pain  - avoid narcotics if possible; consider pain management consult if pain poorly controlled (ORT 10 suggets high risk of opioid abuse; pt interested in minimizing opiate use if possible)  - obtain MRSA, ESR, CRP  - f/u BCx  - Burn consulted (evaluate for debridement and/or abscess drainage); f/u recs  - ID consulted; f/u recs    #Opiate Use Disorder  #IVDU, h/o  Reports h/o opiate/IVDU. Last IVDU over 15yrs ago per patient. Now on methadone 190mg qd. Dose verified by ED provider (methadone clinic number is [phone]). Of note, per ED provider documentation, pt last received methadone 3/23 and would have run out of her doses by 3/28.  - c/w methadone 190mg PO QD    #Anxiety  Managed via alprazolam 2 tid. Dose verified via I-STOP (Reference #: [id number])  - c/w alprazolam 2mg PO TID    DVT PPX: Lovenox 40mg SQ QD   GI PPX: none indicated  DIET: DASH/TLC  ACTIVITY: IAT  CODE STATUS: Full Code  DISPOSITION: From Home    PENDING: ID consult; Burn consult

## 2023-04-02 LAB
MRSA PCR RESULT.: NEGATIVE — SIGNIFICANT CHANGE UP
VANCOMYCIN TROUGH SERPL-MCNC: 11.8 UG/ML — HIGH (ref 5–10)

## 2023-04-02 PROCEDURE — 99233 SBSQ HOSP IP/OBS HIGH 50: CPT

## 2023-04-02 RX ADMIN — Medication 2 MILLIGRAM(S): at 22:11

## 2023-04-02 RX ADMIN — Medication 300 MILLIGRAM(S): at 05:41

## 2023-04-02 RX ADMIN — DICLOFENAC SODIUM 50 MILLIGRAM(S): 75 TABLET, DELAYED RELEASE ORAL at 08:51

## 2023-04-02 RX ADMIN — DICLOFENAC SODIUM 50 MILLIGRAM(S): 75 TABLET, DELAYED RELEASE ORAL at 09:26

## 2023-04-02 RX ADMIN — ENOXAPARIN SODIUM 40 MILLIGRAM(S): 100 INJECTION SUBCUTANEOUS at 13:55

## 2023-04-02 RX ADMIN — METHADONE HYDROCHLORIDE 190 MILLIGRAM(S): 40 TABLET ORAL at 11:22

## 2023-04-02 RX ADMIN — Medication 2 MILLIGRAM(S): at 13:55

## 2023-04-02 RX ADMIN — Medication 2 MILLIGRAM(S): at 05:49

## 2023-04-02 RX ADMIN — Medication 300 MILLIGRAM(S): at 23:34

## 2023-04-02 RX ADMIN — Medication 1 APPLICATION(S): at 11:22

## 2023-04-03 LAB
ALBUMIN SERPL ELPH-MCNC: 3.5 G/DL — SIGNIFICANT CHANGE UP (ref 3.5–5.2)
ALP SERPL-CCNC: 112 U/L — SIGNIFICANT CHANGE UP (ref 30–115)
ALT FLD-CCNC: 28 U/L — SIGNIFICANT CHANGE UP (ref 0–41)
ANION GAP SERPL CALC-SCNC: 10 MMOL/L — SIGNIFICANT CHANGE UP (ref 7–14)
AST SERPL-CCNC: 27 U/L — SIGNIFICANT CHANGE UP (ref 0–41)
BILIRUB SERPL-MCNC: <0.2 MG/DL — SIGNIFICANT CHANGE UP (ref 0.2–1.2)
BUN SERPL-MCNC: 15 MG/DL — SIGNIFICANT CHANGE UP (ref 10–20)
CALCIUM SERPL-MCNC: 9.1 MG/DL — SIGNIFICANT CHANGE UP (ref 8.4–10.4)
CHLORIDE SERPL-SCNC: 101 MMOL/L — SIGNIFICANT CHANGE UP (ref 98–110)
CO2 SERPL-SCNC: 26 MMOL/L — SIGNIFICANT CHANGE UP (ref 17–32)
CREAT SERPL-MCNC: 0.9 MG/DL — SIGNIFICANT CHANGE UP (ref 0.7–1.5)
EGFR: 82 ML/MIN/1.73M2 — SIGNIFICANT CHANGE UP
GLUCOSE SERPL-MCNC: 89 MG/DL — SIGNIFICANT CHANGE UP (ref 70–99)
HCT VFR BLD CALC: 37.9 % — SIGNIFICANT CHANGE UP (ref 37–47)
HGB BLD-MCNC: 11.8 G/DL — LOW (ref 12–16)
MAGNESIUM SERPL-MCNC: 2.3 MG/DL — SIGNIFICANT CHANGE UP (ref 1.8–2.4)
MCHC RBC-ENTMCNC: 24.6 PG — LOW (ref 27–31)
MCHC RBC-ENTMCNC: 31.1 G/DL — LOW (ref 32–37)
MCV RBC AUTO: 79.1 FL — LOW (ref 81–99)
NRBC # BLD: 0 /100 WBCS — SIGNIFICANT CHANGE UP (ref 0–0)
PLATELET # BLD AUTO: 318 K/UL — SIGNIFICANT CHANGE UP (ref 130–400)
POTASSIUM SERPL-MCNC: 4.6 MMOL/L — SIGNIFICANT CHANGE UP (ref 3.5–5)
POTASSIUM SERPL-SCNC: 4.6 MMOL/L — SIGNIFICANT CHANGE UP (ref 3.5–5)
PROT SERPL-MCNC: 7.7 G/DL — SIGNIFICANT CHANGE UP (ref 6–8)
RBC # BLD: 4.79 M/UL — SIGNIFICANT CHANGE UP (ref 4.2–5.4)
RBC # FLD: 14.2 % — SIGNIFICANT CHANGE UP (ref 11.5–14.5)
SODIUM SERPL-SCNC: 137 MMOL/L — SIGNIFICANT CHANGE UP (ref 135–146)
WBC # BLD: 6.65 K/UL — SIGNIFICANT CHANGE UP (ref 4.8–10.8)
WBC # FLD AUTO: 6.65 K/UL — SIGNIFICANT CHANGE UP (ref 4.8–10.8)

## 2023-04-03 PROCEDURE — 99233 SBSQ HOSP IP/OBS HIGH 50: CPT

## 2023-04-03 RX ORDER — BACITRACIN ZINC NEOMYCIN SULFATE POLYMYXIN B SULFATE 400; 3.5; 5 [IU]/G; MG/G; [IU]/G
1 OINTMENT TOPICAL DAILY
Refills: 0 | Status: DISCONTINUED | OUTPATIENT
Start: 2023-04-03 | End: 2023-04-05

## 2023-04-03 RX ORDER — CEFEPIME 1 G/1
2000 INJECTION, POWDER, FOR SOLUTION INTRAMUSCULAR; INTRAVENOUS EVERY 8 HOURS
Refills: 0 | Status: DISCONTINUED | OUTPATIENT
Start: 2023-04-03 | End: 2023-04-05

## 2023-04-03 RX ORDER — CEFEPIME 1 G/1
INJECTION, POWDER, FOR SOLUTION INTRAMUSCULAR; INTRAVENOUS
Refills: 0 | Status: DISCONTINUED | OUTPATIENT
Start: 2023-04-03 | End: 2023-04-05

## 2023-04-03 RX ORDER — PANTOPRAZOLE SODIUM 20 MG/1
40 TABLET, DELAYED RELEASE ORAL
Refills: 0 | Status: DISCONTINUED | OUTPATIENT
Start: 2023-04-03 | End: 2023-04-05

## 2023-04-03 RX ORDER — CEFEPIME 1 G/1
2000 INJECTION, POWDER, FOR SOLUTION INTRAMUSCULAR; INTRAVENOUS ONCE
Refills: 0 | Status: COMPLETED | OUTPATIENT
Start: 2023-04-03 | End: 2023-04-03

## 2023-04-03 RX ORDER — BACITRACIN ZINC NEOMYCIN SULFATE POLYMYXIN B SULFATE 400; 3.5; 5 [IU]/G; MG/G; [IU]/G
1 OINTMENT TOPICAL DAILY
Refills: 0 | Status: DISCONTINUED | OUTPATIENT
Start: 2023-04-03 | End: 2023-04-03

## 2023-04-03 RX ORDER — GABAPENTIN 400 MG/1
100 CAPSULE ORAL EVERY 8 HOURS
Refills: 0 | Status: DISCONTINUED | OUTPATIENT
Start: 2023-04-03 | End: 2023-04-04

## 2023-04-03 RX ORDER — METHOCARBAMOL 500 MG/1
750 TABLET, FILM COATED ORAL EVERY 8 HOURS
Refills: 0 | Status: DISCONTINUED | OUTPATIENT
Start: 2023-04-03 | End: 2023-04-05

## 2023-04-03 RX ORDER — DICLOFENAC SODIUM 75 MG/1
50 TABLET, DELAYED RELEASE ORAL EVERY 8 HOURS
Refills: 0 | Status: DISCONTINUED | OUTPATIENT
Start: 2023-04-03 | End: 2023-04-04

## 2023-04-03 RX ORDER — VANCOMYCIN HCL 1 G
1250 VIAL (EA) INTRAVENOUS EVERY 8 HOURS
Refills: 0 | Status: DISCONTINUED | OUTPATIENT
Start: 2023-04-03 | End: 2023-04-03

## 2023-04-03 RX ORDER — MORPHINE SULFATE 50 MG/1
4 CAPSULE, EXTENDED RELEASE ORAL EVERY 4 HOURS
Refills: 0 | Status: DISCONTINUED | OUTPATIENT
Start: 2023-04-03 | End: 2023-04-04

## 2023-04-03 RX ORDER — CELECOXIB 200 MG/1
200 CAPSULE ORAL EVERY 12 HOURS
Refills: 0 | Status: DISCONTINUED | OUTPATIENT
Start: 2023-04-03 | End: 2023-04-03

## 2023-04-03 RX ADMIN — Medication 975 MILLIGRAM(S): at 13:24

## 2023-04-03 RX ADMIN — GABAPENTIN 100 MILLIGRAM(S): 400 CAPSULE ORAL at 12:24

## 2023-04-03 RX ADMIN — Medication 975 MILLIGRAM(S): at 12:24

## 2023-04-03 RX ADMIN — METHADONE HYDROCHLORIDE 190 MILLIGRAM(S): 40 TABLET ORAL at 12:36

## 2023-04-03 RX ADMIN — MORPHINE SULFATE 4 MILLIGRAM(S): 50 CAPSULE, EXTENDED RELEASE ORAL at 13:06

## 2023-04-03 RX ADMIN — MORPHINE SULFATE 4 MILLIGRAM(S): 50 CAPSULE, EXTENDED RELEASE ORAL at 12:36

## 2023-04-03 RX ADMIN — Medication 975 MILLIGRAM(S): at 22:08

## 2023-04-03 RX ADMIN — CEFEPIME 100 MILLIGRAM(S): 1 INJECTION, POWDER, FOR SOLUTION INTRAMUSCULAR; INTRAVENOUS at 12:22

## 2023-04-03 RX ADMIN — METHOCARBAMOL 750 MILLIGRAM(S): 500 TABLET, FILM COATED ORAL at 21:13

## 2023-04-03 RX ADMIN — Medication 2 MILLIGRAM(S): at 21:14

## 2023-04-03 RX ADMIN — Medication 2 MILLIGRAM(S): at 12:23

## 2023-04-03 RX ADMIN — BUDESONIDE AND FORMOTEROL FUMARATE DIHYDRATE 2 PUFF(S): 160; 4.5 AEROSOL RESPIRATORY (INHALATION) at 21:12

## 2023-04-03 RX ADMIN — Medication 2 MILLIGRAM(S): at 06:29

## 2023-04-03 RX ADMIN — Medication 1 APPLICATION(S): at 10:20

## 2023-04-03 RX ADMIN — CEFEPIME 100 MILLIGRAM(S): 1 INJECTION, POWDER, FOR SOLUTION INTRAMUSCULAR; INTRAVENOUS at 21:15

## 2023-04-03 RX ADMIN — METHOCARBAMOL 750 MILLIGRAM(S): 500 TABLET, FILM COATED ORAL at 12:25

## 2023-04-03 RX ADMIN — Medication 166.67 MILLIGRAM(S): at 09:18

## 2023-04-03 RX ADMIN — ENOXAPARIN SODIUM 40 MILLIGRAM(S): 100 INJECTION SUBCUTANEOUS at 12:26

## 2023-04-03 RX ADMIN — PANTOPRAZOLE SODIUM 40 MILLIGRAM(S): 20 TABLET, DELAYED RELEASE ORAL at 12:25

## 2023-04-03 RX ADMIN — Medication 975 MILLIGRAM(S): at 21:13

## 2023-04-03 RX ADMIN — GABAPENTIN 100 MILLIGRAM(S): 400 CAPSULE ORAL at 21:13
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
## 2023-04-03 NOTE — PROGRESS NOTE ADULT - ASSESSMENT
ASSESSMENT & PLAN:  42F w/ h/o opiate use disorder (on methadone), IVDU (reports last use 15yrs ago), anxiety, and chronic venous stasis p/w LLE pain, swelling, and ulcers. Admitted to medicine for LLE infected ulcers.    #LLE Infected Ulcers  Initially p/w LLE ulcers a/w pain, swelling, and erythema. No sepsis present on admission (SIRS negative). CT B/L LE demonstrated possible cutaneous abscess near focal skin ulceration at the posterior medial aspect of the left knee, but no evidence of necrotizing infection. Venous duplex LLE negative for DVT or superificial thrombophlebitis (pre-forbes read).  - c/w vancomycin 1g IV Q12H (adjust dose once pt's weight available)  - c/w Tylenol 975mg PO Q8H  - c/w diclofenac 50mg PO Q8H PRN severe pain  - avoid narcotics if possible; consider pain management consult if pain poorly controlled (ORT 10 suggets high risk of opioid abuse; pt interested in minimizing opiate use if possible)  - obtain MRSA, ESR, CRP  - f/u BCx  - Burn consulted (evaluate for debridement and/or abscess drainage); f/u recs  - ID consulted; f/u recs    #Opiate Use Disorder  #IVDU, h/o  Reports h/o opiate/IVDU. Last IVDU over 15yrs ago per patient. Now on methadone 190mg qd. Dose verified by ED provider (methadone clinic number is [phone]). Of note, per ED provider documentation, pt last received methadone 3/23 and would have run out of her doses by 3/28.  - c/w methadone 190mg PO QD    #Anxiety  Managed via alprazolam 2 tid. Dose verified via I-STOP (Reference #: [id number])  - c/w alprazolam 2mg PO TID    DVT PPX: Lovenox 40mg SQ QD   GI PPX: none indicated  DIET: DASH/TLC  ACTIVITY: IAT  CODE STATUS: Full Code  DISPOSITION: From Home    PENDING: ID consult; Burn consult   42F w/ PMHx of anxiety, chronic venous stasis, HO opiate use disorder/IVDU (on methadone 190mg; last used 13y ago) p/w LLE pain, swelling, and ulcers.     # LLE ulcers w/ cellulitis  CT B/L LE demonstrated possible cutaneous abscess near focal skin ulceration at the posterior medial aspect of the left knee, but no evidence of necrotizing infection. Venous duplex LLE negative for DVT or superificial thrombophlebitis.  - Wound culture w/ Pseudomonas + GBS  - C/w cefepime 2000mg IV QD (per ID)  - Wound care: Silvadene/Xeroform/ACE Q24H  - Burn eval: No abscess, no acute surgical intervention  - Pain control:       - Robaxin 750mg PO Q8H       - Gabapentin 100mg PO Q8H       - Tylenol 975mg PO Q8H       - Diclofenac 50mg PO Q8H PRN severe pain       - Morphine 4mg IVP Q4H PRN severe pain    # Opiate use disorder  # HO IVDU  Dose verified by ED provider (methadone clinic number is [phone]). Of note, per ED provider documentation, pt last received methadone 3/23 and would have run out of her doses by 3/28.  - C/w methadone 190mg PO QD    # Anxiety  Managed via alprazolam 2 tid. Dose verified via I-STOP (Reference #: [id number])  - C/w alprazolam 2mg PO TID    # To follow up  - Monitor response to abx    # Misc  - DVT Prophylaxis: enoxaparin Injectable  - GI Prophylaxis: pantoprazole    Tablet 40 milliGRAM(s) Oral before breakfast  - Diet: Diet, DASH/TLC  - Activity: Activity - Increase As Tolerated  - Code Status: Full

## 2023-04-03 NOTE — PROGRESS NOTE ADULT - ASSESSMENT
ASSESSMENT  42F w/ h/o opiate use disorder (on methadone), IVDU (reports last use 15yrs ago), anxiety, and chronic venous stasis p/w LLE pain, swelling, and ulcers    IMPRESSION  #Chronic LLE ulcers with cellulitis  - CT Lower Extremity w/ IV Cont, Bilateral (03.31.23 @ 16:31): Possible 3 x 0.6 x 2 cm cutaneous abscess near focal skin ulceration at  the posterior medial aspect ofthe left knee. Adjacent superficial thrombophlebitis is suspected. No evidence of necrotizing infection.  - wound Cx Group B Strep and Pseudomonas  #Venous Stasis Ulcers  #Opioid use on methadone  #Hx of IVDU     #Abx allergy: Bactrim (Short breath)      RECOMMENDATIONS  - cellulitis mildly improved from treated strep - but cx also with pseudomonas   - continue cefepime 2g q 8 hours to cover both  - Follow-up pseudomonas susceptibilities -- hopefully Fluoroquinolone + Augmentin to complete course   - local wound care3    Please call or message on [employer] Teams if with any questions.  Spectra [phone]

## 2023-04-04 LAB
-  AMIKACIN: SIGNIFICANT CHANGE UP
-  AZTREONAM: SIGNIFICANT CHANGE UP
-  CEFEPIME: SIGNIFICANT CHANGE UP
-  CEFTAZIDIME: SIGNIFICANT CHANGE UP
-  CIPROFLOXACIN: SIGNIFICANT CHANGE UP
-  GENTAMICIN: SIGNIFICANT CHANGE UP
-  IMIPENEM: SIGNIFICANT CHANGE UP
-  LEVOFLOXACIN: SIGNIFICANT CHANGE UP
-  MEROPENEM: SIGNIFICANT CHANGE UP
-  PIPERACILLIN/TAZOBACTAM: SIGNIFICANT CHANGE UP
-  TOBRAMYCIN: SIGNIFICANT CHANGE UP
ALBUMIN SERPL ELPH-MCNC: 3.6 G/DL — SIGNIFICANT CHANGE UP (ref 3.5–5.2)
ALP SERPL-CCNC: 97 U/L — SIGNIFICANT CHANGE UP (ref 30–115)
ALT FLD-CCNC: 25 U/L — SIGNIFICANT CHANGE UP (ref 0–41)
ANION GAP SERPL CALC-SCNC: 9 MMOL/L — SIGNIFICANT CHANGE UP (ref 7–14)
AST SERPL-CCNC: 27 U/L — SIGNIFICANT CHANGE UP (ref 0–41)
BASOPHILS # BLD AUTO: 0.03 K/UL — SIGNIFICANT CHANGE UP (ref 0–0.2)
BASOPHILS NFR BLD AUTO: 0.5 % — SIGNIFICANT CHANGE UP (ref 0–1)
BILIRUB SERPL-MCNC: 0.4 MG/DL — SIGNIFICANT CHANGE UP (ref 0.2–1.2)
BLANDM BLD POS QL PROBE: SIGNIFICANT CHANGE UP
BUN SERPL-MCNC: 16 MG/DL — SIGNIFICANT CHANGE UP (ref 10–20)
CALCIUM SERPL-MCNC: 8.8 MG/DL — SIGNIFICANT CHANGE UP (ref 8.4–10.5)
CHLORIDE SERPL-SCNC: 102 MMOL/L — SIGNIFICANT CHANGE UP (ref 98–110)
CO2 SERPL-SCNC: 25 MMOL/L — SIGNIFICANT CHANGE UP (ref 17–32)
CREAT SERPL-MCNC: 0.8 MG/DL — SIGNIFICANT CHANGE UP (ref 0.7–1.5)
CULTURE RESULTS: SIGNIFICANT CHANGE UP
EGFR: 95 ML/MIN/1.73M2 — SIGNIFICANT CHANGE UP
EOSINOPHIL # BLD AUTO: 0.34 K/UL — SIGNIFICANT CHANGE UP (ref 0–0.7)
EOSINOPHIL NFR BLD AUTO: 5.6 % — SIGNIFICANT CHANGE UP (ref 0–8)
GLUCOSE SERPL-MCNC: 75 MG/DL — SIGNIFICANT CHANGE UP (ref 70–99)
HCT VFR BLD CALC: 40.4 % — SIGNIFICANT CHANGE UP (ref 37–47)
HGB BLD-MCNC: 12.7 G/DL — SIGNIFICANT CHANGE UP (ref 12–16)
IMM GRANULOCYTES NFR BLD AUTO: 0.5 % — HIGH (ref 0.1–0.3)
LYMPHOCYTES # BLD AUTO: 2.61 K/UL — SIGNIFICANT CHANGE UP (ref 1.2–3.4)
LYMPHOCYTES # BLD AUTO: 43 % — SIGNIFICANT CHANGE UP (ref 20.5–51.1)
MAGNESIUM SERPL-MCNC: 2.1 MG/DL — SIGNIFICANT CHANGE UP (ref 1.8–2.4)
MCHC RBC-ENTMCNC: 25 PG — LOW (ref 27–31)
MCHC RBC-ENTMCNC: 31.4 G/DL — LOW (ref 32–37)
MCV RBC AUTO: 79.4 FL — LOW (ref 81–99)
METHOD TYPE: SIGNIFICANT CHANGE UP
METHOD TYPE: SIGNIFICANT CHANGE UP
MONOCYTES # BLD AUTO: 0.64 K/UL — HIGH (ref 0.1–0.6)
MONOCYTES NFR BLD AUTO: 10.5 % — HIGH (ref 1.7–9.3)
NEUTROPHILS # BLD AUTO: 2.42 K/UL — SIGNIFICANT CHANGE UP (ref 1.4–6.5)
NEUTROPHILS NFR BLD AUTO: 39.9 % — LOW (ref 42.2–75.2)
NRBC # BLD: 0 /100 WBCS — SIGNIFICANT CHANGE UP (ref 0–0)
ORGANISM # SPEC MICROSCOPIC CNT: SIGNIFICANT CHANGE UP
PLATELET # BLD AUTO: 331 K/UL — SIGNIFICANT CHANGE UP (ref 130–400)
POTASSIUM SERPL-MCNC: 5 MMOL/L — SIGNIFICANT CHANGE UP (ref 3.5–5)
POTASSIUM SERPL-SCNC: 5 MMOL/L — SIGNIFICANT CHANGE UP (ref 3.5–5)
PROT SERPL-MCNC: 7.9 G/DL — SIGNIFICANT CHANGE UP (ref 6–8)
RBC # BLD: 5.09 M/UL — SIGNIFICANT CHANGE UP (ref 4.2–5.4)
RBC # FLD: 14.5 % — SIGNIFICANT CHANGE UP (ref 11.5–14.5)
SODIUM SERPL-SCNC: 136 MMOL/L — SIGNIFICANT CHANGE UP (ref 135–146)
SPECIMEN SOURCE: SIGNIFICANT CHANGE UP
WBC # BLD: 6.07 K/UL — SIGNIFICANT CHANGE UP (ref 4.8–10.8)
WBC # FLD AUTO: 6.07 K/UL — SIGNIFICANT CHANGE UP (ref 4.8–10.8)

## 2023-04-04 PROCEDURE — 99233 SBSQ HOSP IP/OBS HIGH 50: CPT

## 2023-04-04 RX ORDER — DICLOFENAC SODIUM 75 MG/1
50 TABLET, DELAYED RELEASE ORAL EVERY 8 HOURS
Refills: 0 | Status: DISCONTINUED | OUTPATIENT
Start: 2023-04-04 | End: 2023-04-05

## 2023-04-04 RX ORDER — GABAPENTIN 400 MG/1
300 CAPSULE ORAL EVERY 8 HOURS
Refills: 0 | Status: DISCONTINUED | OUTPATIENT
Start: 2023-04-04 | End: 2023-04-05

## 2023-04-04 RX ORDER — MORPHINE SULFATE 50 MG/1
10 CAPSULE, EXTENDED RELEASE ORAL EVERY 4 HOURS
Refills: 0 | Status: DISCONTINUED | OUTPATIENT
Start: 2023-04-04 | End: 2023-04-05

## 2023-04-04 RX ORDER — MORPHINE SULFATE 50 MG/1
10 CAPSULE, EXTENDED RELEASE ORAL EVERY 4 HOURS
Refills: 0 | Status: DISCONTINUED | OUTPATIENT
Start: 2023-04-04 | End: 2023-04-04

## 2023-04-04 RX ADMIN — MORPHINE SULFATE 4 MILLIGRAM(S): 50 CAPSULE, EXTENDED RELEASE ORAL at 08:55

## 2023-04-04 RX ADMIN — BACITRACIN ZINC NEOMYCIN SULFATE POLYMYXIN B SULFATE 1 APPLICATION(S): 400; 3.5; 5 OINTMENT TOPICAL at 09:11

## 2023-04-04 RX ADMIN — CEFEPIME 100 MILLIGRAM(S): 1 INJECTION, POWDER, FOR SOLUTION INTRAMUSCULAR; INTRAVENOUS at 05:58

## 2023-04-04 RX ADMIN — Medication 975 MILLIGRAM(S): at 22:47

## 2023-04-04 RX ADMIN — Medication 2 MILLIGRAM(S): at 12:45

## 2023-04-04 RX ADMIN — BUDESONIDE AND FORMOTEROL FUMARATE DIHYDRATE 2 PUFF(S): 160; 4.5 AEROSOL RESPIRATORY (INHALATION) at 08:38

## 2023-04-04 RX ADMIN — METHADONE HYDROCHLORIDE 190 MILLIGRAM(S): 40 TABLET ORAL at 12:02

## 2023-04-04 RX ADMIN — Medication 975 MILLIGRAM(S): at 05:59

## 2023-04-04 RX ADMIN — GABAPENTIN 100 MILLIGRAM(S): 400 CAPSULE ORAL at 13:27

## 2023-04-04 RX ADMIN — Medication 975 MILLIGRAM(S): at 13:26

## 2023-04-04 RX ADMIN — MORPHINE SULFATE 10 MILLIGRAM(S): 50 CAPSULE, EXTENDED RELEASE ORAL at 18:47

## 2023-04-04 RX ADMIN — DICLOFENAC SODIUM 50 MILLIGRAM(S): 75 TABLET, DELAYED RELEASE ORAL at 21:21

## 2023-04-04 RX ADMIN — ENOXAPARIN SODIUM 40 MILLIGRAM(S): 100 INJECTION SUBCUTANEOUS at 13:27

## 2023-04-04 RX ADMIN — Medication 975 MILLIGRAM(S): at 21:20

## 2023-04-04 RX ADMIN — MORPHINE SULFATE 4 MILLIGRAM(S): 50 CAPSULE, EXTENDED RELEASE ORAL at 09:29

## 2023-04-04 RX ADMIN — Medication 2 MILLIGRAM(S): at 21:20

## 2023-04-04 RX ADMIN — Medication 2 MILLIGRAM(S): at 05:58

## 2023-04-04 RX ADMIN — CEFEPIME 100 MILLIGRAM(S): 1 INJECTION, POWDER, FOR SOLUTION INTRAMUSCULAR; INTRAVENOUS at 13:24

## 2023-04-04 RX ADMIN — METHOCARBAMOL 750 MILLIGRAM(S): 500 TABLET, FILM COATED ORAL at 13:26

## 2023-04-04 RX ADMIN — Medication 975 MILLIGRAM(S): at 14:02

## 2023-04-04 RX ADMIN — DICLOFENAC SODIUM 50 MILLIGRAM(S): 75 TABLET, DELAYED RELEASE ORAL at 14:02

## 2023-04-04 RX ADMIN — GABAPENTIN 100 MILLIGRAM(S): 400 CAPSULE ORAL at 05:58

## 2023-04-04 RX ADMIN — METHOCARBAMOL 750 MILLIGRAM(S): 500 TABLET, FILM COATED ORAL at 21:20

## 2023-04-04 RX ADMIN — GABAPENTIN 300 MILLIGRAM(S): 400 CAPSULE ORAL at 21:21

## 2023-04-04 RX ADMIN — BUDESONIDE AND FORMOTEROL FUMARATE DIHYDRATE 2 PUFF(S): 160; 4.5 AEROSOL RESPIRATORY (INHALATION) at 22:48

## 2023-04-04 RX ADMIN — DICLOFENAC SODIUM 50 MILLIGRAM(S): 75 TABLET, DELAYED RELEASE ORAL at 22:47

## 2023-04-04 RX ADMIN — METHOCARBAMOL 750 MILLIGRAM(S): 500 TABLET, FILM COATED ORAL at 05:58

## 2023-04-04 RX ADMIN — DICLOFENAC SODIUM 50 MILLIGRAM(S): 75 TABLET, DELAYED RELEASE ORAL at 13:26

## 2023-04-04 RX ADMIN — MORPHINE SULFATE 10 MILLIGRAM(S): 50 CAPSULE, EXTENDED RELEASE ORAL at 19:10

## 2023-04-04 RX ADMIN — Medication 975 MILLIGRAM(S): at 06:06

## 2023-04-04 RX ADMIN — CEFEPIME 100 MILLIGRAM(S): 1 INJECTION, POWDER, FOR SOLUTION INTRAMUSCULAR; INTRAVENOUS at 21:21

## 2023-04-04 RX ADMIN — PANTOPRAZOLE SODIUM 40 MILLIGRAM(S): 20 TABLET, DELAYED RELEASE ORAL at 05:58

## 2023-04-04 NOTE — PROGRESS NOTE ADULT - ASSESSMENT
42F w/ PMHx of anxiety, chronic venous stasis, HO opiate use disorder/IVDU (on methadone 190mg; last used 13y ago) p/w LLE pain, swelling, and ulcers.     # LLE ulcers w/ cellulitis  CT B/L LE demonstrated possible cutaneous abscess near focal skin ulceration at the posterior medial aspect of the left knee, but no evidence of necrotizing infection. Venous duplex LLE negative for DVT or superificial thrombophlebitis.  - Wound culture w/ Pseudomonas + GBS  - C/w cefepime 2000mg IV QD (per ID)  - Wound care: Silvadene/Xeroform/ACE Q24H  - Burn eval: No abscess, no acute surgical intervention  - Pain control:       - Robaxin 750mg PO Q8H       - Gabapentin 100mg PO Q8H       - Tylenol 975mg PO Q8H       - Diclofenac 50mg PO Q8H       - Morphine 10mg PO Q4H PRN severe pain    # Opiate use disorder  # HO IVDU  Dose verified by ED provider (methadone clinic number is [phone]). Of note, per ED provider documentation, pt last received methadone 3/23 and would have run out of her doses by 3/28.  - C/w methadone 190mg PO QD    # Anxiety  Managed via alprazolam 2 tid. Dose verified via I-STOP (Reference #: [id number])  - C/w alprazolam 2mg PO TID    # To follow up  - Monitor response to abx  - F/u wound Cx sensitivities for PO abx    # Misc  - DVT Prophylaxis: enoxaparin Injectable  - GI Prophylaxis: pantoprazole    Tablet 40 milliGRAM(s) Oral before breakfast  - Diet: Diet, DASH/TLC  - Activity: Activity - Increase As Tolerated  - Code Status: Full
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
## 2023-04-05 ENCOUNTER — TRANSCRIPTION ENCOUNTER (OUTPATIENT)
Age: 43
End: 2023-04-05

## 2023-04-05 VITALS
SYSTOLIC BLOOD PRESSURE: 93 MMHG | TEMPERATURE: 96 F | RESPIRATION RATE: 18 BRPM | DIASTOLIC BLOOD PRESSURE: 55 MMHG | HEART RATE: 73 BPM

## 2023-04-05 PROCEDURE — 99239 HOSP IP/OBS DSCHRG MGMT >30: CPT

## 2023-04-05 RX ORDER — ACETAMINOPHEN 500 MG
3 TABLET ORAL
Qty: 270 | Refills: 0
Start: 2023-04-05 | End: 2023-05-04

## 2023-04-05 RX ORDER — BACITRACIN ZINC NEOMYCIN SULFATE POLYMYXIN B SULFATE 400; 3.5; 5 [IU]/G; MG/G; [IU]/G
1 OINTMENT TOPICAL
Qty: 1 | Refills: 0
Start: 2023-04-05

## 2023-04-05 RX ORDER — CIPROFLOXACIN LACTATE 400MG/40ML
1 VIAL (ML) INTRAVENOUS
Qty: 24 | Refills: 0
Start: 2023-04-05 | End: 2023-04-16

## 2023-04-05 RX ORDER — METHOCARBAMOL 500 MG/1
1 TABLET, FILM COATED ORAL
Qty: 90 | Refills: 0
Start: 2023-04-05 | End: 2023-05-04

## 2023-04-05 RX ORDER — GABAPENTIN 400 MG/1
1 CAPSULE ORAL
Qty: 90 | Refills: 0
Start: 2023-04-05 | End: 2023-05-04

## 2023-04-05 RX ORDER — DICLOFENAC SODIUM 75 MG/1
1 TABLET, DELAYED RELEASE ORAL
Qty: 90 | Refills: 0
Start: 2023-04-05 | End: 2023-05-04

## 2023-04-05 RX ADMIN — CEFEPIME 100 MILLIGRAM(S): 1 INJECTION, POWDER, FOR SOLUTION INTRAMUSCULAR; INTRAVENOUS at 13:52

## 2023-04-05 RX ADMIN — Medication 975 MILLIGRAM(S): at 13:15

## 2023-04-05 RX ADMIN — Medication 2 MILLIGRAM(S): at 05:23

## 2023-04-05 RX ADMIN — DICLOFENAC SODIUM 50 MILLIGRAM(S): 75 TABLET, DELAYED RELEASE ORAL at 13:16

## 2023-04-05 RX ADMIN — CEFEPIME 100 MILLIGRAM(S): 1 INJECTION, POWDER, FOR SOLUTION INTRAMUSCULAR; INTRAVENOUS at 05:22

## 2023-04-05 RX ADMIN — DICLOFENAC SODIUM 50 MILLIGRAM(S): 75 TABLET, DELAYED RELEASE ORAL at 05:23

## 2023-04-05 RX ADMIN — Medication 3 MILLIGRAM(S): at 00:02

## 2023-04-05 RX ADMIN — PANTOPRAZOLE SODIUM 40 MILLIGRAM(S): 20 TABLET, DELAYED RELEASE ORAL at 05:22

## 2023-04-05 RX ADMIN — METHOCARBAMOL 750 MILLIGRAM(S): 500 TABLET, FILM COATED ORAL at 05:22

## 2023-04-05 RX ADMIN — GABAPENTIN 300 MILLIGRAM(S): 400 CAPSULE ORAL at 13:15

## 2023-04-05 RX ADMIN — GABAPENTIN 300 MILLIGRAM(S): 400 CAPSULE ORAL at 05:23

## 2023-04-05 RX ADMIN — BACITRACIN ZINC NEOMYCIN SULFATE POLYMYXIN B SULFATE 1 APPLICATION(S): 400; 3.5; 5 OINTMENT TOPICAL at 11:22

## 2023-04-05 RX ADMIN — Medication 975 MILLIGRAM(S): at 05:23

## 2023-04-05 RX ADMIN — METHADONE HYDROCHLORIDE 190 MILLIGRAM(S): 40 TABLET ORAL at 12:20

## 2023-04-05 RX ADMIN — METHOCARBAMOL 750 MILLIGRAM(S): 500 TABLET, FILM COATED ORAL at 13:15

## 2023-04-05 RX ADMIN — ONDANSETRON 4 MILLIGRAM(S): 8 TABLET, FILM COATED ORAL at 11:24

## 2023-04-05 RX ADMIN — Medication 2 MILLIGRAM(S): at 13:15

## 2023-04-05 RX ADMIN — DICLOFENAC SODIUM 50 MILLIGRAM(S): 75 TABLET, DELAYED RELEASE ORAL at 06:15

## 2023-04-05 RX ADMIN — Medication 975 MILLIGRAM(S): at 06:15

## 2023-04-05 NOTE — DISCHARGE NOTE NURSING/CASE MANAGEMENT/SOCIAL WORK - PATIENT PORTAL LINK FT
You can access the FollowMyHealth Patient Portal offered by [facility] by registering at the following website: [email or web address]. By joining [employer]’s FollowMyHealth portal, you will also be able to view your health information using other applications (apps) compatible with our system.
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
## 2023-04-05 NOTE — DISCHARGE NOTE PROVIDER - NSDCHHASSISTILLNESS_GEN_ALL_CORE
using darco shoe on left foot because of dressing and pain w/ ambulation - needs sometimes to hold on to someone w/ walking long distances
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
## 2023-04-05 NOTE — DISCHARGE NOTE PROVIDER - NSDCMRMEDTOKEN_GEN_ALL_CORE_FT
Albuterol (Eqv-Ventolin HFA) 90 mcg/inh inhalation aerosol: 2 puff(s) inhaled every 6 hours as needed for  shortness of breath and/or wheezing  ALPRAZolam 2 mg oral tablet: 1 tab(s) orally every 8 hours as needed for  anxiety  methadone 10 mg oral tablet: 190 milligram(s) orally once a day  Symbicort 160 mcg-4.5 mcg/inh inhalation aerosol: 2 puff(s) inhaled 2 times a day   acetaminophen 325 mg oral tablet: 3 tab(s) orally every 8 hours  Albuterol (Eqv-Ventolin HFA) 90 mcg/inh inhalation aerosol: 2 puff(s) inhaled every 6 hours as needed for  shortness of breath and/or wheezing  ALPRAZolam 2 mg oral tablet: 1 tab(s) orally every 8 hours as needed for  anxiety  amoxicillin-clavulanate 875 mg-125 mg oral tablet: 1 tab(s) orally 2 times a day  bacitracin/neomycin/polymyxin B 400 units-3.5 mg-5000 units/g topical ointment: Apply topically to affected area once a day 1 Apply topically to affected area once a day  ciprofloxacin 750 mg oral tablet: 1 tab(s) orally 2 times a day  diclofenac sodium 50 mg oral delayed release tablet: 1 tab(s) orally every 8 hours  gabapentin 300 mg oral capsule: 1 cap(s) orally every 8 hours  methadone 10 mg oral tablet: 190 milligram(s) orally once a day  methocarbamol 750 mg oral tablet: 1 tab(s) orally every 8 hours  Symbicort 160 mcg-4.5 mcg/inh inhalation aerosol: 2 puff(s) inhaled 2 times a day

## 2023-04-05 NOTE — DISCHARGE NOTE PROVIDER - CARE PROVIDER_API CALL
ADINA TODD  [address]  [place] [address]  Phone: ([phone]  Fax: ()-  Established Patient  Follow Up Time: 2 weeks

## 2023-04-05 NOTE — PROGRESS NOTE ADULT - SUBJECTIVE AND OBJECTIVE BOX
CANDE DIETZ 42y Female  MRN#: [id number]   Hospital Day: 1d    SUBJECTIVE  Patient is a 42y old Female who presents with a chief complaint of Infected Left Lower Leg Ulcers (31 Mar 2023 23:31)  Currently admitted to medicine with the primary diagnosis of Left leg cellulitis      INTERVAL HPI AND OVERNIGHT EVENTS:  Patient was examined and seen at bedside. This morning she is resting comfortably in bed and reports no issues or overnight events.    REVIEW OF SYMPTOMS:  CONSTITUTIONAL: No weakness, fevers or chills; No headaches  EYES: No visual changes, eye pain, or discharge  ENT: No vertigo; No ear pain or change in hearing; No sore throat or difficulty swallowing  NECK: No pain or stiffness  RESPIRATORY: No cough, wheezing, or hemoptysis; No shortness of breath  CARDIOVASCULAR: No chest pain or palpitations  GASTROINTESTINAL: No abdominal or epigastric pain; No nausea, vomiting, or hematemesis; No diarrhea or constipation; No melena or hematochezia  GENITOURINARY: No dysuria, frequency or hematuria  MUSCULOSKELETAL: No joint pain, no muscle pain, no weakness  NEUROLOGICAL: No numbness or weakness  SKIN: No itching or rashes    OBJECTIVE  PAST MEDICAL & SURGICAL HISTORY  H/O discitis    Opiate dependence    H/O intravenous drug use in remission    No significant past surgical history      ALLERGIES:  Bactrim (Short breath)    MEDICATIONS:  STANDING MEDICATIONS  acetaminophen     Tablet .. 975 milliGRAM(s) Oral every 8 hours  ALPRAZolam 2 milliGRAM(s) Oral three times a day  budesonide 160 MICROgram(s)/formoterol 4.5 MICROgram(s) Inhaler 2 Puff(s) Inhalation two times a day  enoxaparin Injectable 40 milliGRAM(s) SubCutaneous every 24 hours  methadone    Tablet 190 milliGRAM(s) Oral daily  vancomycin  IVPB 1000 milliGRAM(s) IV Intermittent every 12 hours    PRN MEDICATIONS  albuterol    90 MICROgram(s) HFA Inhaler 2 Puff(s) Inhalation every 6 hours PRN  diclofenac 50 milliGRAM(s) Oral every 8 hours PRN  melatonin 3 milliGRAM(s) Oral at bedtime PRN      VITAL SIGNS: Last 24 Hours  T(C): 36.3 (2023 04:03), Max: 37.1 (31 Mar 2023 19:00)  T(F): 97.3 (2023 04:03), Max: 98.8 (31 Mar 2023 19:42)  HR: 84 (2023 04:03) (82 - 84)  BP: 128/73 (2023 04:03) (124/80 - 128/73)  BP(mean): --  RR: 18 (2023 04:03) (18 - 20)  SpO2: 99% (31 Mar 2023 11:56) (99% - 99%)    LABS:                        12.2   6.32  )-----------( 329      ( 2023 09:02 )             39.2     04-01    139  |  99  |  11  ----------------------------<  90  4.7   |  26  |  0.8    Ca    9.2      2023 09:02  Phos  4.8     04-01  Mg     2.0     04-01    TPro  7.8  /  Alb  3.6  /  TBili  <0.2  /  DBili  x   /  AST  28  /  ALT  25  /  AlkPhos  106  04-01    PT/INR - ( 31 Mar 2023 14:17 )   PT: 11.00 sec;   INR: 0.97 ratio         PTT - ( 31 Mar 2023 14:17 )  PTT:34.1 sec  Urinalysis Basic - ( 31 Mar 2023 14:38 )    Color: Light Yellow / Appearance: Clear / S.012 / pH: x  Gluc: x / Ketone: Negative  / Bili: Negative / Urobili: <2 mg/dL   Blood: x / Protein: Negative / Nitrite: Negative   Leuk Esterase: Negative / RBC: x / WBC x   Sq Epi: x / Non Sq Epi: x / Bacteria: x      PHYSICAL EXAM:  CONSTITUTIONAL: No acute distress, well-developed, well-groomed, AAOx3  HEAD: Atraumatic, normocephalic  EYES: EOM intact, PERRLA, conjunctiva and sclera clear  ENT: Supple, no masses, no thyromegaly, no bruits, no JVD; moist mucous membranes  PULMONARY: Clear to auscultation bilaterally  CARDIOVASCULAR: Regular rate and rhythm  GASTROINTESTINAL: Soft, non-tender, non-distended  MUSCULOSKELETAL: 2+ peripheral pulses; No edema   NEUROLOGY: non-focal  SKIN: Lesions throughout the upper and lower extremities     
HPI:  42F w/ h/o opiate use disorder (on methadone), IVDU (reports last use 15yrs ago), anxiety, and chronic venous stasis p/w LLE pain, swelling, and ulcers. Patient first noted pain and swelling 2 months ago. Ulcers gradually developed over these two months. Seen by PCP, who prescribed Silvadene ointment w/ no improvement in ulcers. No reported fevers, chills, CP, palpitations, SOB, abdominal pain, n/v/d, or dysuria.    In ED, pt HD stable on vitals. Labs unremarkable. Venous duplex of LLE negative for DVT (pre-forbes read). CT B/L LE demonstrates possible cutaneous abscess near focal skin ulceration at the posterior medial aspect of the left knee, but no evidence of necrotizing infection. S/P vancomycin 1g IV and morphine 4mg IV. Admitted to medicine for LLE infected ulcers. (31 Mar 2023 23:31)    Currently admitted to medicine with the primary diagnosis of Left leg cellulitis       Today is hospital day 4d.     INTERVAL HPI / OVERNIGHT EVENTS:  Patient was examined and seen at bedside. This morning she is resting comfortably in bed and reports no new issues or overnight events.     ROS: Otherwise unremarkable     PAST MEDICAL & SURGICAL HISTORY  H/O discitis    Opiate dependence    H/O intravenous drug use in remission    No significant past surgical history      ALLERGIES  Bactrim (Short breath)    MEDICATIONS  STANDING MEDICATIONS  acetaminophen     Tablet .. 975 milliGRAM(s) Oral every 8 hours  ALPRAZolam 2 milliGRAM(s) Oral three times a day  budesonide 160 MICROgram(s)/formoterol 4.5 MICROgram(s) Inhaler 2 Puff(s) Inhalation two times a day  cefepime   IVPB 2000 milliGRAM(s) IV Intermittent every 8 hours  cefepime   IVPB      diclofenac 50 milliGRAM(s) Oral every 8 hours  enoxaparin Injectable 40 milliGRAM(s) SubCutaneous every 24 hours  gabapentin 100 milliGRAM(s) Oral every 8 hours  methadone    Tablet 190 milliGRAM(s) Oral daily  methocarbamol 750 milliGRAM(s) Oral every 8 hours  neomycin/bacitracin/polymyxin Topical Ointment 1 Application(s) Topical daily  pantoprazole    Tablet 40 milliGRAM(s) Oral before breakfast    PRN MEDICATIONS  albuterol    90 MICROgram(s) HFA Inhaler 2 Puff(s) Inhalation every 6 hours PRN  hydrOXYzine hydrochloride 25 milliGRAM(s) Oral every 6 hours PRN  loperamide 2 milliGRAM(s) Oral every 6 hours PRN  melatonin 3 milliGRAM(s) Oral at bedtime PRN  morphine Concentrate 10 milliGRAM(s) Oral every 4 hours PRN  ondansetron    Tablet 4 milliGRAM(s) Oral every 6 hours PRN    VITALS:  T(F): 96.6  HR: 71  BP: 114/63  RR: 18  SpO2: 97%    PHYSICAL EXAM  GENERAL: NAD, resting comfortably in bed  HEAD:  Atraumatic, Normocephalic  EYES: EOMI, conjunctiva and sclera clear  ENT: Moist mucous membranes  CHEST/LUNG: Clear to auscultation bilaterally; No rales, rhonchi, wheezing, or rubs. Unlabored respirations  HEART: Regular rate and rhythm; No murmurs, rubs, or gallops  ABDOMEN: +BS; Soft, nontender, nondistended  EXTREMITIES:  LLE bandaged; dressings clean/dry/intact  NERVOUS SYSTEM:  A&Ox3, follows commands, no sensory or motor deficits    LABS                        12.7   6.07  )-----------( 331      ( 04 Apr 2023 08:53 )             40.4     04-04    136  |  102  |  16  ----------------------------<  75  5.0   |  25  |  0.8    Ca    8.8      04 Apr 2023 08:53  Mg     2.1     04-04    TPro  7.9  /  Alb  3.6  /  TBili  0.4  /  DBili  x   /  AST  27  /  ALT  25  /  AlkPhos  97  04-04  
  CANDE DIETZ  42y  Female  ***My note supersedes ALL resident notes that I sign.  My corrections for their notes are in my note.***    I can be reached directly on [employer] 0823. My office number is [phone]. My personal cell number is [phone].    INTERVAL EVENTS: Here for f/u of left leg cellulitis. Pt still c/o of severe pain in lower left leg. Pt agrees to try opiate and non-opiate meds for relief of pain. Pt is aware of risk of relapse for opiate addiction if not watched carefully. I agree, legs look painful (objectively to me). Pt able to eat/drink. Dressing changes are painful. Pt can walk. Pt has not abused drugs for 13 years. Pt is mother of 2 now. Still on high-dose methadone. Pt able to eat/drink.    T(F): 97.5 (23 @ 12:14), Max: 97.7 (23 @ 05:33)  HR: 76 (23 @ 12:14) (70 - 78)  BP: 124/56 (23 @ 12:14) (99/52 - 127/63)  RR: 18 (23 @ 12:14) (18 - 18)  SpO2: 100% (23 @ 05:33) (100% - 100%)    Gen: NAD  skin: legs have chr healed ulcers and chr stasis changes; feet are NOT clean b/l (poor hygiene);   lt le ulcers (medial post calf and medial ant distal shin); calf is larger and deeper; both are painful; + redness around both; + warmth; no drainage or necrosis  HEENT: PERRL, EOMI, mouth clr, nose clr  Neck: no nodes, no JVD, thyroid nl  lungs: clr  hrt: s1 s2 rrr no murmur  abd: soft, NT/ND, no HS megaly  ext: tr edema, no c/c  neuro: aa ox3, cn intact, can move all 4 ext    LABS:    Culture - Other (collected 23 @ 11:33)  Source: Wound Wound  Preliminary Report (23 @ 20:26):    Numerous Pseudomonas aeruginosa    Moderate Streptococcus agalactiae (Group B) isolated    Group B streptococci are susceptible to ampicillin,    penicillin and cefazolin, but may be resistant to    erythromycin and clindamycin.    Recommendations for intrapartum prophylaxis for Group B    streptococci are penicillin or ampicillin.    Culture - Urine (collected 23 @ 14:38)  Source: Clean Catch Clean Catch (Midstream)  Final Report (23 @ 18:09):    <10,000 CFU/mL Normal Urogenital Glenna    Culture - Blood (collected 23 @ 14:24)  Source: .Blood Blood-Peripheral  Preliminary Report (23 @ 22:01):    No growth to date.    Culture - Blood (collected 23 @ 14:24)  Source: .Blood Blood-Peripheral  Preliminary Report (23 @ 22:01):    No growth to date.    RADIOLOGY & ADDITIONAL TESTS:  < from: [facility] Duplex Lower Extrem Arterial, Bilat (23 @ 18:53) >  Impression:    Normal arterial flow in bilateral lower extremities.  Calf arteries are not visualized due to patient status on the left side    < end of copied text >    < from: CT Lower Extremity w/ IV Cont, Bilateral (23 @ 16:31) >  IMPRESSION:  Possible 3 x 0.6 x 2 cm cutaneous abscess near focal skin ulceration at   the posterior medial aspect of the left knee. Adjacent superficial   thrombophlebitis is suspected. No evidence of necrotizing infection.    < end of copied text >    < from: [facility] Duplex Lower Ext Vein Scan, Left (23 @ 16:04) >  Impression:    No evidence of deep venous thrombosis or superficial thrombophlebitis in   the left lower extremity ICD-10:M79.89    < end of copied text >    MEDICATIONS:  cefepime   IVPB      cefepime   IVPB 2000 milliGRAM(s) IV Intermittent every 8 hours    acetaminophen     Tablet .. 975 milliGRAM(s) Oral every 8 hours  albuterol    90 MICROgram(s) HFA Inhaler 2 Puff(s) Inhalation every 6 hours PRN  ALPRAZolam 2 milliGRAM(s) Oral three times a day  budesonide 160 MICROgram(s)/formoterol 4.5 MICROgram(s) Inhaler 2 Puff(s) Inhalation two times a day  diclofenac 50 milliGRAM(s) Oral every 8 hours PRN  enoxaparin Injectable 40 milliGRAM(s) SubCutaneous every 24 hours  gabapentin 100 milliGRAM(s) Oral every 8 hours  hydrOXYzine hydrochloride 25 milliGRAM(s) Oral every 6 hours PRN  loperamide 2 milliGRAM(s) Oral every 6 hours PRN  melatonin 3 milliGRAM(s) Oral at bedtime PRN  methadone    Tablet 190 milliGRAM(s) Oral daily  methocarbamol 750 milliGRAM(s) Oral every 8 hours  morphine  - Injectable 4 milliGRAM(s) IV Push every 4 hours PRN  ondansetron    Tablet 4 milliGRAM(s) Oral every 6 hours PRN  pantoprazole    Tablet 40 milliGRAM(s) Oral before breakfast  
Progress Note:  Provider Speciality                            Hospitalist      CANDE DIETZ MRN-[id number] 42y Female     CHIEF PRESENTING COMPLAINT:  Patient is a 42y old  Female who presents with a chief complaint of Infected Left Lower Leg Ulcers (01 Apr 2023 13:40)        SUBJECTIVE:  Patient was seen and examined at bedside. Reports severe pain & burning LLE  No significant overnight events reported.     HISTORY OF PRESENTING ILLNESS:  HPI:  42F w/ h/o opiate use disorder (on methadone), IVDU (reports last use 15yrs ago), anxiety, and chronic venous stasis p/w LLE pain, swelling, and ulcers. Patient first noted pain and swelling 2 months ago. Ulcers gradually developed over these two months. Seen by PCP, who prescribed Silvadene ointment w/ no improvement in ulcers. No reported fevers, chills, CP, palpitations, SOB, abdominal pain, n/v/d, or dysuria.    In ED, pt HD stable on vitals. Labs unremarkable. Venous duplex of LLE negative for DVT (pre-forbes read). CT B/L LE demonstrates possible cutaneous abscess near focal skin ulceration at the posterior medial aspect of the left knee, but no evidence of necrotizing infection. S/P vancomycin 1g IV and morphine 4mg IV. Admitted to medicine for LLE infected ulcers. (31 Mar 2023 23:31)        REVIEW OF SYSTEMS:  Patient denies  headache, fever, chills. Denies chest pain, shortness of breath, palpitation. Denies nausea, vomiting, abdominal pain or diarrhoea, Denies dysuria.   At least 10 systems were reviewed in ROS. All systems reviewed  are within normal limits except for the complaints as described in Subjective.    PAST MEDICAL & SURGICAL HISTORY:  PAST MEDICAL & SURGICAL HISTORY:  H/O discitis      Opiate dependence      H/O intravenous drug use in remission      No significant past surgical history              VITAL SIGNS:  Vital Signs Last 24 Hrs  T(C): 36.3 (02 Apr 2023 12:35), Max: 37.1 (01 Apr 2023 19:22)  T(F): 97.4 (02 Apr 2023 12:35), Max: 98.7 (01 Apr 2023 19:22)  HR: 77 (02 Apr 2023 12:35) (77 - 87)  BP: 127/69 (02 Apr 2023 12:35) (116/71 - 127/69)  BP(mean): --  RR: 18 (02 Apr 2023 12:35) (18 - 18)  SpO2: 99% (02 Apr 2023 04:52) (99% - 99%)              PHYSICAL EXAMINATION:  Not in acute distress  General: No icterus  HEENT:   no JVD.  Heart: S1+S2 audible  Lungs: bilateral  moderate air entry, no wheezing, no crepitations.  Abdomen: Soft, non-tender, non-distended , no  rigidity or guarding.  CNS: Awake alert, CN  grossly intact.  Extremities: Edema and erythema of LLE. Multiple ulcers. Ulcer to medial posterior knee another at medial calf and distal anteromedial shin. ulcer to dorsum of foot. No purulent drainage or active bleeding.      CONSULTS:  Consultant(s) Notes Reviewed by me.   Care Discussed with Consultants/Other Providers where required.    All the images and labs were reviewed today        MEDICATIONS:  MEDICATIONS  (STANDING):  acetaminophen     Tablet .. 975 milliGRAM(s) Oral every 8 hours  ALPRAZolam 2 milliGRAM(s) Oral three times a day  budesonide 160 MICROgram(s)/formoterol 4.5 MICROgram(s) Inhaler 2 Puff(s) Inhalation two times a day  enoxaparin Injectable 40 milliGRAM(s) SubCutaneous every 24 hours  methadone    Tablet 190 milliGRAM(s) Oral daily  silver sulfADIAZINE 1% Cream 1 Application(s) Topical daily  vancomycin  IVPB 1500 milliGRAM(s) IV Intermittent every 12 hours    MEDICATIONS  (PRN):  albuterol    90 MICROgram(s) HFA Inhaler 2 Puff(s) Inhalation every 6 hours PRN for shortness of breath and/or wheezing  diclofenac 50 milliGRAM(s) Oral every 8 hours PRN Severe Pain (7 - 10)  hydrOXYzine hydrochloride 25 milliGRAM(s) Oral every 6 hours PRN Anxiety  loperamide 2 milliGRAM(s) Oral every 6 hours PRN Diarrhea  melatonin 3 milliGRAM(s) Oral at bedtime PRN Insomnia  ondansetron    Tablet 4 milliGRAM(s) Oral every 6 hours PRN Nausea and/or Vomiting            ASSESSMENT:    42F w/ h/o opiate use disorder (on methadone), IVDU (reports last use 15yrs ago), anxiety, and chronic venous stasis p/w LLE pain, swelling, and ulcers. Admitted to medicine for LLE infected ulcers.    LLE Infected Ulcers, possible phlebitis  Chronic venous stasis  Opiate Use Disorder  H/O IVDU in past  Anxiety disorder      CT Lower Extremity w/ IV Cont, 03/31. Possible 3 x 0.6 x 2 cm cutaneous abscess near focal skin ulceration at  the posterior medial aspect of the left knee. Adjacent superficial thrombophlebitis is suspected. No evidence of necrotizing infection.  ID noted- continue vancomycin - increase to 1500 mg q 12 hours .Check trough prior to 4th dose  Burn noted- no I&D needed   Keep LLE elvated.Local wound care with Silvadene and ace wrap  Opiate Use Disorder  IVDU-Reports h/o opiate/IVDU. Last IVDU over 15yrs ago per patient.  Continue methadone 190mg PO QD. Dose verified by ED provider (methadone clinic number is [phone])  Anxiety-on alprazolam 2 tid. Dose verified via [facility] (Reference #: [id number])  Handoff: Acute inpatient management  required further     
  CANDE DIETZ  42y  Female  ***My note supersedes ALL resident notes that I sign.  My corrections for their notes are in my note.***    I can be reached directly on [employer] 2429. My office number is [phone]. My personal cell number is [phone].    INTERVAL EVENTS: Here for f/u of cellulits. Says leg pain is improving, but still present. Pt seems comfortable in bed - more pain w/ dressing change.    T(F): 97.1 (04-04-23 @ 19:52), Max: 97.2 (04-03-23 @ 20:08)  HR: 73 (04-04-23 @ 19:52) (71 - 73)  BP: 113/69 (04-04-23 @ 19:52) (113/69 - 115/65)  RR: 18 (04-04-23 @ 19:52) (18 - 18)  SpO2: --    Gen: NAD  HEENT: PERRL, EOMI, mouth clr, nose clr  Neck: no nodes, no JVD, thyroid nl  lungs: clr  hrt: s1 s2 rrr no murmur  abd: soft, NT/ND, no HS megaly  ext: tr edema, no c/c  lt leg in dressing  neuro: aa ox3, cn intact, can move all 4 ext    LABS:                      12.7    (    79.4   6.07  )-----------( ---------      331      ( 04 Apr 2023 08:53 )             40.4    (    14.5     136   (   102   (   75      04-04-23 @ 08:53  ----------------------               5.0   (   25   (   16                             -----                        0.8  Ca  8.8   Mg  2.1    P   --     LFT  7.9  (  0.4  (  27       04-04-23 @ 08:53  -------------------------  3.6  (  97  (  25    Culture - Other (collected 04-01-23 @ 11:33)  Source: Wound Wound  Final Report (04-04-23 @ 17:42):    Numerous Pseudomonas aeruginosa    Moderate Streptococcus agalactiae (Group B) isolated    Group B streptococci are susceptible to ampicillin,    penicillin and cefazolin, but may be resistant to    erythromycin and clindamycin.    Recommendations for intrapartum prophylaxis for Group B    streptococci are penicillin or ampicillin.  Organism: Pseudomonas aeruginosa  Pseudomonas aeruginosa (04-04-23 @ 17:42)  Organism: Pseudomonas aeruginosa (04-04-23 @ 17:42)      Method Type: CarbaR      -  Resistance Gene to Carbapenem: Nondet  Organism: Pseudomonas aeruginosa (04-04-23 @ 17:42)      Method Type: LEONA      -  Amikacin: S <=16      -  Aztreonam: I 16      -  Cefepime: S 4      -  Ceftazidime: S 4      -  Ciprofloxacin: S 0.5      -  Gentamicin: S 4      -  Imipenem: I 4      -  Levofloxacin: I 2      -  Meropenem: S <=1      -  Piperacillin/Tazobactam: S <=8      -  Tobramycin: S <=2    Culture - Urine (collected 03-31-23 @ 14:38)  Source: Clean Catch Clean Catch (Midstream)  Final Report (04-01-23 @ 18:09):    <10,000 CFU/mL Normal Urogenital Glenna    Culture - Blood (collected 03-31-23 @ 14:24)  Source: .Blood Blood-Peripheral  Preliminary Report (04-01-23 @ 22:01):    No growth to date.    Culture - Blood (collected 03-31-23 @ 14:24)  Source: .Blood Blood-Peripheral  Preliminary Report (04-01-23 @ 22:01):    No growth to date.    RADIOLOGY & ADDITIONAL TESTS:    MEDICATIONS:  cefepime   IVPB 2000 milliGRAM(s) IV Intermittent every 8 hours  cefepime   IVPB        acetaminophen     Tablet .. 975 milliGRAM(s) Oral every 8 hours  albuterol    90 MICROgram(s) HFA Inhaler 2 Puff(s) Inhalation every 6 hours PRN  ALPRAZolam 2 milliGRAM(s) Oral three times a day  budesonide 160 MICROgram(s)/formoterol 4.5 MICROgram(s) Inhaler 2 Puff(s) Inhalation two times a day  diclofenac 50 milliGRAM(s) Oral every 8 hours  enoxaparin Injectable 40 milliGRAM(s) SubCutaneous every 24 hours  gabapentin 300 milliGRAM(s) Oral every 8 hours  hydrOXYzine hydrochloride 25 milliGRAM(s) Oral every 6 hours PRN  loperamide 2 milliGRAM(s) Oral every 6 hours PRN  melatonin 3 milliGRAM(s) Oral at bedtime PRN  methadone    Tablet 190 milliGRAM(s) Oral daily  methocarbamol 750 milliGRAM(s) Oral every 8 hours  morphine Concentrate 10 milliGRAM(s) Oral every 4 hours PRN  neomycin/bacitracin/polymyxin Topical Ointment 1 Application(s) Topical daily  ondansetron    Tablet 4 milliGRAM(s) Oral every 6 hours PRN  pantoprazole    Tablet 40 milliGRAM(s) Oral before breakfast  
CANDE DIETZ  42y, Female  Allergy: Bactrim (Short breath)      LOS  5d    CHIEF COMPLAINT: Infected Left Lower Leg Ulcers (05 Apr 2023 11:12)      INTERVAL EVENTS/HPI  - No acute events overnight  - T(F): , Max: 97.2 (04-05-23 @ 04:41)  - Denies any worsening symptoms  - Tolerating medication  - WBC Count: 6.07 (04-04-23 @ 08:53)  WBC Count: 6.65 (04-03-23 @ 18:29)     - Creatinine, Serum: 0.8 (04-04-23 @ 08:53)  Creatinine, Serum: 0.9 (04-03-23 @ 18:29)       ROS  General: Denies rigors, nightsweats  HEENT: Denies headache, rhinorrhea, sore throat, eye pain  CV: Denies CP, palpitations  PULM: Denies wheezing, hemoptysis  GI: Denies hematemesis, hematochezia, melena  : Denies discharge, hematuria  MSK: Denies arthralgias, myalgias  SKIN: Denies rash, lesions  NEURO: Denies paresthesias, weakness  PSYCH: Denies depression, anxiety    VITALS:  T(F): 96.2, Max: 97.2 (04-05-23 @ 04:41)  HR: 73  BP: 93/55  RR: 18Vital Signs Last 24 Hrs  T(C): 35.7 (05 Apr 2023 12:37), Max: 36.2 (04 Apr 2023 19:52)  T(F): 96.2 (05 Apr 2023 12:37), Max: 97.2 (05 Apr 2023 04:41)  HR: 73 (05 Apr 2023 12:37) (66 - 73)  BP: 93/55 (05 Apr 2023 12:37) (93/55 - 113/69)  BP(mean): --  RR: 18 (05 Apr 2023 12:37) (18 - 18)  SpO2: 96% (05 Apr 2023 04:41) (96% - 96%)        PHYSICAL EXAM:  Gen: NAD, resting in bed  HEENT: Normocephalic, atraumatic  Neck: supple, no lymphadenopathy  CV: Regular rate & regular rhythm  Lungs: decreased BS at bases, no fremitus  Abdomen: Soft, BS present  Ext: wounds dressed  Neuro: non focal, awake  Skin: no rash, no erythema  Lines: no phlebitis    FH: Non-contributory  Social Hx: Non-contributory    TESTS & MEASUREMENTS:                        12.7   6.07  )-----------( 331      ( 04 Apr 2023 08:53 )             40.4     04-04    136  |  102  |  16  ----------------------------<  75  5.0   |  25  |  0.8    Ca    8.8      04 Apr 2023 08:53  Mg     2.1     04-04    TPro  7.9  /  Alb  3.6  /  TBili  0.4  /  DBili  x   /  AST  27  /  ALT  25  /  AlkPhos  97  04-04      LIVER FUNCTIONS - ( 04 Apr 2023 08:53 )  Alb: 3.6 g/dL / Pro: 7.9 g/dL / ALK PHOS: 97 U/L / ALT: 25 U/L / AST: 27 U/L / GGT: x               Culture - Other (collected 04-01-23 @ 11:33)  Source: Wound Wound  Final Report (04-04-23 @ 17:42):    Numerous Pseudomonas aeruginosa    Moderate Streptococcus agalactiae (Group B) isolated    Group B streptococci are susceptible to ampicillin,    penicillin and cefazolin, but may be resistant to    erythromycin and clindamycin.    Recommendations for intrapartum prophylaxis for Group B    streptococci are penicillin or ampicillin.  Organism: Pseudomonas aeruginosa  Pseudomonas aeruginosa (04-04-23 @ 17:42)  Organism: Pseudomonas aeruginosa (04-04-23 @ 17:42)      Method Type: CarbaR      -  Resistance Gene to Carbapenem: Nondet  Organism: Pseudomonas aeruginosa (04-04-23 @ 17:42)      Method Type: LEONA      -  Amikacin: S <=16      -  Aztreonam: I 16      -  Cefepime: S 4      -  Ceftazidime: S 4      -  Ciprofloxacin: S 0.5      -  Gentamicin: S 4      -  Imipenem: I 4      -  Levofloxacin: I 2      -  Meropenem: S <=1      -  Piperacillin/Tazobactam: S <=8      -  Tobramycin: S <=2    Culture - Urine (collected 03-31-23 @ 14:38)  Source: Clean Catch Clean Catch (Midstream)  Final Report (04-01-23 @ 18:09):    <10,000 CFU/mL Normal Urogenital Glenna    Culture - Blood (collected 03-31-23 @ 14:24)  Source: .Blood Blood-Peripheral  Preliminary Report (04-01-23 @ 22:01):    No growth to date.    Culture - Blood (collected 03-31-23 @ 14:24)  Source: .Blood Blood-Peripheral  Preliminary Report (04-01-23 @ 22:01):    No growth to date.            INFECTIOUS DISEASES TESTING  MRSA PCR Result.: Negative (04-02-23 @ 10:17)  COVID-19 PCR: NotDetec (03-31-23 @ 14:19)  COVID-19 PCR: Detected (10-31-22 @ 13:38)  HIV-1/2 Combo Result: Nonreact (10-26-22 @ 05:13)  COVID-19 PCR: NotDetec (10-24-22 @ 21:02)  HIV-1/2 Combo Result: Nonreact (10-18-22 @ 09:01)  COVID-19 PCR: NotDetec (10-13-22 @ 23:15)      INFLAMMATORY MARKERS  C-Reactive Protein, Serum: 10.8 mg/L (04-01-23 @ 12:45)  C-Reactive Protein, Serum: 10.9 mg/L (04-01-23 @ 09:02)  Sedimentation Rate, Erythrocyte: 44 mm/Hr (04-01-23 @ 09:02)  Sedimentation Rate, Erythrocyte: 60 mm/Hr (10-26-22 @ 05:13)      RADIOLOGY & ADDITIONAL TESTS:  I have personally reviewed the last available Chest xray  CXR      CT      CARDIOLOGY TESTING  12 Lead ECG:   Ventricular Rate 84 BPM    Atrial Rate 84 BPM    P-R Interval 168 ms    QRS Duration 78 ms    Q-T Interval 394 ms    QTC Calculation(Bazett) 465 ms    P Axis 32 degrees    R Axis 39 degrees    T Axis 33 degrees    Diagnosis Line Normal sinus rhythm  Normal ECG    Confirmed by Gabriel Riley ([id number]) on 3/31/2023 7:39:59 PM (03-31-23 @ 13:31)      MEDICATIONS  acetaminophen     Tablet .. 975 Oral every 8 hours  ALPRAZolam 2 Oral three times a day  budesonide 160 MICROgram(s)/formoterol 4.5 MICROgram(s) Inhaler 2 Inhalation two times a day  cefepime   IVPB 2000 IV Intermittent every 8 hours  cefepime   IVPB     diclofenac 50 Oral every 8 hours  enoxaparin Injectable 40 SubCutaneous every 24 hours  gabapentin 300 Oral every 8 hours  methadone    Tablet 190 Oral daily  methocarbamol 750 Oral every 8 hours  neomycin/bacitracin/polymyxin Topical Ointment 1 Topical daily  pantoprazole    Tablet 40 Oral before breakfast      WEIGHT  Weight (kg): 109.4 (03-31-23 @ 19:42)      ANTIBIOTICS:  cefepime   IVPB 2000 milliGRAM(s) IV Intermittent every 8 hours  cefepime   IVPB          All available historical records have been reviewed      
HPI:  42F w/ h/o opiate use disorder (on methadone), IVDU (reports last use 15yrs ago), anxiety, and chronic venous stasis p/w LLE pain, swelling, and ulcers. Patient first noted pain and swelling 2 months ago. Ulcers gradually developed over these two months. Seen by PCP, who prescribed Silvadene ointment w/ no improvement in ulcers. No reported fevers, chills, CP, palpitations, SOB, abdominal pain, n/v/d, or dysuria.    In ED, pt HD stable on vitals. Labs unremarkable. Venous duplex of LLE negative for DVT (pre-forbes read). CT B/L LE demonstrates possible cutaneous abscess near focal skin ulceration at the posterior medial aspect of the left knee, but no evidence of necrotizing infection. S/P vancomycin 1g IV and morphine 4mg IV. Admitted to medicine for LLE infected ulcers. (31 Mar 2023 23:31)    Currently admitted to medicine with the primary diagnosis of Left leg cellulitis       Today is hospital day 3d.     INTERVAL HPI / OVERNIGHT EVENTS:  Patient was examined and seen at bedside. This morning she is resting comfortably in bed and reports no new issues or overnight events.     ROS: Otherwise unremarkable     PAST MEDICAL & SURGICAL HISTORY  H/O discitis    Opiate dependence    H/O intravenous drug use in remission    No significant past surgical history      ALLERGIES  Bactrim (Short breath)    MEDICATIONS  STANDING MEDICATIONS  acetaminophen     Tablet .. 975 milliGRAM(s) Oral every 8 hours  ALPRAZolam 2 milliGRAM(s) Oral three times a day  budesonide 160 MICROgram(s)/formoterol 4.5 MICROgram(s) Inhaler 2 Puff(s) Inhalation two times a day  cefepime   IVPB 2000 milliGRAM(s) IV Intermittent every 8 hours  cefepime   IVPB      enoxaparin Injectable 40 milliGRAM(s) SubCutaneous every 24 hours  gabapentin 100 milliGRAM(s) Oral every 8 hours  methadone    Tablet 190 milliGRAM(s) Oral daily  methocarbamol 750 milliGRAM(s) Oral every 8 hours  pantoprazole    Tablet 40 milliGRAM(s) Oral before breakfast    PRN MEDICATIONS  albuterol    90 MICROgram(s) HFA Inhaler 2 Puff(s) Inhalation every 6 hours PRN  diclofenac 50 milliGRAM(s) Oral every 8 hours PRN  hydrOXYzine hydrochloride 25 milliGRAM(s) Oral every 6 hours PRN  loperamide 2 milliGRAM(s) Oral every 6 hours PRN  melatonin 3 milliGRAM(s) Oral at bedtime PRN  morphine  - Injectable 4 milliGRAM(s) IV Push every 4 hours PRN  ondansetron    Tablet 4 milliGRAM(s) Oral every 6 hours PRN    VITALS:  T(F): 97.5  HR: 76  BP: 124/56  RR: 18  SpO2: 100%    PHYSICAL EXAM  GENERAL: NAD, resting comfortably in bed  HEAD:  Atraumatic, Normocephalic  EYES: EOMI, conjunctiva and sclera clear  ENT: Moist mucous membranes  CHEST/LUNG: Clear to auscultation bilaterally; No rales, rhonchi, wheezing, or rubs. Unlabored respirations  HEART: Regular rate and rhythm; No murmurs, rubs, or gallops  ABDOMEN: Soft, nontender, nondistended  EXTREMITIES:  + 2 ulcerative lesions on LLE w/ surrounding erythema/edema  NERVOUS SYSTEM:  A&Ox3, follows commands, no sensory or motor deficits    LABS                    Culture - Other (collected 01 Apr 2023 11:33)  Source: Wound Wound  Preliminary Report (02 Apr 2023 20:26):    Numerous Pseudomonas aeruginosa    Moderate Streptococcus agalactiae (Group B) isolated    Group B streptococci are susceptible to ampicillin,    penicillin and cefazolin, but may be resistant to    erythromycin and clindamycin.    Recommendations for intrapartum prophylaxis for Group B    streptococci are penicillin or ampicillin.    
  CANDE DIETZ  42y  Female  ***My note supersedes ALL resident notes that I sign.  My corrections for their notes are in my note.***    I can be reached directly on [employer]. My office number is [phone]. My personal cell number is [phone].    INTERVAL EVENTS: Here for f/u of cellulitis. Pt doing OK. Able to walk w/ slight limp - occ holds on to wall, gibson if wearing darco shoe. Pt asking for HC for wound care asst. Pain is improving. Pt feels OK to go home.    T(F): 96.2 (04-05-23 @ 12:37), Max: 97.2 (04-05-23 @ 04:41)  HR: 73 (04-05-23 @ 12:37) (66 - 73)  BP: 93/55 (04-05-23 @ 12:37) (93/55 - 113/69)  RR: 18 (04-05-23 @ 12:37) (18 - 18)  SpO2: 96% (04-05-23 @ 04:41) (96% - 96%)    Gen: NAD  HEENT: PERRL, EOMI, mouth clr, nose clr  Neck: no nodes, no JVD, thyroid nl  lungs: clr  hrt: s1 s2 rrr no murmur  abd: soft, NT/ND, no HS megaly  ext: tr edema, no c/c  lt leg in dressing  neuro: aa ox3, cn intact, can move all 4 ext    LABS:                      12.7    (    79.4   6.07  )-----------( ---------      331      ( 04 Apr 2023 08:53 )             40.4    (    14.5     Culture - Other (collected 04-01-23 @ 11:33)  Source: Wound Wound  Final Report (04-04-23 @ 17:42):    Numerous Pseudomonas aeruginosa    Moderate Streptococcus agalactiae (Group B) isolated    Group B streptococci are susceptible to ampicillin,    penicillin and cefazolin, but may be resistant to    erythromycin and clindamycin.    Recommendations for intrapartum prophylaxis for Group B    streptococci are penicillin or ampicillin.  Organism: Pseudomonas aeruginosa  Pseudomonas aeruginosa (04-04-23 @ 17:42)  Organism: Pseudomonas aeruginosa (04-04-23 @ 17:42)      Method Type: CarbaR      -  Resistance Gene to Carbapenem: Nondet  Organism: Pseudomonas aeruginosa (04-04-23 @ 17:42)      Method Type: LEONA      -  Amikacin: S <=16      -  Aztreonam: I 16      -  Cefepime: S 4      -  Ceftazidime: S 4      -  Ciprofloxacin: S 0.5      -  Gentamicin: S 4      -  Imipenem: I 4      -  Levofloxacin: I 2      -  Meropenem: S <=1      -  Piperacillin/Tazobactam: S <=8      -  Tobramycin: S <=2    Culture - Urine (collected 03-31-23 @ 14:38)  Source: Clean Catch Clean Catch (Midstream)  Final Report (04-01-23 @ 18:09):    <10,000 CFU/mL Normal Urogenital Glenna    Culture - Blood (collected 03-31-23 @ 14:24)  Source: .Blood Blood-Peripheral  Preliminary Report (04-01-23 @ 22:01):    No growth to date.    Culture - Blood (collected 03-31-23 @ 14:24)  Source: .Blood Blood-Peripheral  Preliminary Report (04-01-23 @ 22:01):    No growth to date.    RADIOLOGY & ADDITIONAL TESTS:    MEDICATIONS:  cefepime   IVPB 2000 milliGRAM(s) IV Intermittent every 8 hours  cefepime   IVPB        acetaminophen     Tablet .. 975 milliGRAM(s) Oral every 8 hours  albuterol    90 MICROgram(s) HFA Inhaler 2 Puff(s) Inhalation every 6 hours PRN  ALPRAZolam 2 milliGRAM(s) Oral three times a day  budesonide 160 MICROgram(s)/formoterol 4.5 MICROgram(s) Inhaler 2 Puff(s) Inhalation two times a day  diclofenac 50 milliGRAM(s) Oral every 8 hours  enoxaparin Injectable 40 milliGRAM(s) SubCutaneous every 24 hours  gabapentin 300 milliGRAM(s) Oral every 8 hours  hydrOXYzine hydrochloride 25 milliGRAM(s) Oral every 6 hours PRN  loperamide 2 milliGRAM(s) Oral every 6 hours PRN  melatonin 3 milliGRAM(s) Oral at bedtime PRN  methadone    Tablet 190 milliGRAM(s) Oral daily  methocarbamol 750 milliGRAM(s) Oral every 8 hours  morphine Concentrate 10 milliGRAM(s) Oral every 4 hours PRN  neomycin/bacitracin/polymyxin Topical Ointment 1 Application(s) Topical daily  ondansetron    Tablet 4 milliGRAM(s) Oral every 6 hours PRN  pantoprazole    Tablet 40 milliGRAM(s) Oral before breakfast  
CANDE DIETZ  42y, Female  Allergy: Bactrim (Short breath)      LOS  3d    CHIEF COMPLAINT: Infected Left Lower Leg Ulcers (03 Apr 2023 15:27)      INTERVAL EVENTS/HPI  - No acute events overnight  - T(F): , Max: 97.7 (04-03-23 @ 05:33)  - WBC Count: 6.32 (04-01-23 @ 09:02)     -      ROS  General: Denies rigors, nightsweats  HEENT: Denies headache, rhinorrhea, sore throat, eye pain  CV: Denies CP, palpitations  PULM: Denies wheezing, hemoptysis  GI: Denies hematemesis, hematochezia, melena  : Denies discharge, hematuria  MSK: Denies arthralgias, myalgias  SKIN: Denies rash, lesions  NEURO: Denies paresthesias, weakness  PSYCH: Denies depression, anxiety    VITALS:  T(F): 97.5, Max: 97.7 (04-03-23 @ 05:33)  HR: 76  BP: 124/56  RR: 18Vital Signs Last 24 Hrs  T(C): 36.4 (03 Apr 2023 12:14), Max: 36.5 (03 Apr 2023 05:33)  T(F): 97.5 (03 Apr 2023 12:14), Max: 97.7 (03 Apr 2023 05:33)  HR: 76 (03 Apr 2023 12:14) (70 - 78)  BP: 124/56 (03 Apr 2023 12:14) (99/52 - 127/63)  BP(mean): --  RR: 18 (03 Apr 2023 12:14) (18 - 18)  SpO2: 100% (03 Apr 2023 05:33) (100% - 100%)    Parameters below as of 03 Apr 2023 12:14  Patient On (Oxygen Delivery Method): room air        PHYSICAL EXAM:  Gen: NAD, resting in bed  HEENT: Normocephalic, atraumatic  Neck: supple, no lymphadenopathy  CV: Regular rate & regular rhythm  Lungs: decreased BS at bases, no fremitus  Abdomen: Soft, BS present  Ext: Warm, well perfused  Neuro: non focal, awake  Skin: LLE with golf size ulcer with drainage and surrounding erythema  -- more inferior necrotic ulcer tender/dry with erythem  Lines: no phlebitis    FH: Non-contributory  Social Hx: Non-contributory    TESTS & MEASUREMENTS:                  Culture - Other (collected 04-01-23 @ 11:33)  Source: Wound Wound  Preliminary Report (04-02-23 @ 20:26):    Numerous Pseudomonas aeruginosa    Moderate Streptococcus agalactiae (Group B) isolated    Group B streptococci are susceptible to ampicillin,    penicillin and cefazolin, but may be resistant to    erythromycin and clindamycin.    Recommendations for intrapartum prophylaxis for Group B    streptococci are penicillin or ampicillin.    Culture - Urine (collected 03-31-23 @ 14:38)  Source: Clean Catch Clean Catch (Midstream)  Final Report (04-01-23 @ 18:09):    <10,000 CFU/mL Normal Urogenital Glenna    Culture - Blood (collected 03-31-23 @ 14:24)  Source: .Blood Blood-Peripheral  Preliminary Report (04-01-23 @ 22:01):    No growth to date.    Culture - Blood (collected 03-31-23 @ 14:24)  Source: .Blood Blood-Peripheral  Preliminary Report (04-01-23 @ 22:01):    No growth to date.        Blood Gas Venous - Lactate: 1.10 mmol/L (03-31-23 @ 12:50)      INFECTIOUS DISEASES TESTING  MRSA PCR Result.: Negative (04-02-23 @ 10:17)  COVID-19 PCR: NotDetec (03-31-23 @ 14:19)  COVID-19 PCR: Detected (10-31-22 @ 13:38)  HIV-1/2 Combo Result: Nonreact (10-26-22 @ 05:13)  COVID-19 PCR: NotDetec (10-24-22 @ 21:02)  HIV-1/2 Combo Result: Nonreact (10-18-22 @ 09:01)  COVID-19 PCR: NotDetec (10-13-22 @ 23:15)      INFLAMMATORY MARKERS  C-Reactive Protein, Serum: 10.8 mg/L (04-01-23 @ 12:45)  C-Reactive Protein, Serum: 10.9 mg/L (04-01-23 @ 09:02)  Sedimentation Rate, Erythrocyte: 44 mm/Hr (04-01-23 @ 09:02)  Sedimentation Rate, Erythrocyte: 60 mm/Hr (10-26-22 @ 05:13)      RADIOLOGY & ADDITIONAL TESTS:  I have personally reviewed the last available Chest xray  CXR      CT      CARDIOLOGY TESTING  12 Lead ECG:   Ventricular Rate 84 BPM    Atrial Rate 84 BPM    P-R Interval 168 ms    QRS Duration 78 ms    Q-T Interval 394 ms    QTC Calculation(Bazett) 465 ms    P Axis 32 degrees    R Axis 39 degrees    T Axis 33 degrees    Diagnosis Line Normal sinus rhythm  Normal ECG    Confirmed by Gabriel Riley ([id number]) on 3/31/2023 7:39:59 PM (03-31-23 @ 13:31)      MEDICATIONS  acetaminophen     Tablet .. 975 Oral every 8 hours  ALPRAZolam 2 Oral three times a day  budesonide 160 MICROgram(s)/formoterol 4.5 MICROgram(s) Inhaler 2 Inhalation two times a day  cefepime   IVPB 2000 IV Intermittent every 8 hours  cefepime   IVPB     enoxaparin Injectable 40 SubCutaneous every 24 hours  gabapentin 100 Oral every 8 hours  methadone    Tablet 190 Oral daily  methocarbamol 750 Oral every 8 hours  pantoprazole    Tablet 40 Oral before breakfast      WEIGHT  Weight (kg): 109.4 (03-31-23 @ 19:42)      ANTIBIOTICS:  cefepime   IVPB 2000 milliGRAM(s) IV Intermittent every 8 hours  cefepime   IVPB          All available historical records have been reviewed

## 2023-04-05 NOTE — PROGRESS NOTE ADULT - REASON FOR ADMISSION
Infected Left Lower Leg Ulcers

## 2023-04-05 NOTE — DISCHARGE NOTE NURSING/CASE MANAGEMENT/SOCIAL WORK - NSDCPEFALRISK_GEN_ALL_CORE
For information on Fall & Injury Prevention, visit: [email or web address] OR  [email or web address] OR  https://www.cdc.gov/steadi/patient.html

## 2023-04-05 NOTE — PROGRESS NOTE ADULT - PROVIDER SPECIALTY LIST ADULT
Internal Medicine
Hospitalist
Internal Medicine
Infectious Disease
Internal Medicine
Infectious Disease

## 2023-04-05 NOTE — PROGRESS NOTE ADULT - ASSESSMENT
ASSESSMENT  42F w/ h/o opiate use disorder (on methadone), IVDU (reports last use 15yrs ago), anxiety, and chronic venous stasis p/w LLE pain, swelling, and ulcers    IMPRESSION  #Chronic LLE ulcers with cellulitis  - CT Lower Extremity w/ IV Cont, Bilateral (03.31.23 @ 16:31): Possible 3 x 0.6 x 2 cm cutaneous abscess near focal skin ulceration at  the posterior medial aspect ofthe left knee. Adjacent superficial thrombophlebitis is suspected. No evidence of necrotizing infection.  - wound Cx Group B Strep and Pseudomonas  #Venous Stasis Ulcers  #Opioid use on methadone  #Hx of IVDU     #Abx allergy: Bactrim (Short breath)      RECOMMENDATIONS  - agree with cipro 750 mg BID and PO augmentin 875/125 mg BID to complete 2 week course of antibiotics   - local wound care      Please call or message on [employer] if with any questions.  Spectra [phone]

## 2023-04-10 DIAGNOSIS — I83.222 VARICOSE VEINS OF LEFT LOWER EXTREMITY WITH BOTH ULCER OF CALF AND INFLAMMATION: ICD-10-CM

## 2023-04-10 DIAGNOSIS — L02.416 CUTANEOUS ABSCESS OF LEFT LOWER LIMB: ICD-10-CM

## 2023-04-10 DIAGNOSIS — Z91.198 PATIENT'S NONCOMPLIANCE WITH OTHER MEDICAL TREATMENT AND REGIMEN FOR OTHER REASON: ICD-10-CM

## 2023-04-10 DIAGNOSIS — E78.5 HYPERLIPIDEMIA, UNSPECIFIED: ICD-10-CM
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
## 2023-04-10 DIAGNOSIS — L03.116 CELLULITIS OF LEFT LOWER LIMB: ICD-10-CM

## 2023-04-10 DIAGNOSIS — I83.025 VARICOSE VEINS OF LEFT LOWER EXTREMITY WITH ULCER OTHER PART OF FOOT: ICD-10-CM

## 2023-04-10 DIAGNOSIS — L97.529 NON-PRESSURE CHRONIC ULCER OF OTHER PART OF LEFT FOOT WITH UNSPECIFIED SEVERITY: ICD-10-CM

## 2023-04-10 DIAGNOSIS — Z86.19 PERSONAL HISTORY OF OTHER INFECTIOUS AND PARASITIC DISEASES: ICD-10-CM

## 2023-04-10 DIAGNOSIS — B96.5 PSEUDOMONAS (AERUGINOSA) (MALLEI) (PSEUDOMALLEI) AS THE CAUSE OF DISEASES CLASSIFIED ELSEWHERE: ICD-10-CM

## 2023-04-10 DIAGNOSIS — T24.332S: ICD-10-CM

## 2023-04-10 DIAGNOSIS — B95.1 STREPTOCOCCUS, GROUP B, AS THE CAUSE OF DISEASES CLASSIFIED ELSEWHERE: ICD-10-CM

## 2023-04-10 DIAGNOSIS — I80.02 PHLEBITIS AND THROMBOPHLEBITIS OF SUPERFICIAL VESSELS OF LEFT LOWER EXTREMITY: ICD-10-CM

## 2023-04-10 DIAGNOSIS — F11.20 OPIOID DEPENDENCE, UNCOMPLICATED: ICD-10-CM
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
## 2023-04-10 DIAGNOSIS — K21.9 GASTRO-ESOPHAGEAL REFLUX DISEASE WITHOUT ESOPHAGITIS: ICD-10-CM
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
## 2023-04-10 DIAGNOSIS — L97.221 NON-PRESSURE CHRONIC ULCER OF LEFT CALF LIMITED TO BREAKDOWN OF SKIN: ICD-10-CM

## 2023-04-10 DIAGNOSIS — E03.9 HYPOTHYROIDISM, UNSPECIFIED: ICD-10-CM

## 2023-04-10 DIAGNOSIS — J45.20 MILD INTERMITTENT ASTHMA, UNCOMPLICATED: ICD-10-CM

## 2023-04-10 DIAGNOSIS — Z88.1 ALLERGY STATUS TO OTHER ANTIBIOTIC AGENTS STATUS: ICD-10-CM

## 2023-04-10 DIAGNOSIS — F17.200 NICOTINE DEPENDENCE, UNSPECIFIED, UNCOMPLICATED: ICD-10-CM

## 2023-04-10 DIAGNOSIS — F41.9 ANXIETY DISORDER, UNSPECIFIED: ICD-10-CM

## 2023-04-11 ENCOUNTER — APPOINTMENT (OUTPATIENT)
Dept: BURN CARE | Facility: CLINIC | Age: 43
End: 2023-04-11
Payer: MEDICAID

## 2023-04-11 ENCOUNTER — OUTPATIENT (OUTPATIENT)
Dept: OUTPATIENT SERVICES | Facility: HOSPITAL | Age: 43
LOS: 1 days | End: 2023-04-11
Payer: MEDICAID

## 2023-04-11 VITALS — TEMPERATURE: 98.6 F | HEART RATE: 104 BPM | DIASTOLIC BLOOD PRESSURE: 67 MMHG | SYSTOLIC BLOOD PRESSURE: 115 MMHG

## 2023-04-11 DIAGNOSIS — Z00.8 ENCOUNTER FOR OTHER GENERAL EXAMINATION: ICD-10-CM

## 2023-04-11 DIAGNOSIS — T30.0 BURN OF UNSPECIFIED BODY REGION, UNSPECIFIED DEGREE: ICD-10-CM

## 2023-04-11 PROCEDURE — 99213 OFFICE O/P EST LOW 20 MIN: CPT

## 2023-04-11 PROCEDURE — 87070 CULTURE OTHR SPECIMN AEROBIC: CPT

## 2023-04-11 RX ORDER — OXYCODONE AND ACETAMINOPHEN 5; 325 MG/1; MG/1
5-325 TABLET ORAL
Qty: 30 | Refills: 0 | Status: ACTIVE | COMMUNITY
Start: 2023-04-11 | End: 1900-01-01

## 2023-04-11 RX ORDER — CHLORHEXIDINE GLUCONATE 213 G/1000ML
4 SOLUTION TOPICAL
Qty: 1 | Refills: 2 | Status: ACTIVE | COMMUNITY
Start: 2023-04-11 | End: 1900-01-01

## 2023-04-11 RX ORDER — MUPIROCIN 20 MG/G
2 OINTMENT TOPICAL TWICE DAILY
Qty: 3 | Refills: 2 | Status: ACTIVE | COMMUNITY
Start: 2023-04-11 | End: 1900-01-01
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
## 2023-04-12 NOTE — REASON FOR VISIT
[Revisit] : revisit [Were you seen in the Emergency Room?] : seen in the emergency room [Were you admitted to the burn center at [facility]?] : not admitted to the burn center at [facility]
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
## 2023-04-13 DIAGNOSIS — B99.9 UNSPECIFIED INFECTIOUS DISEASE: ICD-10-CM
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
## 2023-04-13 DIAGNOSIS — T24.332D BURN OF THIRD DEGREE OF LEFT LOWER LEG, SUBSEQUENT ENCOUNTER: ICD-10-CM

## 2023-04-13 DIAGNOSIS — L97.829 NON-PRESSURE CHRONIC ULCER OF OTHER PART OF LEFT LOWER LEG WITH UNSPECIFIED SEVERITY: ICD-10-CM

## 2023-04-13 DIAGNOSIS — I83.028 VARICOSE VEINS OF LEFT LOWER EXTREMITY WITH ULCER OTHER PART OF LOWER LEG: ICD-10-CM

## 2023-04-13 DIAGNOSIS — T31.0 BURNS INVOLVING LESS THAN 10% OF BODY SURFACE: ICD-10-CM

## 2023-04-13 LAB
BACTERIA SPEC CULT: NORMAL
BACTERIA SPEC CULT: NORMAL

## 2023-04-25 ENCOUNTER — APPOINTMENT (OUTPATIENT)
Dept: BURN CARE | Facility: CLINIC | Age: 43
End: 2023-04-25

## 2023-05-11 ENCOUNTER — APPOINTMENT (OUTPATIENT)
Dept: BURN CARE | Facility: CLINIC | Age: 43
End: 2023-05-11
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
## 2023-07-22 NOTE — ASU PATIENT PROFILE, ADULT - FALL HARM RISK - FACTORS NURSING JUDGEMENT
Pt is here for lab with RN review. CBC reviewed with RUDY Mendiola and per Irene no new orders at this time as platelets stable at 67,000. Reviewed CBC with pt also. Pt was pleased with platelet level as has been off prednisone for a week. Pt has no complaints. Was seen in Urgent Care yesterday for cellulitis on bilateral lower legs, started on Doxycycline for 7 days. Copy of labs given to pt and f/u appt reviewed. Pt sees Dr. Coburn July 27th. Pt is instructed to call the office with any concerns or new symptoms prior to next visit. Pt vu and discharged ambulatory.    Lab Results   Component Value Date    WBC 8.07 07/21/2021    HGB 10.6 (L) 07/21/2021    HCT 32.1 (L) 07/21/2021    MCV 97.0 07/21/2021    PLT 67 (L) 07/21/2021        • Brought to ED from [facility] after being found by roommate on the floor and altered compared to baseline. • Approximate downtime 1.5 hours  • CTH: no acute intracranial abnormality  • CT c-spine: no c-spine fracture or traumatic malalignment  • VBG pH 7.2/pCO2 79.9  • Received 2mg IV Narvcan in ED without improvement in mental status  • Ammonia 38      Plan  • RESOLVED.  Was 2/2 hypercapnea  • Add melatonin  • OOB during the day to encourage proper sleep/wake cycle No

## 2024-04-11 RX ORDER — ALBUTEROL 90 UG/1
1 AEROSOL, METERED ORAL
Qty: 0 | Refills: 0 | DISCHARGE

## 2024-04-11 RX ORDER — BUDESONIDE AND FORMOTEROL FUMARATE DIHYDRATE 160; 4.5 UG/1; UG/1
2 AEROSOL RESPIRATORY (INHALATION)
Qty: 0 | Refills: 0 | DISCHARGE

## 2024-04-11 RX ORDER — ALPRAZOLAM 0.25 MG
1 TABLET ORAL
Qty: 0 | Refills: 0 | DISCHARGE

## 2024-04-11 RX ORDER — ALPRAZOLAM 0.25 MG
1 TABLET ORAL
Refills: 0 | DISCHARGE

## 2024-04-11 RX ORDER — BUDESONIDE AND FORMOTEROL FUMARATE DIHYDRATE 160; 4.5 UG/1; UG/1
2 AEROSOL RESPIRATORY (INHALATION)
Refills: 0 | DISCHARGE

## 2024-04-11 RX ORDER — ALBUTEROL 90 UG/1
2 AEROSOL, METERED ORAL
Refills: 0 | DISCHARGE

## 2024-04-11 RX ORDER — METHADONE HYDROCHLORIDE 40 MG/1
190 TABLET ORAL
Refills: 0 | DISCHARGE

## 2024-04-11 RX ORDER — METHADONE HYDROCHLORIDE 40 MG/1
190 TABLET ORAL
Qty: 0 | Refills: 0 | DISCHARGE

## 2024-04-11 RX ORDER — PANTOPRAZOLE SODIUM 20 MG/1
1 TABLET, DELAYED RELEASE ORAL
Qty: 0 | Refills: 0 | DISCHARGE

## 2025-08-05 ENCOUNTER — EMERGENCY (EMERGENCY)
Facility: HOSPITAL | Age: 45
LOS: 0 days | Discharge: LEFT BEFORE DISCHARGE | End: 2025-08-06
Attending: STUDENT IN AN ORGANIZED HEALTH CARE EDUCATION/TRAINING PROGRAM
Payer: SELF-PAY

## 2025-08-05 VITALS
OXYGEN SATURATION: 98 % | RESPIRATION RATE: 20 BRPM | HEIGHT: 64 IN | SYSTOLIC BLOOD PRESSURE: 168 MMHG | HEART RATE: 102 BPM | TEMPERATURE: 98 F | WEIGHT: 179.9 LBS | DIASTOLIC BLOOD PRESSURE: 143 MMHG

## 2025-08-05 VITALS
DIASTOLIC BLOOD PRESSURE: 105 MMHG | HEART RATE: 100 BPM | SYSTOLIC BLOOD PRESSURE: 168 MMHG | RESPIRATION RATE: 20 BRPM | OXYGEN SATURATION: 98 %

## 2025-08-05 DIAGNOSIS — L97.829 NON-PRESSURE CHRONIC ULCER OF OTHER PART OF LEFT LOWER LEG WITH UNSPECIFIED SEVERITY: ICD-10-CM

## 2025-08-05 DIAGNOSIS — R50.9 FEVER, UNSPECIFIED: ICD-10-CM

## 2025-08-05 DIAGNOSIS — F41.9 ANXIETY DISORDER, UNSPECIFIED: ICD-10-CM

## 2025-08-05 DIAGNOSIS — R10.814 LEFT LOWER QUADRANT ABDOMINAL TENDERNESS: ICD-10-CM

## 2025-08-05 DIAGNOSIS — Z53.29 PROCEDURE AND TREATMENT NOT CARRIED OUT BECAUSE OF PATIENT'S DECISION FOR OTHER REASONS: ICD-10-CM

## 2025-08-05 DIAGNOSIS — I83.028 VARICOSE VEINS OF LEFT LOWER EXTREMITY WITH ULCER OTHER PART OF LOWER LEG: ICD-10-CM

## 2025-08-05 DIAGNOSIS — F17.200 NICOTINE DEPENDENCE, UNSPECIFIED, UNCOMPLICATED: ICD-10-CM

## 2025-08-05 DIAGNOSIS — Z88.2 ALLERGY STATUS TO SULFONAMIDES: ICD-10-CM

## 2025-08-05 LAB
ALBUMIN SERPL ELPH-MCNC: 4.4 G/DL — SIGNIFICANT CHANGE UP (ref 3.5–5.2)
ALP SERPL-CCNC: 133 U/L — HIGH (ref 30–115)
ALT FLD-CCNC: 15 U/L — SIGNIFICANT CHANGE UP (ref 0–41)
ANION GAP SERPL CALC-SCNC: 15 MMOL/L — HIGH (ref 7–14)
APTT BLD: 34 SEC — SIGNIFICANT CHANGE UP (ref 27–39.2)
AST SERPL-CCNC: 21 U/L — SIGNIFICANT CHANGE UP (ref 0–41)
BASOPHILS # BLD AUTO: 0.05 K/UL — SIGNIFICANT CHANGE UP (ref 0–0.2)
BASOPHILS NFR BLD AUTO: 0.6 % — SIGNIFICANT CHANGE UP (ref 0–1)
BILIRUB SERPL-MCNC: 0.4 MG/DL — SIGNIFICANT CHANGE UP (ref 0.2–1.2)
BUN SERPL-MCNC: 18 MG/DL — SIGNIFICANT CHANGE UP (ref 10–20)
CALCIUM SERPL-MCNC: 9.7 MG/DL — SIGNIFICANT CHANGE UP (ref 8.4–10.5)
CHLORIDE SERPL-SCNC: 97 MMOL/L — LOW (ref 98–110)
CO2 SERPL-SCNC: 25 MMOL/L — SIGNIFICANT CHANGE UP (ref 17–32)
CREAT SERPL-MCNC: 1 MG/DL — SIGNIFICANT CHANGE UP (ref 0.7–1.5)
EGFR: 72 ML/MIN/1.73M2 — SIGNIFICANT CHANGE UP
EGFR: 72 ML/MIN/1.73M2 — SIGNIFICANT CHANGE UP
EOSINOPHIL # BLD AUTO: 0.08 K/UL — SIGNIFICANT CHANGE UP (ref 0–0.7)
EOSINOPHIL NFR BLD AUTO: 0.9 % — SIGNIFICANT CHANGE UP (ref 0–8)
GLUCOSE SERPL-MCNC: 88 MG/DL — SIGNIFICANT CHANGE UP (ref 70–99)
HCG SERPL QL: NEGATIVE — SIGNIFICANT CHANGE UP
HCT VFR BLD CALC: 44.9 % — SIGNIFICANT CHANGE UP (ref 37–47)
HGB BLD-MCNC: 14.4 G/DL — SIGNIFICANT CHANGE UP (ref 12–16)
IMM GRANULOCYTES NFR BLD AUTO: 0.3 % — SIGNIFICANT CHANGE UP (ref 0.1–0.3)
INR BLD: 1.02 RATIO — SIGNIFICANT CHANGE UP (ref 0.65–1.3)
LACTATE SERPL-SCNC: 1.1 MMOL/L — SIGNIFICANT CHANGE UP (ref 0.7–2)
LACTATE SERPL-SCNC: 2.1 MMOL/L — HIGH (ref 0.7–2)
LYMPHOCYTES # BLD AUTO: 2.51 K/UL — SIGNIFICANT CHANGE UP (ref 1.2–3.4)
LYMPHOCYTES # BLD AUTO: 27.8 % — SIGNIFICANT CHANGE UP (ref 20.5–51.1)
MCHC RBC-ENTMCNC: 25.9 PG — LOW (ref 27–31)
MCHC RBC-ENTMCNC: 32.1 G/DL — SIGNIFICANT CHANGE UP (ref 32–37)
MCV RBC AUTO: 80.9 FL — LOW (ref 81–99)
MONOCYTES # BLD AUTO: 0.58 K/UL — SIGNIFICANT CHANGE UP (ref 0.1–0.6)
MONOCYTES NFR BLD AUTO: 6.4 % — SIGNIFICANT CHANGE UP (ref 1.7–9.3)
NEUTROPHILS # BLD AUTO: 5.77 K/UL — SIGNIFICANT CHANGE UP (ref 1.4–6.5)
NEUTROPHILS NFR BLD AUTO: 64 % — SIGNIFICANT CHANGE UP (ref 42.2–75.2)
NRBC BLD AUTO-RTO: 0 /100 WBCS — SIGNIFICANT CHANGE UP (ref 0–0)
PLATELET # BLD AUTO: 535 K/UL — HIGH (ref 130–400)
PMV BLD: 8.8 FL — SIGNIFICANT CHANGE UP (ref 7.4–10.4)
POTASSIUM SERPL-MCNC: 4.8 MMOL/L — SIGNIFICANT CHANGE UP (ref 3.5–5)
POTASSIUM SERPL-SCNC: 4.8 MMOL/L — SIGNIFICANT CHANGE UP (ref 3.5–5)
PROT SERPL-MCNC: 9.2 G/DL — HIGH (ref 6–8)
PROTHROM AB SERPL-ACNC: 12.1 SEC — SIGNIFICANT CHANGE UP (ref 9.95–12.87)
RBC # BLD: 5.55 M/UL — HIGH (ref 4.2–5.4)
RBC # FLD: 14 % — SIGNIFICANT CHANGE UP (ref 11.5–14.5)
SODIUM SERPL-SCNC: 137 MMOL/L — SIGNIFICANT CHANGE UP (ref 135–146)
WBC # BLD: 9.02 K/UL — SIGNIFICANT CHANGE UP (ref 4.8–10.8)
WBC # FLD AUTO: 9.02 K/UL — SIGNIFICANT CHANGE UP (ref 4.8–10.8)

## 2025-08-05 PROCEDURE — 36415 COLL VENOUS BLD VENIPUNCTURE: CPT

## 2025-08-05 PROCEDURE — 96375 TX/PRO/DX INJ NEW DRUG ADDON: CPT

## 2025-08-05 PROCEDURE — 99284 EMERGENCY DEPT VISIT MOD MDM: CPT | Mod: 25

## 2025-08-05 PROCEDURE — 84703 CHORIONIC GONADOTROPIN ASSAY: CPT

## 2025-08-05 PROCEDURE — 83605 ASSAY OF LACTIC ACID: CPT

## 2025-08-05 PROCEDURE — 80053 COMPREHEN METABOLIC PANEL: CPT

## 2025-08-05 PROCEDURE — 85610 PROTHROMBIN TIME: CPT

## 2025-08-05 PROCEDURE — 73590 X-RAY EXAM OF LOWER LEG: CPT | Mod: 26,LT

## 2025-08-05 PROCEDURE — 71045 X-RAY EXAM CHEST 1 VIEW: CPT | Mod: 26

## 2025-08-05 PROCEDURE — 96361 HYDRATE IV INFUSION ADD-ON: CPT

## 2025-08-05 PROCEDURE — 73590 X-RAY EXAM OF LOWER LEG: CPT | Mod: LT

## 2025-08-05 PROCEDURE — 71045 X-RAY EXAM CHEST 1 VIEW: CPT

## 2025-08-05 PROCEDURE — 85025 COMPLETE CBC W/AUTO DIFF WBC: CPT

## 2025-08-05 PROCEDURE — 85730 THROMBOPLASTIN TIME PARTIAL: CPT

## 2025-08-05 PROCEDURE — 99285 EMERGENCY DEPT VISIT HI MDM: CPT

## 2025-08-05 PROCEDURE — 87040 BLOOD CULTURE FOR BACTERIA: CPT

## 2025-08-05 PROCEDURE — 96374 THER/PROPH/DIAG INJ IV PUSH: CPT

## 2025-08-05 RX ORDER — DOXYCYCLINE HYCLATE 100 MG
1 TABLET ORAL
Qty: 14 | Refills: 0
Start: 2025-08-05 | End: 2025-08-11

## 2025-08-05 RX ORDER — DOXYCYCLINE HYCLATE 100 MG
100 TABLET ORAL ONCE
Refills: 0 | Status: COMPLETED | OUTPATIENT
Start: 2025-08-05 | End: 2025-08-05

## 2025-08-05 RX ORDER — KETOROLAC TROMETHAMINE 30 MG/ML
15 INJECTION, SOLUTION INTRAMUSCULAR; INTRAVENOUS ONCE
Refills: 0 | Status: DISCONTINUED | OUTPATIENT
Start: 2025-08-05 | End: 2025-08-05

## 2025-08-05 RX ORDER — AMOXICILLIN AND CLAVULANATE POTASSIUM 500; 125 MG/1; MG/1
1 TABLET, FILM COATED ORAL
Qty: 14 | Refills: 0
Start: 2025-08-05 | End: 2025-08-11

## 2025-08-05 RX ORDER — SODIUM CHLORIDE 9 G/1000ML
1000 INJECTION, SOLUTION INTRAVENOUS ONCE
Refills: 0 | Status: COMPLETED | OUTPATIENT
Start: 2025-08-05 | End: 2025-08-05

## 2025-08-05 RX ORDER — CIPROFLOXACIN HCL 250 MG
1 TABLET ORAL
Qty: 14 | Refills: 0
Start: 2025-08-05 | End: 2025-08-11

## 2025-08-05 RX ADMIN — Medication 100 MILLIGRAM(S): at 22:25

## 2025-08-05 RX ADMIN — KETOROLAC TROMETHAMINE 15 MILLIGRAM(S): 30 INJECTION, SOLUTION INTRAMUSCULAR; INTRAVENOUS at 18:57

## 2025-08-05 RX ADMIN — SODIUM CHLORIDE 1000 MILLILITER(S): 9 INJECTION, SOLUTION INTRAVENOUS at 19:59

## 2025-08-05 RX ADMIN — SODIUM CHLORIDE 1000 MILLILITER(S): 9 INJECTION, SOLUTION INTRAVENOUS at 18:57

## 2025-08-06 PROBLEM — F11.20 OPIOID DEPENDENCE, UNCOMPLICATED: Chronic | Status: ACTIVE | Noted: 2023-03-31

## 2025-08-06 PROBLEM — Z87.39 PERSONAL HISTORY OF OTHER DISEASES OF THE MUSCULOSKELETAL SYSTEM AND CONNECTIVE TISSUE: Chronic | Status: ACTIVE | Noted: 2022-10-25

## 2025-08-06 PROBLEM — F19.91: Chronic | Status: ACTIVE | Noted: 2023-03-31
